# Patient Record
Sex: MALE | Race: BLACK OR AFRICAN AMERICAN | Employment: OTHER | URBAN - METROPOLITAN AREA
[De-identification: names, ages, dates, MRNs, and addresses within clinical notes are randomized per-mention and may not be internally consistent; named-entity substitution may affect disease eponyms.]

---

## 2017-02-01 RX ORDER — AMLODIPINE BESYLATE 10 MG/1
TABLET ORAL
Qty: 90 TABLET | Refills: 0 | Status: SHIPPED | OUTPATIENT
Start: 2017-02-01 | End: 2017-04-28 | Stop reason: SDUPTHER

## 2017-03-14 RX ORDER — PRAVASTATIN SODIUM 40 MG
TABLET ORAL
Qty: 90 TABLET | Refills: 0 | Status: SHIPPED | OUTPATIENT
Start: 2017-03-14 | End: 2017-06-08 | Stop reason: SDUPTHER

## 2017-03-21 RX ORDER — VALSARTAN AND HYDROCHLOROTHIAZIDE 160; 12.5 MG/1; MG/1
TABLET, FILM COATED ORAL
Qty: 30 TABLET | Refills: 5 | Status: SHIPPED | OUTPATIENT
Start: 2017-03-21 | End: 2017-09-18 | Stop reason: SDUPTHER

## 2017-05-04 RX ORDER — AMLODIPINE BESYLATE 10 MG/1
TABLET ORAL
Qty: 90 TABLET | Refills: 0 | Status: SHIPPED | OUTPATIENT
Start: 2017-05-04 | End: 2017-07-19 | Stop reason: SDUPTHER

## 2017-06-09 RX ORDER — PRAVASTATIN SODIUM 40 MG
TABLET ORAL
Qty: 90 TABLET | Refills: 0 | Status: SHIPPED | OUTPATIENT
Start: 2017-06-09 | End: 2017-09-08 | Stop reason: SDUPTHER

## 2017-06-21 ENCOUNTER — HOSPITAL ENCOUNTER (OUTPATIENT)
Age: 57
Setting detail: SPECIMEN
Discharge: HOME | End: 2017-06-21
Payer: COMMERCIAL

## 2017-06-21 ENCOUNTER — OFFICE VISIT (OUTPATIENT)
Dept: INTERNAL MEDICINE CLINIC | Age: 57
End: 2017-06-21
Payer: COMMERCIAL

## 2017-06-21 VITALS
WEIGHT: 262.3 LBS | RESPIRATION RATE: 16 BRPM | HEIGHT: 71 IN | OXYGEN SATURATION: 96 % | HEART RATE: 77 BPM | SYSTOLIC BLOOD PRESSURE: 126 MMHG | DIASTOLIC BLOOD PRESSURE: 66 MMHG | TEMPERATURE: 98.6 F | BODY MASS INDEX: 36.72 KG/M2

## 2017-06-21 DIAGNOSIS — Z11.59 NEED FOR HEPATITIS C SCREENING TEST: ICD-10-CM

## 2017-06-21 DIAGNOSIS — N40.0 BENIGN NON-NODULAR PROSTATIC HYPERPLASIA WITHOUT LOWER URINARY TRACT SYMPTOMS: ICD-10-CM

## 2017-06-21 DIAGNOSIS — N52.01 ERECTILE DYSFUNCTION DUE TO ARTERIAL INSUFFICIENCY: ICD-10-CM

## 2017-06-21 DIAGNOSIS — I10 ESSENTIAL HYPERTENSION: ICD-10-CM

## 2017-06-21 DIAGNOSIS — Z12.5 PROSTATE CANCER SCREENING: ICD-10-CM

## 2017-06-21 DIAGNOSIS — Z12.11 COLON CANCER SCREENING: ICD-10-CM

## 2017-06-21 DIAGNOSIS — I10 ESSENTIAL HYPERTENSION: Primary | ICD-10-CM

## 2017-06-21 DIAGNOSIS — Z13.1 DIABETES MELLITUS SCREENING: ICD-10-CM

## 2017-06-21 DIAGNOSIS — E78.00 PURE HYPERCHOLESTEROLEMIA: ICD-10-CM

## 2017-06-21 DIAGNOSIS — Z11.4 SCREENING FOR HIV (HUMAN IMMUNODEFICIENCY VIRUS): ICD-10-CM

## 2017-06-21 DIAGNOSIS — K21.9 GASTROESOPHAGEAL REFLUX DISEASE WITHOUT ESOPHAGITIS: ICD-10-CM

## 2017-06-21 LAB
ALBUMIN SERPL-MCNC: 4.5 G/DL (ref 3.5–5.2)
ALBUMIN/GLOBULIN RATIO: 1.3 (ref 1–2.5)
ALP BLD-CCNC: 76 U/L (ref 40–129)
ALT SERPL-CCNC: 13 U/L (ref 5–41)
ANION GAP SERPL CALCULATED.3IONS-SCNC: 13 MMOL/L (ref 9–17)
AST SERPL-CCNC: 19 U/L
BILIRUB SERPL-MCNC: 0.88 MG/DL (ref 0.3–1.2)
BUN BLDV-MCNC: 10 MG/DL (ref 6–20)
BUN/CREAT BLD: ABNORMAL (ref 9–20)
CALCIUM SERPL-MCNC: 9.6 MG/DL (ref 8.6–10.4)
CHLORIDE BLD-SCNC: 102 MMOL/L (ref 98–107)
CHOLESTEROL/HDL RATIO: 2.4
CHOLESTEROL: 135 MG/DL
CO2: 27 MMOL/L (ref 20–31)
CREAT SERPL-MCNC: 0.93 MG/DL (ref 0.7–1.2)
GFR AFRICAN AMERICAN: >60 ML/MIN
GFR NON-AFRICAN AMERICAN: >60 ML/MIN
GFR SERPL CREATININE-BSD FRML MDRD: ABNORMAL ML/MIN/{1.73_M2}
GFR SERPL CREATININE-BSD FRML MDRD: ABNORMAL ML/MIN/{1.73_M2}
GLUCOSE BLD-MCNC: 101 MG/DL (ref 70–99)
HDLC SERPL-MCNC: 57 MG/DL
HEPATITIS C ANTIBODY: NONREACTIVE
HIV AG/AB: NONREACTIVE
LDL CHOLESTEROL: 66 MG/DL (ref 0–130)
POTASSIUM SERPL-SCNC: 4.3 MMOL/L (ref 3.7–5.3)
PROSTATE SPECIFIC ANTIGEN: 1.23 UG/L
SODIUM BLD-SCNC: 142 MMOL/L (ref 135–144)
TOTAL PROTEIN: 8 G/DL (ref 6.4–8.3)
TRIGL SERPL-MCNC: 60 MG/DL
VLDLC SERPL CALC-MCNC: NORMAL MG/DL (ref 1–30)

## 2017-06-21 PROCEDURE — G8419 CALC BMI OUT NRM PARAM NOF/U: HCPCS | Performed by: FAMILY MEDICINE

## 2017-06-21 PROCEDURE — 99213 OFFICE O/P EST LOW 20 MIN: CPT | Performed by: FAMILY MEDICINE

## 2017-06-21 PROCEDURE — 3017F COLORECTAL CA SCREEN DOC REV: CPT | Performed by: FAMILY MEDICINE

## 2017-06-21 PROCEDURE — G8427 DOCREV CUR MEDS BY ELIG CLIN: HCPCS | Performed by: FAMILY MEDICINE

## 2017-06-21 PROCEDURE — 1036F TOBACCO NON-USER: CPT | Performed by: FAMILY MEDICINE

## 2017-06-21 ASSESSMENT — ENCOUNTER SYMPTOMS
SORE THROAT: 0
EYE REDNESS: 0
EYE DISCHARGE: 0
ABDOMINAL DISTENTION: 0
WHEEZING: 0
CONSTIPATION: 0
COLOR CHANGE: 0
EYE PAIN: 0
ABDOMINAL PAIN: 0
STRIDOR: 0
SHORTNESS OF BREATH: 0
TROUBLE SWALLOWING: 0
COUGH: 0
ANAL BLEEDING: 0
BACK PAIN: 0
FACIAL SWELLING: 0
CHEST TIGHTNESS: 0

## 2017-06-21 ASSESSMENT — PATIENT HEALTH QUESTIONNAIRE - PHQ9
1. LITTLE INTEREST OR PLEASURE IN DOING THINGS: 0
2. FEELING DOWN, DEPRESSED OR HOPELESS: 0
SUM OF ALL RESPONSES TO PHQ9 QUESTIONS 1 & 2: 0
SUM OF ALL RESPONSES TO PHQ QUESTIONS 1-9: 0

## 2017-06-22 LAB
ESTIMATED AVERAGE GLUCOSE: 114 MG/DL
HBA1C MFR BLD: 5.6 % (ref 4–6)

## 2017-08-08 RX ORDER — AMLODIPINE BESYLATE 10 MG/1
TABLET ORAL
Qty: 90 TABLET | Refills: 0 | Status: SHIPPED | OUTPATIENT
Start: 2017-08-08 | End: 2017-11-13 | Stop reason: SDUPTHER

## 2017-08-25 ENCOUNTER — HOSPITAL ENCOUNTER (OUTPATIENT)
Dept: MRI IMAGING | Facility: CLINIC | Age: 57
Discharge: HOME | End: 2017-08-25
Payer: COMMERCIAL

## 2017-08-25 DIAGNOSIS — D21.21 BENIGN NEOPLASM OF CONNECTIVE AND OTHER SOFT TISSUE OF RIGHT LOWER LIMB, INCLUDING HIP: ICD-10-CM

## 2017-08-25 LAB — POC CREATININE: 1.1 MG/DL (ref 0.6–1.4)

## 2017-08-25 PROCEDURE — 6360000004 HC RX CONTRAST MEDICATION: Performed by: ORTHOPAEDIC SURGERY

## 2017-08-25 PROCEDURE — 82565 ASSAY OF CREATININE: CPT

## 2017-08-25 PROCEDURE — 73720 MRI LWR EXTREMITY W/O&W/DYE: CPT

## 2017-08-25 PROCEDURE — A9579 GAD-BASE MR CONTRAST NOS,1ML: HCPCS | Performed by: ORTHOPAEDIC SURGERY

## 2017-08-25 RX ORDER — SODIUM CHLORIDE 0.9 % (FLUSH) 0.9 %
10 SYRINGE (ML) INJECTION PRN
Status: DISCONTINUED | OUTPATIENT
Start: 2017-08-25 | End: 2017-08-28 | Stop reason: HOSPADM

## 2017-08-25 RX ADMIN — GADOPENTETATE DIMEGLUMINE 20 ML: 469.01 INJECTION INTRAVENOUS at 14:24

## 2017-09-09 RX ORDER — PRAVASTATIN SODIUM 40 MG
TABLET ORAL
Qty: 90 TABLET | Refills: 1 | Status: SHIPPED | OUTPATIENT
Start: 2017-09-09 | End: 2018-03-13 | Stop reason: SDUPTHER

## 2017-09-20 RX ORDER — VALSARTAN AND HYDROCHLOROTHIAZIDE 160; 12.5 MG/1; MG/1
TABLET, FILM COATED ORAL
Qty: 30 TABLET | Refills: 3 | Status: SHIPPED | OUTPATIENT
Start: 2017-09-20 | End: 2017-12-15 | Stop reason: SDUPTHER

## 2017-11-13 ENCOUNTER — TELEPHONE (OUTPATIENT)
Dept: INTERNAL MEDICINE CLINIC | Age: 57
End: 2017-11-13

## 2017-11-13 DIAGNOSIS — I10 ESSENTIAL HYPERTENSION: Primary | ICD-10-CM

## 2017-11-13 RX ORDER — AMLODIPINE BESYLATE 10 MG/1
TABLET ORAL
Qty: 30 TABLET | Refills: 0 | Status: SHIPPED | OUTPATIENT
Start: 2017-11-13 | End: 2017-12-15 | Stop reason: SDUPTHER

## 2017-12-15 DIAGNOSIS — I10 ESSENTIAL HYPERTENSION: ICD-10-CM

## 2017-12-15 RX ORDER — VALSARTAN AND HYDROCHLOROTHIAZIDE 160; 12.5 MG/1; MG/1
1 TABLET, FILM COATED ORAL DAILY
Qty: 25 TABLET | Refills: 0 | Status: SHIPPED | OUTPATIENT
Start: 2017-12-15 | End: 2018-02-12 | Stop reason: SDUPTHER

## 2017-12-15 RX ORDER — AMLODIPINE BESYLATE 10 MG/1
TABLET ORAL
Qty: 30 TABLET | Refills: 0 | Status: SHIPPED | OUTPATIENT
Start: 2017-12-15 | End: 2018-01-10 | Stop reason: SDUPTHER

## 2017-12-15 NOTE — TELEPHONE ENCOUNTER
Pharmacy calling to ask if patient can get a short supply of BP medication as they have already loaned him a few    because patient is out   Next appt 12/20

## 2017-12-15 NOTE — TELEPHONE ENCOUNTER
Health Maintenance   Topic Date Due    DTaP/Tdap/Td vaccine (1 - Tdap) 12/06/1977    Flu vaccine (1) 09/01/2017    Diabetes screen  06/21/2020    Lipid screen  06/21/2022    Colon cancer screen colonoscopy  07/24/2025    Hepatitis C screen  Completed    HIV screen  Completed             (applicable per patient's age: Cancer Screenings, Depression Screening, Fall Risk Screening, Immunizations)    Hemoglobin A1C (%)   Date Value   06/21/2017 5.6   12/21/2016 5.3   12/18/2015 5.5     LDL Cholesterol (mg/dL)   Date Value   06/21/2017 66     AST (U/L)   Date Value   06/21/2017 19     ALT (U/L)   Date Value   06/21/2017 13     BUN (mg/dL)   Date Value   06/21/2017 10      (goal A1C is < 7)   (goal LDL is <100) need 30-50% reduction from baseline     BP Readings from Last 3 Encounters:   06/21/17 126/66   12/21/16 124/68   08/17/16 140/78    (goal /80)      All Future Testing planned in [facility]:      Next Visit Date:  Future Appointments  Date Time Provider Mark Kim   12/21/2017 2:00 PM Rolando Oliveira MD Adult pc [email or web address]            Patient Active Problem List:     BPH (benign prostatic hyperplasia)     Erectile dysfunction     GERD (gastroesophageal reflux disease)     Hypertension     Hyperlipidemia     Sinusitis     New onset of headaches after age 46     Elevated CK     Sleep apnea     Tendinopathy of rotator cuff     Prostate cancer screening     Screening, anemia, deficiency, iron     Mass of thigh

## 2017-12-21 ENCOUNTER — OFFICE VISIT (OUTPATIENT)
Dept: INTERNAL MEDICINE CLINIC | Age: 57
End: 2017-12-21
Payer: COMMERCIAL

## 2017-12-21 VITALS
RESPIRATION RATE: 18 BRPM | BODY MASS INDEX: 37.83 KG/M2 | DIASTOLIC BLOOD PRESSURE: 80 MMHG | HEIGHT: 71 IN | WEIGHT: 270.2 LBS | SYSTOLIC BLOOD PRESSURE: 130 MMHG

## 2017-12-21 DIAGNOSIS — K21.9 GASTROESOPHAGEAL REFLUX DISEASE WITHOUT ESOPHAGITIS: ICD-10-CM

## 2017-12-21 DIAGNOSIS — I10 ESSENTIAL HYPERTENSION: Primary | ICD-10-CM

## 2017-12-21 DIAGNOSIS — G47.33 OSA ON CPAP: ICD-10-CM

## 2017-12-21 DIAGNOSIS — Z99.89 OSA ON CPAP: ICD-10-CM

## 2017-12-21 DIAGNOSIS — E78.49 OTHER HYPERLIPIDEMIA: ICD-10-CM

## 2017-12-21 DIAGNOSIS — M65.841 STENOSING TENOSYNOVITIS OF FINGER OF RIGHT HAND: ICD-10-CM

## 2017-12-21 DIAGNOSIS — N52.8 OTHER MALE ERECTILE DYSFUNCTION: ICD-10-CM

## 2017-12-21 DIAGNOSIS — N40.0 BENIGN PROSTATIC HYPERPLASIA WITHOUT LOWER URINARY TRACT SYMPTOMS: ICD-10-CM

## 2017-12-21 PROCEDURE — G8484 FLU IMMUNIZE NO ADMIN: HCPCS | Performed by: FAMILY MEDICINE

## 2017-12-21 PROCEDURE — 99214 OFFICE O/P EST MOD 30 MIN: CPT | Performed by: FAMILY MEDICINE

## 2017-12-21 PROCEDURE — 90688 IIV4 VACCINE SPLT 0.5 ML IM: CPT | Performed by: FAMILY MEDICINE

## 2017-12-21 PROCEDURE — G8417 CALC BMI ABV UP PARAM F/U: HCPCS | Performed by: FAMILY MEDICINE

## 2017-12-21 PROCEDURE — 3017F COLORECTAL CA SCREEN DOC REV: CPT | Performed by: FAMILY MEDICINE

## 2017-12-21 PROCEDURE — G8427 DOCREV CUR MEDS BY ELIG CLIN: HCPCS | Performed by: FAMILY MEDICINE

## 2017-12-21 PROCEDURE — 1036F TOBACCO NON-USER: CPT | Performed by: FAMILY MEDICINE

## 2017-12-21 PROCEDURE — 90471 IMMUNIZATION ADMIN: CPT | Performed by: FAMILY MEDICINE

## 2017-12-21 ASSESSMENT — ENCOUNTER SYMPTOMS
RESPIRATORY NEGATIVE: 1
ALLERGIC/IMMUNOLOGIC NEGATIVE: 1
GASTROINTESTINAL NEGATIVE: 1
EYES NEGATIVE: 1

## 2017-12-21 NOTE — PROGRESS NOTES
Chronic Disease Visit Information    BP Readings from Last 3 Encounters:   12/21/17 130/80   06/21/17 126/66   12/21/16 124/68          Hemoglobin A1C (%)   Date Value   06/21/2017 5.6   12/21/2016 5.3   12/18/2015 5.5     LDL Cholesterol (mg/dL)   Date Value   06/21/2017 66     HDL (mg/dL)   Date Value   06/21/2017 57     BUN (mg/dL)   Date Value   06/21/2017 10     CREATININE (mg/dL)   Date Value   06/21/2017 0.93     POC Creatinine (mg/dL)   Date Value   08/25/2017 1.1     Glucose (mg/dL)   Date Value   06/21/2017 101 (H)            Have you changed or started any medications since your last visit including any over-the-counter medicines, vitamins, or herbal medicines? no   Are you having any side effects from any of your medications? -  no  Have you stopped taking any of your medications? Is so, why? -  no    Have you seen any other physician or provider since your last visit? No  Have you had any other diagnostic tests since your last visit? No  Have you been seen in the emergency room and/or had an admission to a hospital since we last saw you? No  Have you had your annual diabetic retinal (eye) exam? Yes - Records Requested  Have you had your routine dental cleaning in the past 6 months? no    Have you activated your [employer] account? If not, what are your barriers?  No:      Patient Care Team:  Lisa White MD as PCP - General (Family Medicine)         Medical History Review  Past Medical, Family, and Social History reviewed and does contribute to the patient presenting condition    Health Maintenance   Topic Date Due    DTaP/Tdap/Td vaccine (1 - Tdap) 12/06/1977    Flu vaccine (1) 09/01/2017    Diabetes screen  06/21/2020    Lipid screen  06/21/2022    Colon cancer screen colonoscopy  07/24/2025    Hepatitis C screen  Completed    HIV screen  Completed
mood and affect. His behavior is normal. Thought content normal.       Assessment:      1. Essential hypertension     2. Other hyperlipidemia     3. Stenosing tenosynovitis of finger of right hand     4. Other male erectile dysfunction     5. Gastroesophageal reflux disease without esophagitis     6. Benign prostatic hyperplasia without lower urinary tract symptoms     7. YUMIKO on CPAP             Plan:      66-year-old morbidly obese male returns for follow-up. He does not voice any distress. He is afebrile hemodynamically stable, clinical examination is benign. He is normotensive. Advised to continue calcium channel blocker and ARB. Is advised low-fat high-fiber diet, daily moderate exercise, lifestyle change and weight loss to keep BMI 27 below. Obstructive sleep apnea. He is compliant with CPAP. Stenosing tenosynovitis right long finger. No apparent sign of trigger finger. Observe. Is also given an option of seeing an orthopedic surgeon. He wished to observe  Hyperlipidemia on statin that he is tolerating well. Erectile dysfunction on Cialis  Osteoarthritis. Baseline stable. Continue ibuprofen when necessary. Impaired fasting glucose however, A1c is 5.6. Risk factor stratification is advised. GERD stable on proton pump inhibitor. BPH clinically asymptomatic. He denies tobacco, excessive alcohol or illicit drug use  Med list reviewed advised to continue  Call for any concern  This note is created with a voice recognition program and while intend to generate a document that accurately reflects the content of the visit, no guarantee can be provided that every mistake has been identified and corrected by editing.

## 2018-01-10 DIAGNOSIS — I10 ESSENTIAL HYPERTENSION: ICD-10-CM

## 2018-01-11 RX ORDER — AMLODIPINE BESYLATE 10 MG/1
TABLET ORAL
Qty: 30 TABLET | Refills: 2 | Status: SHIPPED | OUTPATIENT
Start: 2018-01-11 | End: 2018-04-13 | Stop reason: SDUPTHER

## 2018-01-19 ENCOUNTER — HOSPITAL ENCOUNTER (OUTPATIENT)
Dept: MRI IMAGING | Facility: CLINIC | Age: 58
Discharge: HOME | End: 2018-01-19
Payer: COMMERCIAL

## 2018-01-19 DIAGNOSIS — D21.21: ICD-10-CM

## 2018-01-19 LAB — POC CREATININE: 1.1 MG/DL (ref 0.6–1.4)

## 2018-01-19 PROCEDURE — A9579 GAD-BASE MR CONTRAST NOS,1ML: HCPCS | Performed by: ORTHOPAEDIC SURGERY

## 2018-01-19 PROCEDURE — 6360000004 HC RX CONTRAST MEDICATION: Performed by: ORTHOPAEDIC SURGERY

## 2018-01-19 PROCEDURE — 82565 ASSAY OF CREATININE: CPT

## 2018-01-19 PROCEDURE — 73720 MRI LWR EXTREMITY W/O&W/DYE: CPT

## 2018-01-19 RX ADMIN — GADOTERIDOL 20 ML: 279.3 INJECTION, SOLUTION INTRAVENOUS at 14:56

## 2018-02-12 RX ORDER — VALSARTAN AND HYDROCHLOROTHIAZIDE 160; 12.5 MG/1; MG/1
TABLET, FILM COATED ORAL
Qty: 25 TABLET | Refills: 2 | Status: SHIPPED | OUTPATIENT
Start: 2018-02-12 | End: 2018-04-17 | Stop reason: SDUPTHER

## 2018-03-14 RX ORDER — PRAVASTATIN SODIUM 40 MG
TABLET ORAL
Qty: 90 TABLET | Refills: 1 | Status: SHIPPED | OUTPATIENT
Start: 2018-03-14 | End: 2018-09-10 | Stop reason: SDUPTHER

## 2018-04-13 DIAGNOSIS — I10 ESSENTIAL HYPERTENSION: ICD-10-CM

## 2018-04-13 RX ORDER — AMLODIPINE BESYLATE 10 MG/1
TABLET ORAL
Qty: 30 TABLET | Refills: 2 | Status: SHIPPED | OUTPATIENT
Start: 2018-04-13 | End: 2018-07-12 | Stop reason: SDUPTHER

## 2018-04-17 ENCOUNTER — OFFICE VISIT (OUTPATIENT)
Dept: INTERNAL MEDICINE CLINIC | Age: 58
End: 2018-04-17
Payer: COMMERCIAL

## 2018-04-17 VITALS
RESPIRATION RATE: 17 BRPM | HEIGHT: 71 IN | HEART RATE: 78 BPM | SYSTOLIC BLOOD PRESSURE: 122 MMHG | WEIGHT: 264 LBS | BODY MASS INDEX: 36.96 KG/M2 | DIASTOLIC BLOOD PRESSURE: 70 MMHG | OXYGEN SATURATION: 98 %

## 2018-04-17 DIAGNOSIS — G47.33 OSA ON CPAP: ICD-10-CM

## 2018-04-17 DIAGNOSIS — Z99.89 OSA ON CPAP: ICD-10-CM

## 2018-04-17 DIAGNOSIS — I10 ESSENTIAL HYPERTENSION: ICD-10-CM

## 2018-04-17 DIAGNOSIS — K21.9 GASTROESOPHAGEAL REFLUX DISEASE WITHOUT ESOPHAGITIS: ICD-10-CM

## 2018-04-17 DIAGNOSIS — N52.8 OTHER MALE ERECTILE DYSFUNCTION: ICD-10-CM

## 2018-04-17 DIAGNOSIS — M15.9 PRIMARY OSTEOARTHRITIS INVOLVING MULTIPLE JOINTS: ICD-10-CM

## 2018-04-17 DIAGNOSIS — N40.0 BENIGN PROSTATIC HYPERPLASIA WITHOUT LOWER URINARY TRACT SYMPTOMS: ICD-10-CM

## 2018-04-17 DIAGNOSIS — E78.49 OTHER HYPERLIPIDEMIA: Primary | ICD-10-CM

## 2018-04-17 PROCEDURE — G8417 CALC BMI ABV UP PARAM F/U: HCPCS | Performed by: FAMILY MEDICINE

## 2018-04-17 PROCEDURE — G8427 DOCREV CUR MEDS BY ELIG CLIN: HCPCS | Performed by: FAMILY MEDICINE

## 2018-04-17 PROCEDURE — 1036F TOBACCO NON-USER: CPT | Performed by: FAMILY MEDICINE

## 2018-04-17 PROCEDURE — 99214 OFFICE O/P EST MOD 30 MIN: CPT | Performed by: FAMILY MEDICINE

## 2018-04-17 PROCEDURE — 3017F COLORECTAL CA SCREEN DOC REV: CPT | Performed by: FAMILY MEDICINE

## 2018-04-17 RX ORDER — VALSARTAN AND HYDROCHLOROTHIAZIDE 160; 12.5 MG/1; MG/1
1 TABLET, FILM COATED ORAL DAILY
Qty: 30 TABLET | Refills: 2 | Status: SHIPPED | OUTPATIENT
Start: 2018-04-17 | End: 2018-08-14 | Stop reason: SDUPTHER

## 2018-04-17 RX ORDER — HYDROCHLOROTHIAZIDE 25 MG/1
25 TABLET ORAL DAILY
Qty: 90 TABLET | Refills: 1 | Status: SHIPPED | OUTPATIENT
Start: 2018-04-17 | End: 2018-04-17 | Stop reason: CLARIF

## 2018-04-17 ASSESSMENT — ENCOUNTER SYMPTOMS
GASTROINTESTINAL NEGATIVE: 1
RESPIRATORY NEGATIVE: 1
EYES NEGATIVE: 1
ALLERGIC/IMMUNOLOGIC NEGATIVE: 1

## 2018-04-23 ENCOUNTER — OFFICE VISIT (OUTPATIENT)
Dept: PULMONOLOGY | Age: 58
End: 2018-04-23
Payer: COMMERCIAL

## 2018-04-23 VITALS
WEIGHT: 267.4 LBS | DIASTOLIC BLOOD PRESSURE: 77 MMHG | BODY MASS INDEX: 37.44 KG/M2 | HEIGHT: 71 IN | TEMPERATURE: 97.8 F | RESPIRATION RATE: 16 BRPM | SYSTOLIC BLOOD PRESSURE: 138 MMHG | HEART RATE: 80 BPM | OXYGEN SATURATION: 100 %

## 2018-04-23 DIAGNOSIS — K21.9 GASTROESOPHAGEAL REFLUX DISEASE WITHOUT ESOPHAGITIS: ICD-10-CM

## 2018-04-23 DIAGNOSIS — N52.2 DRUG-INDUCED ERECTILE DYSFUNCTION: ICD-10-CM

## 2018-04-23 DIAGNOSIS — I10 ESSENTIAL HYPERTENSION: ICD-10-CM

## 2018-04-23 DIAGNOSIS — E66.9 OBESITY (BMI 35.0-39.9 WITHOUT COMORBIDITY): ICD-10-CM

## 2018-04-23 DIAGNOSIS — G47.33 OSA (OBSTRUCTIVE SLEEP APNEA): Primary | ICD-10-CM

## 2018-04-23 PROCEDURE — 3017F COLORECTAL CA SCREEN DOC REV: CPT | Performed by: INTERNAL MEDICINE

## 2018-04-23 PROCEDURE — G8417 CALC BMI ABV UP PARAM F/U: HCPCS | Performed by: INTERNAL MEDICINE

## 2018-04-23 PROCEDURE — G8427 DOCREV CUR MEDS BY ELIG CLIN: HCPCS | Performed by: INTERNAL MEDICINE

## 2018-04-23 PROCEDURE — 1036F TOBACCO NON-USER: CPT | Performed by: INTERNAL MEDICINE

## 2018-04-23 PROCEDURE — 99205 OFFICE O/P NEW HI 60 MIN: CPT | Performed by: INTERNAL MEDICINE

## 2018-04-23 ASSESSMENT — SLEEP AND FATIGUE QUESTIONNAIRES
HOW LIKELY ARE YOU TO NOD OFF OR FALL ASLEEP WHILE SITTING QUIETLY AFTER LUNCH WITHOUT ALCOHOL: 0
HOW LIKELY ARE YOU TO NOD OFF OR FALL ASLEEP WHILE WATCHING TV: 0
ESS TOTAL SCORE: 0
HOW LIKELY ARE YOU TO NOD OFF OR FALL ASLEEP WHILE SITTING AND READING: 0
HOW LIKELY ARE YOU TO NOD OFF OR FALL ASLEEP WHILE SITTING INACTIVE IN A PUBLIC PLACE: 0
HOW LIKELY ARE YOU TO NOD OFF OR FALL ASLEEP WHILE SITTING AND TALKING TO SOMEONE: 0
HOW LIKELY ARE YOU TO NOD OFF OR FALL ASLEEP WHEN YOU ARE A PASSENGER IN A CAR FOR AN HOUR WITHOUT A BREAK: 0
HOW LIKELY ARE YOU TO NOD OFF OR FALL ASLEEP WHILE LYING DOWN TO REST IN THE AFTERNOON WHEN CIRCUMSTANCES PERMIT: 0
HOW LIKELY ARE YOU TO NOD OFF OR FALL ASLEEP IN A CAR, WHILE STOPPED FOR A FEW MINUTES IN TRAFFIC: 0

## 2018-05-02 ENCOUNTER — HOSPITAL ENCOUNTER (OUTPATIENT)
Dept: SLEEP CENTER | Age: 58
Discharge: HOME | End: 2018-05-04
Payer: COMMERCIAL

## 2018-05-02 DIAGNOSIS — I10 ESSENTIAL HYPERTENSION: ICD-10-CM

## 2018-05-02 DIAGNOSIS — G47.33 OSA (OBSTRUCTIVE SLEEP APNEA): ICD-10-CM

## 2018-05-02 PROCEDURE — 95811 POLYSOM 6/>YRS CPAP 4/> PARM: CPT

## 2018-05-02 ASSESSMENT — SLEEP AND FATIGUE QUESTIONNAIRES
HOW LIKELY ARE YOU TO NOD OFF OR FALL ASLEEP WHILE LYING DOWN TO REST IN THE AFTERNOON WHEN CIRCUMSTANCES PERMIT: 0
HOW LIKELY ARE YOU TO NOD OFF OR FALL ASLEEP WHILE SITTING AND READING: 0
HOW LIKELY ARE YOU TO NOD OFF OR FALL ASLEEP IN A CAR, WHILE STOPPED FOR A FEW MINUTES IN TRAFFIC: 0
HOW LIKELY ARE YOU TO NOD OFF OR FALL ASLEEP WHILE SITTING QUIETLY AFTER LUNCH WITHOUT ALCOHOL: 0
ESS TOTAL SCORE: 0
HOW LIKELY ARE YOU TO NOD OFF OR FALL ASLEEP WHILE SITTING AND TALKING TO SOMEONE: 0
HOW LIKELY ARE YOU TO NOD OFF OR FALL ASLEEP WHEN YOU ARE A PASSENGER IN A CAR FOR AN HOUR WITHOUT A BREAK: 0
HOW LIKELY ARE YOU TO NOD OFF OR FALL ASLEEP WHILE SITTING INACTIVE IN A PUBLIC PLACE: 0
HOW LIKELY ARE YOU TO NOD OFF OR FALL ASLEEP WHILE WATCHING TV: 0

## 2018-05-03 VITALS — BODY MASS INDEX: 37.43 KG/M2 | HEART RATE: 65 BPM | HEIGHT: 71 IN | WEIGHT: 267.38 LBS | RESPIRATION RATE: 16 BRPM

## 2018-06-04 LAB — STATUS: NORMAL

## 2018-06-15 ENCOUNTER — HOSPITAL ENCOUNTER (OUTPATIENT)
Age: 58
Setting detail: SPECIMEN
Discharge: HOME | End: 2018-06-15
Payer: COMMERCIAL

## 2018-06-15 DIAGNOSIS — E78.49 OTHER HYPERLIPIDEMIA: ICD-10-CM

## 2018-06-15 DIAGNOSIS — N40.0 BENIGN PROSTATIC HYPERPLASIA WITHOUT LOWER URINARY TRACT SYMPTOMS: ICD-10-CM

## 2018-06-15 DIAGNOSIS — I10 ESSENTIAL HYPERTENSION: ICD-10-CM

## 2018-06-15 DIAGNOSIS — K21.9 GASTROESOPHAGEAL REFLUX DISEASE WITHOUT ESOPHAGITIS: ICD-10-CM

## 2018-06-15 LAB
ABSOLUTE EOS #: 0.06 K/UL (ref 0–0.44)
ABSOLUTE IMMATURE GRANULOCYTE: <0.03 K/UL (ref 0–0.3)
ABSOLUTE LYMPH #: 1.48 K/UL (ref 1.1–3.7)
ABSOLUTE MONO #: 0.37 K/UL (ref 0.1–1.2)
ALBUMIN SERPL-MCNC: 4.4 G/DL (ref 3.5–5.2)
ALBUMIN/GLOBULIN RATIO: 1.5 (ref 1–2.5)
ALP BLD-CCNC: 98 U/L (ref 40–129)
ALT SERPL-CCNC: 14 U/L (ref 5–41)
ANION GAP SERPL CALCULATED.3IONS-SCNC: 12 MMOL/L (ref 9–17)
AST SERPL-CCNC: 17 U/L
BASOPHILS # BLD: 0 % (ref 0–2)
BASOPHILS ABSOLUTE: <0.03 K/UL (ref 0–0.2)
BILIRUB SERPL-MCNC: 0.8 MG/DL (ref 0.3–1.2)
BILIRUBIN URINE: NEGATIVE
BUN BLDV-MCNC: 10 MG/DL (ref 6–20)
BUN/CREAT BLD: ABNORMAL (ref 9–20)
CALCIUM SERPL-MCNC: 8.7 MG/DL (ref 8.6–10.4)
CHLORIDE BLD-SCNC: 106 MMOL/L (ref 98–107)
CO2: 26 MMOL/L (ref 20–31)
COLOR: YELLOW
COMMENT UA: NORMAL
CREAT SERPL-MCNC: 0.89 MG/DL (ref 0.7–1.2)
DIFFERENTIAL TYPE: ABNORMAL
EOSINOPHILS RELATIVE PERCENT: 1 % (ref 1–4)
ESTIMATED AVERAGE GLUCOSE: 105 MG/DL
GFR AFRICAN AMERICAN: >60 ML/MIN
GFR NON-AFRICAN AMERICAN: >60 ML/MIN
GFR SERPL CREATININE-BSD FRML MDRD: ABNORMAL ML/MIN/{1.73_M2}
GFR SERPL CREATININE-BSD FRML MDRD: ABNORMAL ML/MIN/{1.73_M2}
GLUCOSE BLD-MCNC: 96 MG/DL (ref 70–99)
GLUCOSE URINE: NEGATIVE
HBA1C MFR BLD: 5.3 % (ref 4–6)
HCT VFR BLD CALC: 41.5 % (ref 40.7–50.3)
HEMOGLOBIN: 13.5 G/DL (ref 13–17)
IMMATURE GRANULOCYTES: 0 %
KETONES, URINE: NEGATIVE
LEUKOCYTE ESTERASE, URINE: NEGATIVE
LYMPHOCYTES # BLD: 31 % (ref 24–43)
MCH RBC QN AUTO: 27.8 PG (ref 25.2–33.5)
MCHC RBC AUTO-ENTMCNC: 32.5 G/DL (ref 28.4–34.8)
MCV RBC AUTO: 85.6 FL (ref 82.6–102.9)
MONOCYTES # BLD: 8 % (ref 3–12)
NITRITE, URINE: NEGATIVE
NRBC AUTOMATED: 0 PER 100 WBC
PDW BLD-RTO: 14.5 % (ref 11.8–14.4)
PH UA: 7.5 (ref 5–8)
PLATELET # BLD: 234 K/UL (ref 138–453)
PLATELET ESTIMATE: ABNORMAL
PMV BLD AUTO: 10.3 FL (ref 8.1–13.5)
POTASSIUM SERPL-SCNC: 3.6 MMOL/L (ref 3.7–5.3)
PROSTATE SPECIFIC ANTIGEN: 1.61 UG/L
PROTEIN UA: NEGATIVE
RBC # BLD: 4.85 M/UL (ref 4.21–5.77)
RBC # BLD: ABNORMAL 10*6/UL
SEG NEUTROPHILS: 60 % (ref 36–65)
SEGMENTED NEUTROPHILS ABSOLUTE COUNT: 2.79 K/UL (ref 1.5–8.1)
SODIUM BLD-SCNC: 144 MMOL/L (ref 135–144)
SPECIFIC GRAVITY UA: 1.01 (ref 1–1.03)
TOTAL PROTEIN: 7.4 G/DL (ref 6.4–8.3)
TSH SERPL DL<=0.05 MIU/L-ACNC: 1.88 MIU/L (ref 0.3–5)
TURBIDITY: CLEAR
URINE HGB: NEGATIVE
UROBILINOGEN, URINE: NORMAL
WBC # BLD: 4.7 K/UL (ref 3.5–11.3)
WBC # BLD: ABNORMAL 10*3/UL

## 2018-07-20 ENCOUNTER — HOSPITAL ENCOUNTER (OUTPATIENT)
Dept: PREADMISSION TESTING | Age: 58
Discharge: HOME | End: 2018-07-24
Payer: COMMERCIAL

## 2018-07-20 VITALS
DIASTOLIC BLOOD PRESSURE: 75 MMHG | WEIGHT: 268.52 LBS | BODY MASS INDEX: 36.37 KG/M2 | HEIGHT: 72 IN | OXYGEN SATURATION: 99 % | HEART RATE: 84 BPM | SYSTOLIC BLOOD PRESSURE: 151 MMHG | RESPIRATION RATE: 16 BRPM

## 2018-07-20 LAB
ABSOLUTE EOS #: 0 K/UL (ref 0–0.4)
ABSOLUTE IMMATURE GRANULOCYTE: ABNORMAL K/UL (ref 0–0.3)
ABSOLUTE LYMPH #: 1.6 K/UL (ref 1–4.8)
ABSOLUTE MONO #: 0.4 K/UL (ref 0.2–0.8)
ANION GAP SERPL CALCULATED.3IONS-SCNC: 13 MMOL/L (ref 9–17)
BASOPHILS # BLD: 1 % (ref 0–2)
BASOPHILS ABSOLUTE: 0 K/UL (ref 0–0.2)
BUN BLDV-MCNC: 11 MG/DL (ref 6–20)
CHLORIDE BLD-SCNC: 103 MMOL/L (ref 98–107)
CO2: 26 MMOL/L (ref 20–31)
CREAT SERPL-MCNC: 1.22 MG/DL (ref 0.7–1.2)
DIFFERENTIAL TYPE: ABNORMAL
EKG ATRIAL RATE: 71 BPM
EKG P AXIS: 46 DEGREES
EKG P-R INTERVAL: 164 MS
EKG Q-T INTERVAL: 408 MS
EKG QRS DURATION: 86 MS
EKG QTC CALCULATION (BAZETT): 443 MS
EKG R AXIS: 17 DEGREES
EKG T AXIS: 34 DEGREES
EKG VENTRICULAR RATE: 71 BPM
EOSINOPHILS RELATIVE PERCENT: 1 % (ref 1–4)
GFR AFRICAN AMERICAN: >60 ML/MIN
GFR NON-AFRICAN AMERICAN: >60 ML/MIN
GFR SERPL CREATININE-BSD FRML MDRD: ABNORMAL ML/MIN/{1.73_M2}
GFR SERPL CREATININE-BSD FRML MDRD: ABNORMAL ML/MIN/{1.73_M2}
HCT VFR BLD CALC: 46.2 % (ref 41–53)
HEMOGLOBIN: 15 G/DL (ref 13.5–17.5)
IMMATURE GRANULOCYTES: ABNORMAL %
LYMPHOCYTES # BLD: 33 % (ref 24–44)
MCH RBC QN AUTO: 28.1 PG (ref 26–34)
MCHC RBC AUTO-ENTMCNC: 32.4 G/DL (ref 31–37)
MCV RBC AUTO: 86.7 FL (ref 80–100)
MONOCYTES # BLD: 7 % (ref 1–7)
NRBC AUTOMATED: ABNORMAL PER 100 WBC
PDW BLD-RTO: 14.9 % (ref 11.5–14.5)
PLATELET # BLD: 290 K/UL (ref 130–400)
PLATELET ESTIMATE: ABNORMAL
PMV BLD AUTO: 7.4 FL (ref 6–12)
POTASSIUM SERPL-SCNC: 3.8 MMOL/L (ref 3.7–5.3)
RBC # BLD: 5.33 M/UL (ref 4.5–5.9)
RBC # BLD: ABNORMAL 10*6/UL
SEG NEUTROPHILS: 58 % (ref 36–66)
SEGMENTED NEUTROPHILS ABSOLUTE COUNT: 3 K/UL (ref 1.8–7.7)
SODIUM BLD-SCNC: 142 MMOL/L (ref 135–144)
WBC # BLD: 5 K/UL (ref 3.5–11)
WBC # BLD: ABNORMAL 10*3/UL

## 2018-07-20 PROCEDURE — 84520 ASSAY OF UREA NITROGEN: CPT

## 2018-07-20 PROCEDURE — 85025 COMPLETE CBC W/AUTO DIFF WBC: CPT

## 2018-07-20 PROCEDURE — 36415 COLL VENOUS BLD VENIPUNCTURE: CPT

## 2018-07-20 PROCEDURE — 93005 ELECTROCARDIOGRAM TRACING: CPT

## 2018-07-20 PROCEDURE — 80051 ELECTROLYTE PANEL: CPT

## 2018-07-20 PROCEDURE — 82565 ASSAY OF CREATININE: CPT

## 2018-07-20 NOTE — H&P
MD        Allergies:     Patient has no known allergies. Social History:     Tobacco:    reports that he has never smoked. He has never used smokeless tobacco.  Alcohol:      reports that he drinks alcohol. Drug Use:  reports that he does not use drugs. Family History:     Family History   Problem Relation Age of Onset    Heart Disease Mother        Review of Systems:     Positive and Negative as described in HPI. CONSTITUTIONAL:  negative for fevers, chills, sweats, fatigue, weight loss  HEENT:  WEARS GLASSES FOR  vision, hearing changes, runny nose, throat pain  RESPIRATORY:  negative for shortness of breath, cough, congestion, wheezing. CARDIOVASCULAR:  negative for chest pain, palpitations. GASTROINTESTINAL:  negative for nausea, vomiting,HAS GERD RELATED TO A HIATAL HERNIA, TAKES OCCASIONAL GAVISCON NO  diarrhea, constipation, change in bowel habits, abdominal pain   GENITOURINARY:  negative for difficulty of urination, burning with urination, frequency   INTEGUMENT:  negative for rash, skin lesions, easy bruising   HEMATOLOGIC/LYMPHATIC:  negative for swelling/edema   ALLERGIC/IMMUNOLOGIC:  negative for urticaria , itching  ENDOCRINE:  negative increase in drinking, increase in urination, hot or cold intolerance  MUSCULOSKELETAL:  negative joint pains, muscle aches, swelling of joints  NEUROLOGICAL:  negative for headaches, dizziness, lightheadedness, numbness, pain, tingling extremities  BEHAVIOR/PSYCH:  negative for depression, anxiety    Physical Exam:   BP (!) 151/75   Pulse 84   Resp 16   Ht 6' (1.829 m)   Wt 268 lb 8.3 oz (121.8 kg)   SpO2 99%   BMI 36.42 kg/m²   No LMP for male patient. No obstetric history on file. No results for input(s): POCGLU in the last 72 hours. General Appearance:  alert, well appearing, and in no acute distress  Mental status: oriented to person, place, and time with normal affect  Head:  normocephalic, atraumatic.   Eye: no icterus, redness, pupils equal and reactive, extraocular eye movements intact, conjunctiva clear  Ear: normal external ear, no discharge, hearing intact  Nose:  no drainage noted  Mouth: mucous membranes moist  Neck: supple, no carotid bruits, thyroid not palpable  Lungs: Bilateral equal air entry, clear to ausculation, no wheezing, rales or rhonchi, normal effort  Cardiovascular: normal rate, regular rhythm, no murmur, gallop, rub. Abdomen: Soft, nontender, nondistended, normal bowel sounds, no hepatomegaly or splenomegaly  Neurologic: There are no new focal motor or sensory deficits, normal muscle tone and bulk, no abnormal sensation, normal speech, cranial nerves II through XII grossly intact  Skin: No gross lesions, rashes, bruising or bleeding on exposed skin area  Extremities:  peripheral pulses palpable, no pedal edema or calf pain with palpation  Psych: normal affect     Investigations:      Laboratory Testing:  No results found for this or any previous visit (from the past 24 hour(s)). No results for input(s): HGB, HCT, WBC, MCV, PLATELET, NA, K, CL, CO2, BUN, CREATININE, GLUCOSE, INR, PROTIME, APTT, AST, ALT, LABALBU, HCG in the last 720 hours. Imaging/Diagnostics:    EKG DONE 7/20/18    Diagnosis:      1. PENILE LESIONS     Plans:     1.  EXCISION AND BIOPSY OF PENILE LESIONS      ROBER Amador CNP  7/20/2018  2:06 PM

## 2018-07-30 ENCOUNTER — ANESTHESIA EVENT (OUTPATIENT)
Dept: OPERATING ROOM | Age: 58
End: 2018-07-30
Payer: COMMERCIAL

## 2018-07-31 ENCOUNTER — ANESTHESIA (OUTPATIENT)
Dept: OPERATING ROOM | Age: 58
End: 2018-07-31
Payer: COMMERCIAL

## 2018-07-31 ENCOUNTER — HOSPITAL ENCOUNTER (OUTPATIENT)
Age: 58
Setting detail: OUTPATIENT SURGERY
Discharge: HOME | End: 2018-07-31
Attending: UROLOGY | Admitting: UROLOGY
Payer: COMMERCIAL

## 2018-07-31 VITALS
SYSTOLIC BLOOD PRESSURE: 123 MMHG | BODY MASS INDEX: 36.37 KG/M2 | RESPIRATION RATE: 14 BRPM | HEIGHT: 72 IN | TEMPERATURE: 97 F | HEART RATE: 62 BPM | OXYGEN SATURATION: 98 % | WEIGHT: 268.52 LBS | DIASTOLIC BLOOD PRESSURE: 56 MMHG

## 2018-07-31 VITALS
SYSTOLIC BLOOD PRESSURE: 106 MMHG | TEMPERATURE: 95.2 F | OXYGEN SATURATION: 100 % | DIASTOLIC BLOOD PRESSURE: 57 MMHG | RESPIRATION RATE: 14 BRPM

## 2018-07-31 DIAGNOSIS — L98.9 SKIN LESION: Primary | ICD-10-CM

## 2018-07-31 PROCEDURE — 6360000002 HC RX W HCPCS: Performed by: UROLOGY

## 2018-07-31 PROCEDURE — 3700000001 HC ADD 15 MINUTES (ANESTHESIA): Performed by: UROLOGY

## 2018-07-31 PROCEDURE — 7100000010 HC PHASE II RECOVERY - FIRST 15 MIN: Performed by: UROLOGY

## 2018-07-31 PROCEDURE — 7100000000 HC PACU RECOVERY - FIRST 15 MIN: Performed by: UROLOGY

## 2018-07-31 PROCEDURE — 2580000003 HC RX 258: Performed by: ANESTHESIOLOGY

## 2018-07-31 PROCEDURE — 6360000002 HC RX W HCPCS: Performed by: NURSE ANESTHETIST, CERTIFIED REGISTERED

## 2018-07-31 PROCEDURE — 7100000011 HC PHASE II RECOVERY - ADDTL 15 MIN: Performed by: UROLOGY

## 2018-07-31 PROCEDURE — 7100000001 HC PACU RECOVERY - ADDTL 15 MIN: Performed by: UROLOGY

## 2018-07-31 PROCEDURE — 2500000003 HC RX 250 WO HCPCS: Performed by: UROLOGY

## 2018-07-31 PROCEDURE — 3700000000 HC ANESTHESIA ATTENDED CARE: Performed by: UROLOGY

## 2018-07-31 PROCEDURE — 3600000002 HC SURGERY LEVEL 2 BASE: Performed by: UROLOGY

## 2018-07-31 PROCEDURE — 3600000012 HC SURGERY LEVEL 2 ADDTL 15MIN: Performed by: UROLOGY

## 2018-07-31 PROCEDURE — 88305 TISSUE EXAM BY PATHOLOGIST: CPT

## 2018-07-31 PROCEDURE — 2709999900 HC NON-CHARGEABLE SUPPLY: Performed by: UROLOGY

## 2018-07-31 PROCEDURE — 6370000000 HC RX 637 (ALT 250 FOR IP): Performed by: UROLOGY

## 2018-07-31 PROCEDURE — 2500000003 HC RX 250 WO HCPCS: Performed by: NURSE ANESTHETIST, CERTIFIED REGISTERED

## 2018-07-31 RX ORDER — SODIUM CHLORIDE, SODIUM LACTATE, POTASSIUM CHLORIDE, CALCIUM CHLORIDE 600; 310; 30; 20 MG/100ML; MG/100ML; MG/100ML; MG/100ML
INJECTION, SOLUTION INTRAVENOUS CONTINUOUS
Status: DISCONTINUED | OUTPATIENT
Start: 2018-08-01 | End: 2018-07-31

## 2018-07-31 RX ORDER — LIDOCAINE HYDROCHLORIDE 20 MG/ML
INJECTION, SOLUTION INFILTRATION; PERINEURAL PRN
Status: DISCONTINUED | OUTPATIENT
Start: 2018-07-31 | End: 2018-07-31 | Stop reason: SDUPTHER

## 2018-07-31 RX ORDER — HYDROCODONE BITARTRATE AND ACETAMINOPHEN 5; 325 MG/1; MG/1
1 TABLET ORAL EVERY 6 HOURS PRN
Qty: 12 TABLET | Refills: 0 | Status: SHIPPED | OUTPATIENT
Start: 2018-07-31 | End: 2018-08-03

## 2018-07-31 RX ORDER — SODIUM CHLORIDE 0.9 % (FLUSH) 0.9 %
10 SYRINGE (ML) INJECTION PRN
Status: DISCONTINUED | OUTPATIENT
Start: 2018-07-31 | End: 2018-07-31 | Stop reason: HOSPADM

## 2018-07-31 RX ORDER — SODIUM CHLORIDE 9 MG/ML
INJECTION, SOLUTION INTRAVENOUS CONTINUOUS
Status: DISCONTINUED | OUTPATIENT
Start: 2018-08-01 | End: 2018-07-31

## 2018-07-31 RX ORDER — PROPOFOL 10 MG/ML
INJECTION, EMULSION INTRAVENOUS PRN
Status: DISCONTINUED | OUTPATIENT
Start: 2018-07-31 | End: 2018-07-31 | Stop reason: SDUPTHER

## 2018-07-31 RX ORDER — KETOROLAC TROMETHAMINE 30 MG/ML
INJECTION, SOLUTION INTRAMUSCULAR; INTRAVENOUS PRN
Status: DISCONTINUED | OUTPATIENT
Start: 2018-07-31 | End: 2018-07-31 | Stop reason: SDUPTHER

## 2018-07-31 RX ORDER — CEPHALEXIN 500 MG/1
500 CAPSULE ORAL 3 TIMES DAILY
Qty: 12 CAPSULE | Refills: 0 | Status: SHIPPED | OUTPATIENT
Start: 2018-07-31 | End: 2018-08-04

## 2018-07-31 RX ORDER — LIDOCAINE HYDROCHLORIDE 10 MG/ML
1 INJECTION, SOLUTION EPIDURAL; INFILTRATION; INTRACAUDAL; PERINEURAL
Status: DISCONTINUED | OUTPATIENT
Start: 2018-07-31 | End: 2018-07-31

## 2018-07-31 RX ORDER — SODIUM CHLORIDE, SODIUM LACTATE, POTASSIUM CHLORIDE, CALCIUM CHLORIDE 600; 310; 30; 20 MG/100ML; MG/100ML; MG/100ML; MG/100ML
INJECTION, SOLUTION INTRAVENOUS CONTINUOUS
Status: DISCONTINUED | OUTPATIENT
Start: 2018-07-31 | End: 2018-07-31 | Stop reason: HOSPADM

## 2018-07-31 RX ORDER — ONDANSETRON 2 MG/ML
INJECTION INTRAMUSCULAR; INTRAVENOUS PRN
Status: DISCONTINUED | OUTPATIENT
Start: 2018-07-31 | End: 2018-07-31 | Stop reason: SDUPTHER

## 2018-07-31 RX ORDER — SODIUM CHLORIDE 0.9 % (FLUSH) 0.9 %
10 SYRINGE (ML) INJECTION EVERY 12 HOURS SCHEDULED
Status: DISCONTINUED | OUTPATIENT
Start: 2018-07-31 | End: 2018-07-31 | Stop reason: HOSPADM

## 2018-07-31 RX ORDER — DEXAMETHASONE SODIUM PHOSPHATE 10 MG/ML
INJECTION INTRAMUSCULAR; INTRAVENOUS PRN
Status: DISCONTINUED | OUTPATIENT
Start: 2018-07-31 | End: 2018-07-31 | Stop reason: SDUPTHER

## 2018-07-31 RX ORDER — FENTANYL CITRATE 50 UG/ML
INJECTION, SOLUTION INTRAMUSCULAR; INTRAVENOUS PRN
Status: DISCONTINUED | OUTPATIENT
Start: 2018-07-31 | End: 2018-07-31 | Stop reason: SDUPTHER

## 2018-07-31 RX ORDER — BUPIVACAINE HYDROCHLORIDE 5 MG/ML
INJECTION, SOLUTION EPIDURAL; INTRACAUDAL PRN
Status: DISCONTINUED | OUTPATIENT
Start: 2018-07-31 | End: 2018-07-31 | Stop reason: HOSPADM

## 2018-07-31 RX ADMIN — CEFAZOLIN SODIUM 2 G: 2 SOLUTION INTRAVENOUS at 10:58

## 2018-07-31 RX ADMIN — FENTANYL CITRATE 50 MCG: 50 INJECTION, SOLUTION INTRAMUSCULAR; INTRAVENOUS at 10:58

## 2018-07-31 RX ADMIN — SODIUM CHLORIDE, POTASSIUM CHLORIDE, SODIUM LACTATE AND CALCIUM CHLORIDE: 600; 310; 30; 20 INJECTION, SOLUTION INTRAVENOUS at 09:01

## 2018-07-31 RX ADMIN — LIDOCAINE HYDROCHLORIDE 100 MG: 20 INJECTION, SOLUTION INFILTRATION; PERINEURAL at 11:04

## 2018-07-31 RX ADMIN — ONDANSETRON 4 MG: 2 INJECTION, SOLUTION INTRAMUSCULAR; INTRAVENOUS at 11:18

## 2018-07-31 RX ADMIN — PROPOFOL 200 MG: 10 INJECTION, EMULSION INTRAVENOUS at 11:04

## 2018-07-31 RX ADMIN — SODIUM CHLORIDE, POTASSIUM CHLORIDE, SODIUM LACTATE AND CALCIUM CHLORIDE: 600; 310; 30; 20 INJECTION, SOLUTION INTRAVENOUS at 10:58

## 2018-07-31 RX ADMIN — FENTANYL CITRATE 50 MCG: 50 INJECTION, SOLUTION INTRAMUSCULAR; INTRAVENOUS at 11:13

## 2018-07-31 RX ADMIN — KETOROLAC TROMETHAMINE 30 MG: 30 INJECTION, SOLUTION INTRAMUSCULAR; INTRAVENOUS at 11:18

## 2018-07-31 RX ADMIN — DEXAMETHASONE SODIUM PHOSPHATE 10 MG: 10 INJECTION INTRAMUSCULAR; INTRAVENOUS at 11:06

## 2018-07-31 ASSESSMENT — PULMONARY FUNCTION TESTS
PIF_VALUE: 3
PIF_VALUE: 13
PIF_VALUE: 13
PIF_VALUE: 2
PIF_VALUE: 1
PIF_VALUE: 13
PIF_VALUE: 1
PIF_VALUE: 13
PIF_VALUE: 13
PIF_VALUE: 4
PIF_VALUE: 13
PIF_VALUE: 3
PIF_VALUE: 2
PIF_VALUE: 2
PIF_VALUE: 13
PIF_VALUE: 14
PIF_VALUE: 13
PIF_VALUE: 1
PIF_VALUE: 14
PIF_VALUE: 14
PIF_VALUE: 13
PIF_VALUE: 2
PIF_VALUE: 1
PIF_VALUE: 1
PIF_VALUE: 14
PIF_VALUE: 13
PIF_VALUE: 16
PIF_VALUE: 14
PIF_VALUE: 14
PIF_VALUE: 13
PIF_VALUE: 14
PIF_VALUE: 14
PIF_VALUE: 13
PIF_VALUE: 13
PIF_VALUE: 14

## 2018-07-31 ASSESSMENT — PAIN SCALES - GENERAL
PAINLEVEL_OUTOF10: 3

## 2018-07-31 ASSESSMENT — PAIN - FUNCTIONAL ASSESSMENT: PAIN_FUNCTIONAL_ASSESSMENT: 0-10

## 2018-07-31 ASSESSMENT — PAIN DESCRIPTION - DESCRIPTORS: DESCRIPTORS: OTHER (COMMENT)

## 2018-07-31 ASSESSMENT — PAIN DESCRIPTION - PAIN TYPE: TYPE: SURGICAL PAIN

## 2018-07-31 NOTE — ANESTHESIA POSTPROCEDURE EVALUATION
Department of Anesthesiology  Postprocedure Note    Patient: Devante Tinsley  MRN: [id number]  YOB: 1960  Date of evaluation: 7/31/2018  Time:  4:07 PM     Procedure Summary     Date:  07/31/18 Room / Location:  [facility]    Anesthesia Start:  9805 Anesthesia Stop:  1152    Procedure:  SUPRA PUBIC LESIONS BIOPSY EXCISION (N/A ) Diagnosis:  (DX PENILE LESIONS)    Surgeon:  Gabriel Diamond MD Responsible Provider:  Danny Valdes MD    Anesthesia Type:  general ASA Status:  3          Anesthesia Type: general    Leonarda Phase I: Leonarda Score: 10    Leonarda Phase II:      Last vitals: Reviewed and per EMR flowsheets.        Anesthesia Post Evaluation    Patient location during evaluation: PACU  Patient participation: complete - patient participated  Level of consciousness: awake and alert  Pain score: 1  Airway patency: patent  Nausea & Vomiting: no nausea and no vomiting  Complications: no  Cardiovascular status: blood pressure returned to baseline  Respiratory status: acceptable  Hydration status: euvolemic

## 2018-07-31 NOTE — OP NOTE
[facility]    Operative Note    Ascencion Cee  YOB: 1960  [id number]      Pre-operative Diagnosis: Multiple suprapubic lesion and penile base lesion    Post-operative Diagnosis: Same    Procedure: Surgical excision of multiple lesions    Anesthesia: General    Surgeons/Assistants: Dr Dee Dee Ziegler    Estimated Blood Loss: less than 50     Complications: None    Specimens: Was Obtained: 3 specimens were sent to pathology    Indications: This patient is 57-year-old male, Presents  For surgical removal of multiple suprapubic lesion    Operative Findings: Patient was brought  To the operating room, positioned in supine,, proper patient identification prepping and draping     Subcutaneous injection  Of local anesthetic 0.5% Marcaine. The attention was then turned to the multiple suprapubic lesion. The first lesion to the right side of the pubic area measuring about 1.5 cm, an elliptical incision was made around the lesion which was then surgically lifted and removed with a layer of subcutaneous  Tissue. Bleeders were coagulated as encountered. We then proceeded with closure of the incision site using  3-0 Monocryl in a  Running fashion. The attention was then turned towards the lesion at the base of the penis,,This was excised in the same fashion  With Coagulation and closure Completed accordingly . The lesion in the left of the pubic area Was also addressed in the same fashion. At the completion,,  The specimens were sent to pathology . Patient was sent to recovery room  In stable condition.     Recommendations follow-up at the office to discuss pathology    Electronically signed by Smith Grimes MD on 7/31/2018 at 11:58 AM

## 2018-07-31 NOTE — ANESTHESIA PRE PROCEDURE
full  Mouth opening: > = 3 FB Dental: normal exam   (+) upper dentures and lower dentures      Pulmonary:normal exam  breath sounds clear to auscultation                             Cardiovascular:  Exercise tolerance: good (>4 METS),           Rhythm: regular  Rate: normal                    Neuro/Psych:               GI/Hepatic/Renal:             Endo/Other:                     Abdominal:       Abdomen: soft. Vascular:                                      Anesthesia Plan      general     ASA 3       Induction: intravenous. MIPS: Postoperative opioids intended and Prophylactic antiemetics administered. Anesthetic plan and risks discussed with patient. Use of blood products discussed with patient whom consented to blood products. Plan discussed with attending and CRNA.     Attending anesthesiologist reviewed and agrees with Maria T Lyle MD   7/31/2018

## 2018-07-31 NOTE — H&P
tingling extremities  BEHAVIOR/PSYCH:  negative for depression, anxiety     Physical Exam:   BP (!) 151/75   Pulse 84   Resp 16   Ht 6' (1.829 m)   Wt 268 lb 8.3 oz (121.8 kg)   SpO2 99%   BMI 36.42 kg/m²   No LMP for male patient. No obstetric history on file. No results for input(s): POCGLU in the last 72 hours.     General Appearance:  alert, well appearing, and in no acute distress  Mental status: oriented to person, place, and time with normal affect  Head:  normocephalic, atraumatic. Eye: no icterus, redness, pupils equal and reactive, extraocular eye movements intact, conjunctiva clear  Ear: normal external ear, no discharge, hearing intact  Nose:  no drainage noted  Mouth: mucous membranes moist  Neck: supple, no carotid bruits, thyroid not palpable  Lungs: Bilateral equal air entry, clear to ausculation, no wheezing, rales or rhonchi, normal effort  Cardiovascular: normal rate, regular rhythm, no murmur, gallop, rub. Abdomen: Soft, nontender, nondistended, normal bowel sounds, no hepatomegaly or splenomegaly  Neurologic: There are no new focal motor or sensory deficits, normal muscle tone and bulk, no abnormal sensation, normal speech, cranial nerves II through XII grossly intact  Skin: No gross lesions, rashes, bruising or bleeding on exposed skin area  Extremities:  peripheral pulses palpable, no pedal edema or calf pain with palpation  Psych: normal affect      Investigations:       Laboratory Testing:  Recent Results   No results found for this or any previous visit (from the past 24 hour(s)).        No results for input(s): HGB, HCT, WBC, MCV, PLATELET, NA, K, CL, CO2, BUN, CREATININE, GLUCOSE, INR, PROTIME, APTT, AST, ALT, LABALBU, HCG in the last 720 hours.     Imaging/Diagnostics:     EKG DONE 7/20/18     Diagnosis:       1. PENILE LESIONS      Plans:      1.  EXCISION AND BIOPSY OF PENILE LESIONS        ROBER Escalante CNP  7/20/2018  2:06 PM      Cosigned by: Hansa Ramirez MD at 7/21/2018  7:56 AM   Revision History                        Routing History

## 2018-08-02 ENCOUNTER — OFFICE VISIT (OUTPATIENT)
Dept: PULMONOLOGY | Age: 58
End: 2018-08-02
Payer: COMMERCIAL

## 2018-08-02 VITALS
SYSTOLIC BLOOD PRESSURE: 150 MMHG | RESPIRATION RATE: 14 BRPM | HEIGHT: 72 IN | DIASTOLIC BLOOD PRESSURE: 81 MMHG | BODY MASS INDEX: 36.57 KG/M2 | TEMPERATURE: 97.9 F | WEIGHT: 270 LBS | HEART RATE: 71 BPM | OXYGEN SATURATION: 99 %

## 2018-08-02 DIAGNOSIS — Z99.89 OSA ON CPAP: Primary | ICD-10-CM

## 2018-08-02 DIAGNOSIS — K21.9 GASTROESOPHAGEAL REFLUX DISEASE WITHOUT ESOPHAGITIS: ICD-10-CM

## 2018-08-02 DIAGNOSIS — I10 ESSENTIAL HYPERTENSION: ICD-10-CM

## 2018-08-02 DIAGNOSIS — E66.9 OBESITY (BMI 30.0-34.9): ICD-10-CM

## 2018-08-02 DIAGNOSIS — G47.33 OSA ON CPAP: Primary | ICD-10-CM

## 2018-08-02 DIAGNOSIS — N40.0 BENIGN PROSTATIC HYPERPLASIA WITHOUT LOWER URINARY TRACT SYMPTOMS: ICD-10-CM

## 2018-08-02 DIAGNOSIS — N52.01 ERECTILE DYSFUNCTION DUE TO ARTERIAL INSUFFICIENCY: ICD-10-CM

## 2018-08-02 LAB — DERMATOLOGY PATHOLOGY REPORT: NORMAL

## 2018-08-02 PROCEDURE — 3017F COLORECTAL CA SCREEN DOC REV: CPT | Performed by: INTERNAL MEDICINE

## 2018-08-02 PROCEDURE — 99213 OFFICE O/P EST LOW 20 MIN: CPT | Performed by: INTERNAL MEDICINE

## 2018-08-02 PROCEDURE — G8417 CALC BMI ABV UP PARAM F/U: HCPCS | Performed by: INTERNAL MEDICINE

## 2018-08-02 PROCEDURE — G8427 DOCREV CUR MEDS BY ELIG CLIN: HCPCS | Performed by: INTERNAL MEDICINE

## 2018-08-02 PROCEDURE — 1036F TOBACCO NON-USER: CPT | Performed by: INTERNAL MEDICINE

## 2018-08-02 ASSESSMENT — SLEEP AND FATIGUE QUESTIONNAIRES
HOW LIKELY ARE YOU TO NOD OFF OR FALL ASLEEP WHILE SITTING AND TALKING TO SOMEONE: 0
HOW LIKELY ARE YOU TO NOD OFF OR FALL ASLEEP IN A CAR, WHILE STOPPED FOR A FEW MINUTES IN TRAFFIC: 0
HOW LIKELY ARE YOU TO NOD OFF OR FALL ASLEEP WHILE WATCHING TV: 0
HOW LIKELY ARE YOU TO NOD OFF OR FALL ASLEEP WHILE SITTING AND READING: 1
HOW LIKELY ARE YOU TO NOD OFF OR FALL ASLEEP WHILE SITTING INACTIVE IN A PUBLIC PLACE: 0
HOW LIKELY ARE YOU TO NOD OFF OR FALL ASLEEP WHILE SITTING QUIETLY AFTER LUNCH WITHOUT ALCOHOL: 1
HOW LIKELY ARE YOU TO NOD OFF OR FALL ASLEEP WHEN YOU ARE A PASSENGER IN A CAR FOR AN HOUR WITHOUT A BREAK: 1
HOW LIKELY ARE YOU TO NOD OFF OR FALL ASLEEP WHILE LYING DOWN TO REST IN THE AFTERNOON WHEN CIRCUMSTANCES PERMIT: 1
ESS TOTAL SCORE: 4

## 2018-08-02 NOTE — PROGRESS NOTES
a few minutes in traffic 0 0 0   Total score 4 0 0   Neck circumference - 42 -     And upper sleepiness scale given to the patient the office revealed a score of 4, indicating lack oh daytime sleepiness. Review of Systems -as assistant was conducted for all other system including upper and lower extremities. No additional information was obtained other than being obese. General ROS: negative for - chills, fatigue, fever or weight loss  ENT ROS: negative for - headaches, oral lesions or sore throat  Cardiovascular ROS: no chest pain , orthopnea or pnd   Gastrointestinal ROS: no abdominal pain, change in bowel habits, or black or bloody stools  Skin - no rash   Neuro - no blurry vision , no loc . No focal weakness   msk - no jt tenderness or swelling    Vascular - no claudication , rest completed and negative   Lymphatic - complete and negative   Hematology - oncology - complete and negative   Allergy immunology - complete and negative    no burning or hematuria       LUNG CANCER SCREENING     1. CRITERIA MET    []     CT ORDERED  []      2. CRITERIA NOT MET   [x]      3. REFUSED                    []      Non smoker  REASON CRITERIA NOT MET     1. SMOKING LESS THAN 30 PY  []      2. AGE LESS THAN 55 or GREATER 77 YEARS  []      3. QUIT SMOKING 15 YEARS OR GREATER   []      4. RECENT CT WITH IN 11 MONTHS    []      5. LIFE EXPECTANCY < 5 YEARS   []      6.  SIGNS  AND SYMPTOMS OF LUNG CANCER   []           Immunization   Immunization History   Administered Date(s) Administered    Influenza Virus Vaccine 10/14/2014, 12/18/2015    Ann Garcia, 3 Years and older, IM 12/21/2016, 12/21/2017        Pneumococcal Vaccine     [] Up to date    [x] Indicated   [] Refused  [] Contraindicated       Influenza Vaccine   [x] Up to date    [] Indicated   [] Refused  [] Contraindicated       PAST MEDICAL HISTORY:         Diagnosis Date    BPH (benign prostatic hyperplasia)     Erectile dysfunction     GERD (gastroesophageal reflux disease)     Hiatal hernia     Hyperlipidemia     Hypertension     PONV (postoperative nausea and vomiting)     nauseated after last surgery    Unspecified sleep apnea     cpap nightly       Family History:   Family History   Problem Relation Age of Onset    Heart Disease Mother        SURGICAL HISTORY:   Past Surgical History:   Procedure Laterality Date    COLONOSCOPY      ENDOSCOPY, COLON, DIAGNOSTIC      HERNIA REPAIR  2010    SC DESTR PENIS LESN,SIMPL,SURG EXCIS N/A 7/31/2018    SUPRA PUBIC LESIONS BIOPSY EXCISION performed by Emmie Anderson MD at [address] Bilateral 07/31/2018    SUPRA PUBIC LESIONS BIOPSY EXCISION (N/A )    THROAT SURGERY  2008    TUMOR REMOVAL Right 09/23/2016    [facility]  right thigh   (benign)              Not in a hospital admission. No Known Allergies  History   Smoking Status    Never Smoker   Smokeless Tobacco    Never Used     Prior to Admission medications    Medication Sig Start Date End Date Taking? Authorizing Provider   cephALEXin (KEFLEX) 500 MG capsule Take 1 capsule by mouth 3 times daily for 4 days 7/31/18 8/4/18 Yes Emmie Anderson MD   Fexofenadine-Pseudoephedrine (ALLEGRA-D 24 HOUR PO) Take 1 tablet by mouth daily as needed   Yes Historical Provider, MD   amLODIPine (NORVASC) 10 MG tablet TAKE ONE TABLET BY MOUTH EVERY DAY 7/12/18  Yes Pk Leung MD   valsartan-hydrochlorothiazide (DIOVAN-HCT) 160-12.5 MG per tablet Take 1 tablet by mouth daily 4/17/18  Yes Pk Leung MD   pravastatin (PRAVACHOL) 40 MG tablet TAKE ONE TABLET BY MOUTH EVERY DAY. 3/14/18  Yes Pk Leung MD   tadalafil (CIALIS) 5 MG tablet Take 5 mg by mouth as needed for Erectile Dysfunction   Yes Historical Provider, MD   HYDROcodone-acetaminophen (NORCO) 5-325 MG per tablet Take 1 tablet by mouth every 6 hours as needed for Pain for up to 3 days. . Take lowest dose possible to manage pain.  Earliest Fill Date: 7/31/18 7/31/18 8/3/18  Emmie Anderson MD         Physical Exam  General Appearance:    Alert, cooperative, no distress, appears stated age, Obese    Head:    Normocephalic, without obvious abnormality, atraumatic   Eye examination revealed no jaundice. No Elías's syndrome     Revealed no polyps. No sinus tenderness     Throat examination unremarkable        :    Neck:   Supple, symmetrical, trachea midline, no adenopathy;     thyroid:  no enlargement/tenderness/nodules; no carotid    bruit or JVD. Short obese neck    Back:     Symmetric, no curvature, ROM normal, no CVA tenderness   Lungs:    Good air entry in both her breathing vesicular. No rales or rhonchi are audible. Percussion note is normal resonance    Chest Wall:    No tenderness or deformity      Heart:    Regular rate and rhythm, S1 and S2 normal, no murmur, rub        or gallop no rvh                           Abdomen:                                                 Pulses:                                            Lymph nodes:                    Neurologic:                  Soft, non-tender, bowel sounds active all four quadrants,     no masses, no organomegaly         2+ and symmetric all extremities            Cervical, supraclavicular not enlarged or matted or tender      CNII-XII intact, normal strength 5/5 . Sensation grossly normal  and reflexes normal 2+  throughout     Clubbing No  Lower ext edema No1+   [] , 2 +  [] , 3+   []  Upper ext edema No       Musculoskeletal - no joint swelling or tenderness or synovitis               BP (!) 150/81 (Site: Left Arm, Position: Sitting, Cuff Size: Medium Adult) Comment (Site): forearm - pt hasn't taken PB Rx yet today. Pulse 71   Temp 97.9 °F (36.6 °C)   Resp 14   Ht 6' (1.829 m)   Wt 270 lb (122.5 kg)   SpO2 99% Comment: room air at rest  BMI 36.62 kg/m²     CXR  No new x-rays      CT Scans  No CT scan    Echo  No active        Assessment   Diagnosis Orders   1. YUMIKO on CPAP     2. Essential hypertension     3.  Erectile dysfunction due to arterial insufficiency     4. Gastroesophageal reflux disease without esophagitis     5. Obesity (BMI 30.0-34.9)     6. Benign prostatic hyperplasia without lower urinary tract symptoms         Plan:  Patient's sleep apnea responding very well to the use of CPAP. I advised her to continue the use of CPAP or regular basis. He is having some difficulty falling asleep because his wife keeps the TV on and that does interfere with his sleep. Advised him to use earplugs. That way to help him to fall asleep earlier. However, he takes it maybe about 30 minutes to fall asleep, which is nothing unusual.  Once he doesn't fall asleep. He does not wake up. He wakes up refreshed next morning. I advised her to lose weight. He is participating in exercise program and hopefully will be able to lose weight. And also esophageal reflux symptoms are under good control and he'll continue the same therapy. His hypertension has been under good control. He'll continue the same regimen. He has benign prostatic hypertrophy and symptoms are controlled. He continued to have erectile dysfunction.    , I. . plan to see in follow-up in few months. Patient did not require any prescription for any supplies. Electronically signed by Joann Villegas MD on   8/2/18 at 10:37 AM  Please note that this chart was generated using voice recognition Dragon dictation software. Although every effort was made to ensure the accuracy of this automated transcription, some errors in transcription may have occurred.

## 2018-08-15 RX ORDER — VALSARTAN AND HYDROCHLOROTHIAZIDE 160; 12.5 MG/1; MG/1
TABLET, FILM COATED ORAL
Qty: 30 TABLET | Refills: 2 | Status: SHIPPED | OUTPATIENT
Start: 2018-08-15 | End: 2018-11-14 | Stop reason: SDUPTHER

## 2018-09-10 RX ORDER — PRAVASTATIN SODIUM 40 MG
TABLET ORAL
Qty: 90 TABLET | Refills: 0 | Status: SHIPPED | OUTPATIENT
Start: 2018-09-10 | End: 2018-12-10 | Stop reason: SDUPTHER

## 2018-11-19 ENCOUNTER — OFFICE VISIT (OUTPATIENT)
Dept: INTERNAL MEDICINE CLINIC | Age: 58
End: 2018-11-19
Payer: COMMERCIAL

## 2018-11-19 ENCOUNTER — HOSPITAL ENCOUNTER (OUTPATIENT)
Age: 58
Setting detail: SPECIMEN
Discharge: HOME | End: 2018-11-19
Payer: COMMERCIAL

## 2018-11-19 VITALS
SYSTOLIC BLOOD PRESSURE: 140 MMHG | DIASTOLIC BLOOD PRESSURE: 80 MMHG | WEIGHT: 263.6 LBS | HEIGHT: 72 IN | RESPIRATION RATE: 20 BRPM | BODY MASS INDEX: 35.7 KG/M2 | HEART RATE: 83 BPM | OXYGEN SATURATION: 99 %

## 2018-11-19 DIAGNOSIS — N52.8 OTHER MALE ERECTILE DYSFUNCTION: ICD-10-CM

## 2018-11-19 DIAGNOSIS — M67.919 TENDINOPATHY OF ROTATOR CUFF, UNSPECIFIED LATERALITY: ICD-10-CM

## 2018-11-19 DIAGNOSIS — I10 UNCONTROLLED HYPERTENSION: Primary | ICD-10-CM

## 2018-11-19 DIAGNOSIS — E78.2 MIXED HYPERLIPIDEMIA: ICD-10-CM

## 2018-11-19 DIAGNOSIS — Z99.89 OSA ON CPAP: ICD-10-CM

## 2018-11-19 DIAGNOSIS — G47.33 OSA ON CPAP: ICD-10-CM

## 2018-11-19 DIAGNOSIS — N28.9 RENAL INSUFFICIENCY: ICD-10-CM

## 2018-11-19 DIAGNOSIS — N40.0 BENIGN PROSTATIC HYPERPLASIA WITHOUT LOWER URINARY TRACT SYMPTOMS: ICD-10-CM

## 2018-11-19 DIAGNOSIS — K21.9 GASTROESOPHAGEAL REFLUX DISEASE WITHOUT ESOPHAGITIS: ICD-10-CM

## 2018-11-19 PROBLEM — N18.30 STAGE 3 CHRONIC KIDNEY DISEASE (HCC): Status: ACTIVE | Noted: 2018-11-19

## 2018-11-19 PROBLEM — L98.9 SKIN LESION: Status: RESOLVED | Noted: 2018-07-31 | Resolved: 2018-11-19

## 2018-11-19 LAB
ANION GAP SERPL CALCULATED.3IONS-SCNC: 12 MMOL/L (ref 9–17)
BUN BLDV-MCNC: 11 MG/DL (ref 6–20)
BUN/CREAT BLD: ABNORMAL (ref 9–20)
CALCIUM SERPL-MCNC: 9.5 MG/DL (ref 8.6–10.4)
CHLORIDE BLD-SCNC: 102 MMOL/L (ref 98–107)
CO2: 28 MMOL/L (ref 20–31)
CREAT SERPL-MCNC: 1.11 MG/DL (ref 0.7–1.2)
GFR AFRICAN AMERICAN: >60 ML/MIN
GFR NON-AFRICAN AMERICAN: >60 ML/MIN
GFR SERPL CREATININE-BSD FRML MDRD: ABNORMAL ML/MIN/{1.73_M2}
GFR SERPL CREATININE-BSD FRML MDRD: ABNORMAL ML/MIN/{1.73_M2}
GLUCOSE BLD-MCNC: 104 MG/DL (ref 70–99)
POTASSIUM SERPL-SCNC: 3.3 MMOL/L (ref 3.7–5.3)
SODIUM BLD-SCNC: 142 MMOL/L (ref 135–144)

## 2018-11-19 PROCEDURE — 90471 IMMUNIZATION ADMIN: CPT | Performed by: FAMILY MEDICINE

## 2018-11-19 PROCEDURE — G8482 FLU IMMUNIZE ORDER/ADMIN: HCPCS | Performed by: FAMILY MEDICINE

## 2018-11-19 PROCEDURE — 90688 IIV4 VACCINE SPLT 0.5 ML IM: CPT | Performed by: FAMILY MEDICINE

## 2018-11-19 PROCEDURE — G8417 CALC BMI ABV UP PARAM F/U: HCPCS | Performed by: FAMILY MEDICINE

## 2018-11-19 PROCEDURE — 99214 OFFICE O/P EST MOD 30 MIN: CPT | Performed by: FAMILY MEDICINE

## 2018-11-19 PROCEDURE — 3017F COLORECTAL CA SCREEN DOC REV: CPT | Performed by: FAMILY MEDICINE

## 2018-11-19 PROCEDURE — G8427 DOCREV CUR MEDS BY ELIG CLIN: HCPCS | Performed by: FAMILY MEDICINE

## 2018-11-19 PROCEDURE — 1036F TOBACCO NON-USER: CPT | Performed by: FAMILY MEDICINE

## 2018-11-19 ASSESSMENT — PATIENT HEALTH QUESTIONNAIRE - PHQ9
SUM OF ALL RESPONSES TO PHQ QUESTIONS 1-9: 0
SUM OF ALL RESPONSES TO PHQ QUESTIONS 1-9: 0
1. LITTLE INTEREST OR PLEASURE IN DOING THINGS: 0
2. FEELING DOWN, DEPRESSED OR HOPELESS: 0
SUM OF ALL RESPONSES TO PHQ9 QUESTIONS 1 & 2: 0

## 2018-11-19 NOTE — PROGRESS NOTES
Chronic Disease Visit Information    BP Readings from Last 3 Encounters:   11/19/18 (!) 140/80   08/02/18 (!) 150/81   07/31/18 (!) 106/57          Hemoglobin A1C (%)   Date Value   06/15/2018 5.3   06/21/2017 5.6   12/21/2016 5.3     LDL Cholesterol (mg/dL)   Date Value   06/21/2017 66     HDL (mg/dL)   Date Value   06/21/2017 57     BUN (mg/dL)   Date Value   07/20/2018 11     CREATININE (mg/dL)   Date Value   07/20/2018 1.22 (H)     Glucose (mg/dL)   Date Value   06/15/2018 96            Have you changed or started any medications since your last visit including any over-the-counter medicines, vitamins, or herbal medicines? no   Are you having any side effects from any of your medications? -  no  Have you stopped taking any of your medications? Is so, why? -  no    Have you seen any other physician or provider since your last visit? No  Have you had any other diagnostic tests since your last visit? No  Have you been seen in the emergency room and/or had an admission to a hospital since we last saw you? No  Have you had your annual diabetic retinal (eye) exam? No  Have you had your routine dental cleaning in the past 6 months? no    Have you activated your [employer] account? If not, what are your barriers? No     Patient Care Team:  Wendy Paris MD as PCP - General (Family Medicine)  Randy Forman DO as Consulting Physician (Pulmonology)  Azeem Reagan MD as Consulting Physician (Urology)  Jeanine Tucker MD as Consulting Physician (Pulmonology)         Medical History Review  Past Medical, Family, and Social History reviewed and does not contribute to the patient presenting condition    Vaccine Information Sheet, Wynn Moots - Inactivated\"  given to Cameron Braun, or parent/legal guardian of  Cameron Braun and verbalized understanding. Patient responses:    Have you ever had a reaction to a flu vaccine? No  Are you able to eat eggs without adverse effects? Yes  Do you have any current illness?   No  Have

## 2018-11-21 ENCOUNTER — TELEPHONE (OUTPATIENT)
Dept: INTERNAL MEDICINE CLINIC | Age: 58
End: 2018-11-21

## 2018-11-21 DIAGNOSIS — E83.51 HYPOCALCEMIA: Primary | ICD-10-CM

## 2018-11-21 RX ORDER — POTASSIUM CHLORIDE 20 MEQ/1
20 TABLET, EXTENDED RELEASE ORAL DAILY
Qty: 90 TABLET | Refills: 1 | Status: SHIPPED | OUTPATIENT
Start: 2018-11-21 | End: 2019-06-05 | Stop reason: SDUPTHER

## 2018-11-27 ENCOUNTER — APPOINTMENT (OUTPATIENT)
Dept: GENERAL RADIOLOGY | Age: 58
End: 2018-11-27
Payer: COMMERCIAL

## 2018-11-27 ENCOUNTER — HOSPITAL ENCOUNTER (EMERGENCY)
Age: 58
Discharge: HOME | End: 2018-11-27
Attending: EMERGENCY MEDICINE
Payer: COMMERCIAL

## 2018-11-27 VITALS
RESPIRATION RATE: 18 BRPM | DIASTOLIC BLOOD PRESSURE: 83 MMHG | HEIGHT: 72 IN | WEIGHT: 264.1 LBS | SYSTOLIC BLOOD PRESSURE: 162 MMHG | OXYGEN SATURATION: 98 % | TEMPERATURE: 98 F | BODY MASS INDEX: 35.77 KG/M2 | HEART RATE: 83 BPM

## 2018-11-27 DIAGNOSIS — M43.6 TORTICOLLIS: ICD-10-CM

## 2018-11-27 DIAGNOSIS — M54.13 RADICULOPATHY OF CERVICOTHORACIC REGION: Primary | ICD-10-CM

## 2018-11-27 PROCEDURE — 99283 EMERGENCY DEPT VISIT LOW MDM: CPT

## 2018-11-27 PROCEDURE — 72040 X-RAY EXAM NECK SPINE 2-3 VW: CPT

## 2018-11-27 PROCEDURE — 6360000002 HC RX W HCPCS: Performed by: EMERGENCY MEDICINE

## 2018-11-27 PROCEDURE — 6370000000 HC RX 637 (ALT 250 FOR IP): Performed by: EMERGENCY MEDICINE

## 2018-11-27 PROCEDURE — 96372 THER/PROPH/DIAG INJ SC/IM: CPT

## 2018-11-27 RX ORDER — OXYCODONE HYDROCHLORIDE AND ACETAMINOPHEN 5; 325 MG/1; MG/1
1-2 TABLET ORAL EVERY 6 HOURS PRN
Qty: 20 TABLET | Refills: 0 | Status: SHIPPED | OUTPATIENT
Start: 2018-11-27 | End: 2018-12-02

## 2018-11-27 RX ORDER — LIDOCAINE 50 MG/G
1 PATCH TOPICAL DAILY
Qty: 30 PATCH | Refills: 0 | Status: SHIPPED | OUTPATIENT
Start: 2018-11-27 | End: 2019-02-20

## 2018-11-27 RX ORDER — KETOROLAC TROMETHAMINE 30 MG/ML
60 INJECTION, SOLUTION INTRAMUSCULAR; INTRAVENOUS ONCE
Status: COMPLETED | OUTPATIENT
Start: 2018-11-27 | End: 2018-11-27

## 2018-11-27 RX ORDER — METAXALONE 800 MG/1
800 TABLET ORAL ONCE
Status: COMPLETED | OUTPATIENT
Start: 2018-11-27 | End: 2018-11-27

## 2018-11-27 RX ORDER — IBUPROFEN 800 MG/1
800 TABLET ORAL EVERY 8 HOURS PRN
Qty: 30 TABLET | Refills: 0 | Status: SHIPPED | OUTPATIENT
Start: 2018-11-27 | End: 2019-02-20

## 2018-11-27 RX ORDER — METHOCARBAMOL 750 MG/1
750 TABLET, FILM COATED ORAL 4 TIMES DAILY
Qty: 40 TABLET | Refills: 0 | Status: SHIPPED | OUTPATIENT
Start: 2018-11-27 | End: 2018-12-07

## 2018-11-27 RX ORDER — OXYCODONE HYDROCHLORIDE AND ACETAMINOPHEN 5; 325 MG/1; MG/1
1 TABLET ORAL ONCE
Status: COMPLETED | OUTPATIENT
Start: 2018-11-27 | End: 2018-11-27

## 2018-11-27 RX ADMIN — OXYCODONE AND ACETAMINOPHEN 1 TABLET: 5; 325 TABLET ORAL at 05:42

## 2018-11-27 RX ADMIN — KETOROLAC TROMETHAMINE 60 MG: 30 INJECTION, SOLUTION INTRAMUSCULAR at 05:43

## 2018-11-27 RX ADMIN — METAXALONE 800 MG: 800 TABLET ORAL at 05:42

## 2018-11-27 ASSESSMENT — PAIN SCALES - GENERAL
PAINLEVEL_OUTOF10: 8
PAINLEVEL_OUTOF10: 8

## 2018-11-27 NOTE — ED NOTES
Pt presents with c/o right shoulder and arm pain. Pt states the pain started on Saturday. Denies injury. Full ROM. States he has been taking Advil at home, took Advil at 1800 last night with no relief. Rates pain 8/10.       Elpidio Wagner RN  11/27/18 1596

## 2018-11-30 ENCOUNTER — OFFICE VISIT (OUTPATIENT)
Dept: INTERNAL MEDICINE CLINIC | Age: 58
End: 2018-11-30
Payer: COMMERCIAL

## 2018-11-30 VITALS
BODY MASS INDEX: 34.08 KG/M2 | OXYGEN SATURATION: 99 % | DIASTOLIC BLOOD PRESSURE: 82 MMHG | HEIGHT: 72 IN | SYSTOLIC BLOOD PRESSURE: 130 MMHG | RESPIRATION RATE: 24 BRPM | WEIGHT: 251.6 LBS | HEART RATE: 88 BPM

## 2018-11-30 DIAGNOSIS — M54.12 CERVICAL RADICULOPATHY: ICD-10-CM

## 2018-11-30 DIAGNOSIS — E78.2 MIXED HYPERLIPIDEMIA: ICD-10-CM

## 2018-11-30 DIAGNOSIS — G47.33 OSA ON CPAP: ICD-10-CM

## 2018-11-30 DIAGNOSIS — Z99.89 OSA ON CPAP: ICD-10-CM

## 2018-11-30 DIAGNOSIS — K21.9 GASTROESOPHAGEAL REFLUX DISEASE WITHOUT ESOPHAGITIS: ICD-10-CM

## 2018-11-30 DIAGNOSIS — I10 ESSENTIAL HYPERTENSION: ICD-10-CM

## 2018-11-30 DIAGNOSIS — N40.0 BENIGN PROSTATIC HYPERPLASIA WITHOUT LOWER URINARY TRACT SYMPTOMS: ICD-10-CM

## 2018-11-30 DIAGNOSIS — N18.30 STAGE 3 CHRONIC KIDNEY DISEASE (HCC): ICD-10-CM

## 2018-11-30 DIAGNOSIS — M50.20 CERVICAL DISCOGENIC PAIN SYNDROME: Primary | ICD-10-CM

## 2018-11-30 PROCEDURE — 1036F TOBACCO NON-USER: CPT | Performed by: FAMILY MEDICINE

## 2018-11-30 PROCEDURE — 3017F COLORECTAL CA SCREEN DOC REV: CPT | Performed by: FAMILY MEDICINE

## 2018-11-30 PROCEDURE — 99214 OFFICE O/P EST MOD 30 MIN: CPT | Performed by: FAMILY MEDICINE

## 2018-11-30 PROCEDURE — G8417 CALC BMI ABV UP PARAM F/U: HCPCS | Performed by: FAMILY MEDICINE

## 2018-11-30 PROCEDURE — G8427 DOCREV CUR MEDS BY ELIG CLIN: HCPCS | Performed by: FAMILY MEDICINE

## 2018-11-30 PROCEDURE — G8482 FLU IMMUNIZE ORDER/ADMIN: HCPCS | Performed by: FAMILY MEDICINE

## 2018-11-30 ASSESSMENT — ENCOUNTER SYMPTOMS
GASTROINTESTINAL NEGATIVE: 1
RESPIRATORY NEGATIVE: 1
ALLERGIC/IMMUNOLOGIC NEGATIVE: 1
EYES NEGATIVE: 1

## 2018-12-05 ENCOUNTER — HOSPITAL ENCOUNTER (OUTPATIENT)
Dept: PHYSICAL THERAPY | Facility: CLINIC | Age: 58
Setting detail: THERAPIES SERIES
Discharge: HOME | End: 2018-12-05
Payer: COMMERCIAL

## 2018-12-05 PROCEDURE — 97110 THERAPEUTIC EXERCISES: CPT

## 2018-12-05 PROCEDURE — G0283 ELEC STIM OTHER THAN WOUND: HCPCS

## 2018-12-05 PROCEDURE — 97162 PT EVAL MOD COMPLEX 30 MIN: CPT

## 2018-12-05 NOTE — CONSULTS
medical record [] None [] Other:  Allergies:      [] Refer to full medical record [] None [] Other:    Function:  Hand Dominance  [x] Right  [] Left  Working:    [x] Retired - [address] [facility] [employer]  Job/ADL Description:  Pain:  [x] Yes  [] No Location: right neck to right UE to second and third fingers Pain Rating: (0-10 scale) 9/10  Pain altered Tx:  [x] Yes  [] No  Action:  Symptoms:  [] Improving [] Worsening [x] Same  Better:  [] AM    [] PM    [] Sit    [] Rise/Sit    []Stand    [] Walk    [] Lying    [] Other:  Worse: [] AM    [] PM    [] Sit    [] Rise/Sit    []Stand    [] Walk    [] Lying    [] Bend                             [] Valsalva    [x] Other:turning head  Unable to find a position of relief  Sleep: [] OK    [x] Disturbed  Interests - used to bowl - now only on occasion - not currently. Just sold home, now in an apartment till new home is built. Move not expected till June. Just completed moving stuff to storage. Objective:      STRENGTH  STRENGTH  ROM    Left Right  Left Right Cervical    C5 Shld Abd  5 L1-2 Hip Flex   Flexion 34 * increased pain   Shld Flexion  5 Hip Abd   Extension 47   Shld IR   L3-4 Knee Ext   Rotation L 35 R 35   Shld ER   L4 Ankle DF   Sidebend L 45 R 40   C6 Elb Flex  5 L5 EHL   Retraction    C7 Elb Ext  5 S1 Plant. Flex   Lumbar    C8 EPL   Abdominals   Flexion    T1 Fing Abd   Erector Spinae   Extension     L3 109# 86#    Rotation L  R         Sidebend L R         UE/LE                                                              Wearing a collar all day except for showering. TESTS (+/-) LEFT RIGHT Not Tested   Cerv. Comp - + []   Cerv. Distraction - - []   Cerv. Alar/Transverse - - []   Vertebral Artery - - []   Kerwin Tests ? Pain ?  Pain No Change Not Tested   RFIS [x] [] [] []   COREY [x] [] [] []   RFIL [] [] [] [x]   REIL [] [] [] [x]   Rep Prot [x] [] [] []   Rep Retract [x] [] [] []     Unable to find a position of relief that decreases the

## 2018-12-10 ENCOUNTER — HOSPITAL ENCOUNTER (OUTPATIENT)
Dept: PHYSICAL THERAPY | Facility: CLINIC | Age: 58
Setting detail: THERAPIES SERIES
Discharge: HOME | End: 2018-12-10
Payer: COMMERCIAL

## 2018-12-10 PROCEDURE — G0283 ELEC STIM OTHER THAN WOUND: HCPCS

## 2018-12-10 PROCEDURE — 97110 THERAPEUTIC EXERCISES: CPT

## 2018-12-10 PROCEDURE — 97140 MANUAL THERAPY 1/> REGIONS: CPT

## 2018-12-10 RX ORDER — PRAVASTATIN SODIUM 40 MG
TABLET ORAL
Qty: 90 TABLET | Refills: 0 | Status: SHIPPED | OUTPATIENT
Start: 2018-12-10 | End: 2019-03-12 | Stop reason: SDUPTHER

## 2018-12-10 RX ORDER — VALSARTAN AND HYDROCHLOROTHIAZIDE 160; 12.5 MG/1; MG/1
TABLET, FILM COATED ORAL
Qty: 90 TABLET | Refills: 0 | Status: SHIPPED | OUTPATIENT
Start: 2018-12-10 | End: 2019-03-12 | Stop reason: SDUPTHER

## 2018-12-12 ENCOUNTER — HOSPITAL ENCOUNTER (OUTPATIENT)
Dept: PHYSICAL THERAPY | Facility: CLINIC | Age: 58
Setting detail: THERAPIES SERIES
Discharge: HOME | End: 2018-12-12
Payer: COMMERCIAL

## 2018-12-12 PROCEDURE — 97140 MANUAL THERAPY 1/> REGIONS: CPT

## 2018-12-12 PROCEDURE — 97110 THERAPEUTIC EXERCISES: CPT

## 2018-12-12 PROCEDURE — G0283 ELEC STIM OTHER THAN WOUND: HCPCS

## 2018-12-13 ENCOUNTER — HOSPITAL ENCOUNTER (OUTPATIENT)
Dept: PHYSICAL THERAPY | Facility: CLINIC | Age: 58
Setting detail: THERAPIES SERIES
Discharge: HOME | End: 2018-12-13
Payer: COMMERCIAL

## 2018-12-13 PROCEDURE — 97140 MANUAL THERAPY 1/> REGIONS: CPT

## 2018-12-13 PROCEDURE — G0283 ELEC STIM OTHER THAN WOUND: HCPCS

## 2018-12-13 PROCEDURE — 97110 THERAPEUTIC EXERCISES: CPT

## 2018-12-17 ENCOUNTER — HOSPITAL ENCOUNTER (OUTPATIENT)
Dept: PHYSICAL THERAPY | Facility: CLINIC | Age: 58
Setting detail: THERAPIES SERIES
Discharge: HOME | End: 2018-12-17
Payer: COMMERCIAL

## 2018-12-17 PROCEDURE — 97110 THERAPEUTIC EXERCISES: CPT

## 2018-12-17 PROCEDURE — 97140 MANUAL THERAPY 1/> REGIONS: CPT

## 2018-12-17 PROCEDURE — G0283 ELEC STIM OTHER THAN WOUND: HCPCS

## 2018-12-19 ENCOUNTER — HOSPITAL ENCOUNTER (OUTPATIENT)
Dept: PHYSICAL THERAPY | Facility: CLINIC | Age: 58
Setting detail: THERAPIES SERIES
Discharge: HOME | End: 2018-12-19
Payer: COMMERCIAL

## 2018-12-19 PROCEDURE — 97110 THERAPEUTIC EXERCISES: CPT

## 2018-12-19 PROCEDURE — 97140 MANUAL THERAPY 1/> REGIONS: CPT

## 2018-12-20 ENCOUNTER — HOSPITAL ENCOUNTER (OUTPATIENT)
Dept: PHYSICAL THERAPY | Facility: CLINIC | Age: 58
Setting detail: THERAPIES SERIES
Discharge: HOME | End: 2018-12-20
Payer: COMMERCIAL

## 2018-12-20 PROCEDURE — 97140 MANUAL THERAPY 1/> REGIONS: CPT

## 2018-12-20 PROCEDURE — G0283 ELEC STIM OTHER THAN WOUND: HCPCS

## 2018-12-20 PROCEDURE — 97110 THERAPEUTIC EXERCISES: CPT

## 2018-12-20 NOTE — FLOWSHEET NOTE
Increased reps 12/20   Other:     Specific Instructions for next treatment:       Treatment Charges: Mins Units   [x]  Modalities: HP/estim 15/15 0/1   [x]  Ther Exercise 19 1   [x]  Manual Therapy 11 1   []  Ther Activities     []  Aquatics     []  Vasocompression     []  Other     Total Treatment time 45 3        Assessment: [x] Progressing toward goals. Progressed repetitions as indicated with good tolerance. Patient still requesting estim at the end of treatment session. [] No change. [] Other:    STG: (to be met in 6 treatments)  1. ? Pain: from 9/10 to 6/10 maximum, met 12-12-18  2. Able to sleep 4 hours uninterrupted by neck pain/right arm symtpoms met 12-12-18No radicular symptoms in right arm distal to elbow  3. Ability to remove cervical collar for 2 hours x 3 during the day without increased symptoms. met 12-12-18 came to PT without collar   4. Independent with Home Exercise Programs, met 12-19-18  5.    LTG: (to be met in 12 treatments)  1. Right  strength increased to 100# to reflect decreased nerve impingement. 2. Sleep undisturbed by neck and right arm pain. , met 12-19-18, 5-6 hours now at at time verses barely at all at onset of PT  3. Full AROM turning, flexing, and bending neck for ADL's  4. Ability to lift 15# with right hand to complete household ADL's  5. Improve NDI by 2 levels for lifting(sec 3), reaching (4), sleeping (9) and driving (8)  Patient goals:decrease pain, full motion    Pt. Education:  [x] Yes  [] No  [x] Reviewed Prior HEP/Ed  Method of Education: [x] Verbal  [x] Demo  [x] Written tband exercises in chart  Comprehension of Education:  [x] Verbalizes understanding. [x] Demonstrates understanding. [] Needs review. [x] Demonstrates/verbalizes HEP/Ed previously given. Plan: [x] Continue per plan of care.    [] Other:      Time In: 8:25 am            Time Out: 9:16 am    Electronically signed by:  Milka Beckman PTA

## 2018-12-26 ENCOUNTER — HOSPITAL ENCOUNTER (OUTPATIENT)
Dept: PHYSICAL THERAPY | Facility: CLINIC | Age: 58
Setting detail: THERAPIES SERIES
Discharge: HOME | End: 2018-12-26
Payer: COMMERCIAL

## 2018-12-26 PROCEDURE — 97110 THERAPEUTIC EXERCISES: CPT

## 2018-12-26 PROCEDURE — 97140 MANUAL THERAPY 1/> REGIONS: CPT

## 2018-12-26 NOTE — FLOWSHEET NOTE
reps 12/20   Other:     Specific Instructions for next treatment:       Treatment Charges: Mins Units   [x]  Modalities: HP 15    [x]  Ther Exercise 20 1   [x]  Manual Therapy 11 1   []  Ther Activities     []  Aquatics     []  Vasocompression     []  Other     Total Treatment time 31 2        Assessment: [x] Progressing toward goals. Cervical AROM sitting - 12/26/2018  Flexion   53 WNL  Ext   47 WNL  Right side bend 42 WNL  Left side bend    42 WNL  Right rotation  35, WFL  Left rotation  32, WFL    [] No change. [x] Other:Neck Disability Index score of 14% overall impairment, most limited with sleeping    STG: (to be met in 6 treatments)  1. ? Pain: from 9/10 to 6/10 maximum, met 12-12-18  2. Able to sleep 4 hours uninterrupted by neck pain/right arm symtpoms met 12-12-18No radicular symptoms in right arm distal to elbow  3. Ability to remove cervical collar for 2 hours x 3 during the day without increased symptoms. met 12-12-18 came to PT without collar   4. Independent with Home Exercise Programs, met 12-19-18  5.    LTG: (to be met in 12 treatments)  1. Right  strength increased to 100# to reflect decreased nerve impingement. 2. Sleep undisturbed by neck and right arm pain. , met 12-19-18, 5-6 hours now at at time verses barely at all at onset of PT  3. Full AROM turning, flexing, and bending neck for ADL's met 12/26  4. Ability to lift 15# with right hand to complete household ADL's  5. Improve NDI by 2 levels for lifting(sec 3), reaching (4), sleeping (9) and driving (8), met 80-  Patient goals:decrease pain, full motion    Pt. Education:  [x] Yes  [] No  [x] Reviewed Prior HEP/Ed  Method of Education: [x] Verbal  [x] Demo  [x] Written tband exercises in chart  Comprehension of Education:  [x] Verbalizes understanding. [x] Demonstrates understanding. [] Needs review. [x] Demonstrates/verbalizes HEP/Ed previously given. Plan: [] Continue per plan of care.    [x] Other:pt to follow up

## 2018-12-28 ENCOUNTER — OFFICE VISIT (OUTPATIENT)
Dept: INTERNAL MEDICINE CLINIC | Age: 58
End: 2018-12-28
Payer: COMMERCIAL

## 2018-12-28 VITALS
RESPIRATION RATE: 20 BRPM | BODY MASS INDEX: 36.14 KG/M2 | SYSTOLIC BLOOD PRESSURE: 130 MMHG | WEIGHT: 266.8 LBS | DIASTOLIC BLOOD PRESSURE: 84 MMHG | HEIGHT: 72 IN | HEART RATE: 80 BPM | OXYGEN SATURATION: 99 %

## 2018-12-28 DIAGNOSIS — M50.20 CERVICAL DISCOGENIC PAIN SYNDROME: ICD-10-CM

## 2018-12-28 DIAGNOSIS — E78.2 MIXED HYPERLIPIDEMIA: ICD-10-CM

## 2018-12-28 DIAGNOSIS — N52.01 ERECTILE DYSFUNCTION DUE TO ARTERIAL INSUFFICIENCY: ICD-10-CM

## 2018-12-28 DIAGNOSIS — N40.0 BENIGN PROSTATIC HYPERPLASIA WITHOUT LOWER URINARY TRACT SYMPTOMS: ICD-10-CM

## 2018-12-28 DIAGNOSIS — G47.33 OSA ON CPAP: Primary | ICD-10-CM

## 2018-12-28 DIAGNOSIS — Z99.89 OSA ON CPAP: Primary | ICD-10-CM

## 2018-12-28 DIAGNOSIS — I10 ESSENTIAL HYPERTENSION: ICD-10-CM

## 2018-12-28 DIAGNOSIS — N18.30 STAGE 3 CHRONIC KIDNEY DISEASE (HCC): ICD-10-CM

## 2018-12-28 DIAGNOSIS — K21.9 GASTROESOPHAGEAL REFLUX DISEASE WITHOUT ESOPHAGITIS: ICD-10-CM

## 2018-12-28 PROCEDURE — 99214 OFFICE O/P EST MOD 30 MIN: CPT | Performed by: FAMILY MEDICINE

## 2018-12-28 PROCEDURE — G8417 CALC BMI ABV UP PARAM F/U: HCPCS | Performed by: FAMILY MEDICINE

## 2018-12-28 PROCEDURE — G8427 DOCREV CUR MEDS BY ELIG CLIN: HCPCS | Performed by: FAMILY MEDICINE

## 2018-12-28 PROCEDURE — 3017F COLORECTAL CA SCREEN DOC REV: CPT | Performed by: FAMILY MEDICINE

## 2018-12-28 PROCEDURE — 1036F TOBACCO NON-USER: CPT | Performed by: FAMILY MEDICINE

## 2018-12-28 PROCEDURE — G8482 FLU IMMUNIZE ORDER/ADMIN: HCPCS | Performed by: FAMILY MEDICINE

## 2018-12-28 RX ORDER — AMLODIPINE BESYLATE 10 MG/1
TABLET ORAL
Qty: 90 TABLET | Refills: 1 | Status: SHIPPED | OUTPATIENT
Start: 2018-12-28 | End: 2019-07-02 | Stop reason: SDUPTHER

## 2018-12-28 ASSESSMENT — ENCOUNTER SYMPTOMS
ALLERGIC/IMMUNOLOGIC NEGATIVE: 1
EYES NEGATIVE: 1
GASTROINTESTINAL NEGATIVE: 1
RESPIRATORY NEGATIVE: 1

## 2018-12-28 NOTE — PROGRESS NOTES
Subjective:      Patient ID: Danny Jc is a 59 y.o. male. Hypertension   This is a chronic problem. The current episode started more than 1 month ago. The problem has been gradually improving since onset. The problem is controlled. Risk factors for coronary artery disease include obesity, male gender and dyslipidemia. Past treatments include calcium channel blockers, angiotensin blockers and diuretics. The current treatment provides moderate improvement. There are no compliance problems. Identifiable causes of hypertension include a hypertension causing med and sleep apnea. Review of Systems   Constitutional: Negative. HENT: Negative. Eyes: Negative. Respiratory: Negative. Cardiovascular: Negative. Gastrointestinal: Negative. Endocrine: Negative. Musculoskeletal: Positive for arthralgias and myalgias. Skin: Negative. Allergic/Immunologic: Negative. Neurological: Negative. Hematological: Negative. Psychiatric/Behavioral: Negative. Past family and social history unremarkable. Objective:   Physical Exam   Constitutional: He is oriented to person, place, and time. He appears well-developed and well-nourished. Obesity with sleep apnea on positive pressure ventilation   HENT:   Head: Normocephalic and atraumatic. Right Ear: External ear normal.   Left Ear: External ear normal.   Nose: Nose normal.   Mouth/Throat: Oropharynx is clear and moist.   Eyes: Pupils are equal, round, and reactive to light. Conjunctivae and EOM are normal.   Neck: Normal range of motion. Neck supple. Cardiovascular: Normal rate, regular rhythm, normal heart sounds and intact distal pulses. Pulmonary/Chest: Effort normal and breath sounds normal.   Abdominal: Soft. Bowel sounds are normal.   Musculoskeletal: Normal range of motion.    Discogenic disease of cervical spine improving to physical therapy and anti-inflammatories   Neurological: He is alert and oriented to person, place,
cancer screen colonoscopy  07/24/2025    Flu vaccine  Completed    Hepatitis C screen  Completed    HIV screen  Completed

## 2019-01-08 NOTE — DISCHARGE SUMMARY
[] [address]  [address]  [phone]  F: [phone] [x] [address]  [facility]   [address]  P: [phone]  F: [phone] [] [place]  [address] [facility]  P: [phone]  F: [phone] [] [address]  [facility]   [address]   [place]: [phone]  F: [phone]     Physical Therapy Discharge Note    Date: 2019      Patient: Avtar Ramirez  : 1960  MRN: [id number]    Physician: Dr. Ihsan Cunningham: [facility] [place] [employer]  Diagnosis: cervical discogenic pain syndrome, cervical radiculopathy. Onset Date: 18                         Next Dr. Lawrence Mills: tbd  Visit# / total visits:                     Cancels/No Shows: 0/0     Date of initial visit: 2018                  Date of final visit: 2018      Subjective:  Pain:  [x] Yes  [] No          Location:         Pain Rating: (0-10 scale) 1/10  Pain altered Tx:  [x] No  [] Yes  Action:     Comments: Patient repots pain 1/10, slight tingling in right fingertips (2nd and 3rd)  and right elbow. Objective:  Cervical AROM sitting - 2018  Flexion                        53 WNL  Ext                               47 WNL  Right side bend           42 WNL  Left side bend                         42 WNL  Right rotation               35, WFL  Left rotation                 32, WFL                          [] No change. [x] Other:Neck Disability Index score of 14% overall impairment, most limited with sleeping    Assessment:  STG: (to be met in 6 treatments)  1. ? Pain: from 9/10 to 6/10 maximum, met 18  2.  Able to sleep 4 hours uninterrupted by neck pain/right arm symtpoms met 18No radicular symptoms in right arm

## 2019-01-18 ENCOUNTER — HOSPITAL ENCOUNTER (OUTPATIENT)
Dept: MRI IMAGING | Facility: CLINIC | Age: 59
Discharge: HOME | End: 2019-01-20
Payer: COMMERCIAL

## 2019-01-18 DIAGNOSIS — D36.7 BENIGN NEOPLASM OF OTHER SPECIFIED SITES: ICD-10-CM

## 2019-01-18 LAB — POC CREATININE: 1.1 MG/DL (ref 0.6–1.4)

## 2019-01-18 PROCEDURE — 82565 ASSAY OF CREATININE: CPT

## 2019-01-18 PROCEDURE — 6360000004 HC RX CONTRAST MEDICATION: Performed by: ORTHOPAEDIC SURGERY

## 2019-01-18 PROCEDURE — A9579 GAD-BASE MR CONTRAST NOS,1ML: HCPCS | Performed by: ORTHOPAEDIC SURGERY

## 2019-01-18 PROCEDURE — 73720 MRI LWR EXTREMITY W/O&W/DYE: CPT

## 2019-01-18 RX ADMIN — GADOTERIDOL 20 ML: 279.3 INJECTION, SOLUTION INTRAVENOUS at 13:40

## 2019-02-04 ENCOUNTER — OFFICE VISIT (OUTPATIENT)
Dept: PULMONOLOGY | Age: 59
End: 2019-02-04
Payer: COMMERCIAL

## 2019-02-04 VITALS
SYSTOLIC BLOOD PRESSURE: 134 MMHG | BODY MASS INDEX: 37.52 KG/M2 | OXYGEN SATURATION: 98 % | WEIGHT: 277 LBS | RESPIRATION RATE: 14 BRPM | HEART RATE: 76 BPM | DIASTOLIC BLOOD PRESSURE: 87 MMHG | HEIGHT: 72 IN

## 2019-02-04 DIAGNOSIS — N52.01 ERECTILE DYSFUNCTION DUE TO ARTERIAL INSUFFICIENCY: ICD-10-CM

## 2019-02-04 DIAGNOSIS — G47.33 OSA ON CPAP: Primary | ICD-10-CM

## 2019-02-04 DIAGNOSIS — E66.9 OBESITY (BMI 30.0-34.9): ICD-10-CM

## 2019-02-04 DIAGNOSIS — N40.0 BENIGN PROSTATIC HYPERPLASIA WITHOUT LOWER URINARY TRACT SYMPTOMS: ICD-10-CM

## 2019-02-04 DIAGNOSIS — I10 ESSENTIAL HYPERTENSION: ICD-10-CM

## 2019-02-04 DIAGNOSIS — Z99.89 OSA ON CPAP: Primary | ICD-10-CM

## 2019-02-04 DIAGNOSIS — K21.9 GASTROESOPHAGEAL REFLUX DISEASE WITHOUT ESOPHAGITIS: ICD-10-CM

## 2019-02-04 PROCEDURE — G8427 DOCREV CUR MEDS BY ELIG CLIN: HCPCS | Performed by: INTERNAL MEDICINE

## 2019-02-04 PROCEDURE — 3017F COLORECTAL CA SCREEN DOC REV: CPT | Performed by: INTERNAL MEDICINE

## 2019-02-04 PROCEDURE — 99213 OFFICE O/P EST LOW 20 MIN: CPT | Performed by: INTERNAL MEDICINE

## 2019-02-04 PROCEDURE — G8417 CALC BMI ABV UP PARAM F/U: HCPCS | Performed by: INTERNAL MEDICINE

## 2019-02-04 PROCEDURE — 1036F TOBACCO NON-USER: CPT | Performed by: INTERNAL MEDICINE

## 2019-02-04 PROCEDURE — G8482 FLU IMMUNIZE ORDER/ADMIN: HCPCS | Performed by: INTERNAL MEDICINE

## 2019-02-04 ASSESSMENT — SLEEP AND FATIGUE QUESTIONNAIRES
HOW LIKELY ARE YOU TO NOD OFF OR FALL ASLEEP WHILE SITTING AND TALKING TO SOMEONE: 0
HOW LIKELY ARE YOU TO NOD OFF OR FALL ASLEEP WHILE SITTING QUIETLY AFTER LUNCH WITHOUT ALCOHOL: 1
HOW LIKELY ARE YOU TO NOD OFF OR FALL ASLEEP WHILE WATCHING TV: 1
HOW LIKELY ARE YOU TO NOD OFF OR FALL ASLEEP IN A CAR, WHILE STOPPED FOR A FEW MINUTES IN TRAFFIC: 0
HOW LIKELY ARE YOU TO NOD OFF OR FALL ASLEEP WHILE SITTING INACTIVE IN A PUBLIC PLACE: 1
ESS TOTAL SCORE: 7
HOW LIKELY ARE YOU TO NOD OFF OR FALL ASLEEP WHILE LYING DOWN TO REST IN THE AFTERNOON WHEN CIRCUMSTANCES PERMIT: 2
HOW LIKELY ARE YOU TO NOD OFF OR FALL ASLEEP WHILE SITTING AND READING: 1
HOW LIKELY ARE YOU TO NOD OFF OR FALL ASLEEP WHEN YOU ARE A PASSENGER IN A CAR FOR AN HOUR WITHOUT A BREAK: 1

## 2019-02-20 ENCOUNTER — TELEPHONE (OUTPATIENT)
Dept: FAMILY MEDICINE CLINIC | Age: 59
End: 2019-02-20

## 2019-02-20 ENCOUNTER — OFFICE VISIT (OUTPATIENT)
Dept: FAMILY MEDICINE CLINIC | Age: 59
End: 2019-02-20
Payer: COMMERCIAL

## 2019-02-20 VITALS
TEMPERATURE: 97.9 F | OXYGEN SATURATION: 97 % | SYSTOLIC BLOOD PRESSURE: 130 MMHG | HEART RATE: 71 BPM | RESPIRATION RATE: 16 BRPM | WEIGHT: 276.2 LBS | DIASTOLIC BLOOD PRESSURE: 68 MMHG | BODY MASS INDEX: 37.41 KG/M2 | HEIGHT: 72 IN

## 2019-02-20 DIAGNOSIS — R09.89 VEIN SYMPTOM: Primary | ICD-10-CM

## 2019-02-20 DIAGNOSIS — R22.2 LUMP IN CHEST: Primary | ICD-10-CM

## 2019-02-20 DIAGNOSIS — R09.89 VEIN SYMPTOM: ICD-10-CM

## 2019-02-20 PROCEDURE — 1036F TOBACCO NON-USER: CPT | Performed by: NURSE PRACTITIONER

## 2019-02-20 PROCEDURE — G8482 FLU IMMUNIZE ORDER/ADMIN: HCPCS | Performed by: NURSE PRACTITIONER

## 2019-02-20 PROCEDURE — G8417 CALC BMI ABV UP PARAM F/U: HCPCS | Performed by: NURSE PRACTITIONER

## 2019-02-20 PROCEDURE — 99202 OFFICE O/P NEW SF 15 MIN: CPT | Performed by: NURSE PRACTITIONER

## 2019-02-20 PROCEDURE — G8427 DOCREV CUR MEDS BY ELIG CLIN: HCPCS | Performed by: NURSE PRACTITIONER

## 2019-02-20 PROCEDURE — 3017F COLORECTAL CA SCREEN DOC REV: CPT | Performed by: NURSE PRACTITIONER

## 2019-02-20 RX ORDER — METHOCARBAMOL 750 MG/1
TABLET, FILM COATED ORAL
COMMUNITY
End: 2019-02-20

## 2019-02-20 RX ORDER — POTASSIUM CHLORIDE 20 MEQ/1
TABLET, EXTENDED RELEASE ORAL
COMMUNITY
End: 2019-03-26 | Stop reason: ALTCHOICE

## 2019-02-20 RX ORDER — IBUPROFEN 800 MG/1
TABLET ORAL
COMMUNITY
End: 2019-02-20

## 2019-02-20 ASSESSMENT — PATIENT HEALTH QUESTIONNAIRE - PHQ9
SUM OF ALL RESPONSES TO PHQ QUESTIONS 1-9: 0
1. LITTLE INTEREST OR PLEASURE IN DOING THINGS: 0
SUM OF ALL RESPONSES TO PHQ9 QUESTIONS 1 & 2: 0
SUM OF ALL RESPONSES TO PHQ QUESTIONS 1-9: 0
2. FEELING DOWN, DEPRESSED OR HOPELESS: 0

## 2019-02-21 ENCOUNTER — HOSPITAL ENCOUNTER (OUTPATIENT)
Dept: MRI IMAGING | Facility: CLINIC | Age: 59
Discharge: HOME | End: 2019-02-23
Payer: COMMERCIAL

## 2019-02-21 DIAGNOSIS — D36.7 BENIGN NEOPLASM OF OTHER SPECIFIED SITES: ICD-10-CM

## 2019-02-21 PROCEDURE — 73720 MRI LWR EXTREMITY W/O&W/DYE: CPT

## 2019-02-21 PROCEDURE — A9579 GAD-BASE MR CONTRAST NOS,1ML: HCPCS | Performed by: ORTHOPAEDIC SURGERY

## 2019-02-21 PROCEDURE — 6360000004 HC RX CONTRAST MEDICATION: Performed by: ORTHOPAEDIC SURGERY

## 2019-02-21 RX ADMIN — GADOTERIDOL 20 ML: 279.3 INJECTION, SOLUTION INTRAVENOUS at 14:22

## 2019-02-25 ENCOUNTER — HOSPITAL ENCOUNTER (OUTPATIENT)
Dept: MAMMOGRAPHY | Age: 59
Discharge: HOME | End: 2019-02-27
Payer: COMMERCIAL

## 2019-02-25 ENCOUNTER — HOSPITAL ENCOUNTER (OUTPATIENT)
Dept: ULTRASOUND IMAGING | Age: 59
Discharge: HOME | End: 2019-02-27
Payer: COMMERCIAL

## 2019-02-25 DIAGNOSIS — R09.89 VEIN SYMPTOM: ICD-10-CM

## 2019-02-25 PROCEDURE — 76642 ULTRASOUND BREAST LIMITED: CPT

## 2019-03-12 RX ORDER — PRAVASTATIN SODIUM 40 MG
TABLET ORAL
Qty: 90 TABLET | Refills: 0 | Status: SHIPPED | OUTPATIENT
Start: 2019-03-12 | End: 2019-06-05 | Stop reason: SDUPTHER

## 2019-03-12 RX ORDER — VALSARTAN AND HYDROCHLOROTHIAZIDE 160; 12.5 MG/1; MG/1
TABLET, FILM COATED ORAL
Qty: 90 TABLET | Refills: 0 | Status: SHIPPED | OUTPATIENT
Start: 2019-03-12 | End: 2019-06-05 | Stop reason: SDUPTHER

## 2019-03-26 ENCOUNTER — OFFICE VISIT (OUTPATIENT)
Dept: INTERNAL MEDICINE CLINIC | Age: 59
End: 2019-03-26
Payer: COMMERCIAL

## 2019-03-26 VITALS
WEIGHT: 275 LBS | OXYGEN SATURATION: 99 % | DIASTOLIC BLOOD PRESSURE: 72 MMHG | HEIGHT: 72 IN | HEART RATE: 68 BPM | BODY MASS INDEX: 37.25 KG/M2 | SYSTOLIC BLOOD PRESSURE: 128 MMHG

## 2019-03-26 DIAGNOSIS — I10 ESSENTIAL HYPERTENSION: Primary | ICD-10-CM

## 2019-03-26 DIAGNOSIS — Z99.89 OSA ON CPAP: ICD-10-CM

## 2019-03-26 DIAGNOSIS — N52.01 ERECTILE DYSFUNCTION DUE TO ARTERIAL INSUFFICIENCY: ICD-10-CM

## 2019-03-26 DIAGNOSIS — N18.30 STAGE 3 CHRONIC KIDNEY DISEASE (HCC): ICD-10-CM

## 2019-03-26 DIAGNOSIS — K21.9 GASTROESOPHAGEAL REFLUX DISEASE WITHOUT ESOPHAGITIS: ICD-10-CM

## 2019-03-26 DIAGNOSIS — E78.2 MIXED HYPERLIPIDEMIA: ICD-10-CM

## 2019-03-26 DIAGNOSIS — N40.0 BENIGN PROSTATIC HYPERPLASIA WITHOUT LOWER URINARY TRACT SYMPTOMS: ICD-10-CM

## 2019-03-26 DIAGNOSIS — M67.919 TENDINOPATHY OF ROTATOR CUFF, UNSPECIFIED LATERALITY: ICD-10-CM

## 2019-03-26 DIAGNOSIS — G47.33 OSA ON CPAP: ICD-10-CM

## 2019-03-26 DIAGNOSIS — E66.9 OBESITY (BMI 35.0-39.9 WITHOUT COMORBIDITY): ICD-10-CM

## 2019-03-26 PROCEDURE — 3017F COLORECTAL CA SCREEN DOC REV: CPT | Performed by: FAMILY MEDICINE

## 2019-03-26 PROCEDURE — G8427 DOCREV CUR MEDS BY ELIG CLIN: HCPCS | Performed by: FAMILY MEDICINE

## 2019-03-26 PROCEDURE — 99214 OFFICE O/P EST MOD 30 MIN: CPT | Performed by: FAMILY MEDICINE

## 2019-03-26 PROCEDURE — G8417 CALC BMI ABV UP PARAM F/U: HCPCS | Performed by: FAMILY MEDICINE

## 2019-03-26 PROCEDURE — G8482 FLU IMMUNIZE ORDER/ADMIN: HCPCS | Performed by: FAMILY MEDICINE

## 2019-03-26 PROCEDURE — 1036F TOBACCO NON-USER: CPT | Performed by: FAMILY MEDICINE

## 2019-03-27 ASSESSMENT — ENCOUNTER SYMPTOMS
BACK PAIN: 1
RESPIRATORY NEGATIVE: 1
ALLERGIC/IMMUNOLOGIC NEGATIVE: 1
EYES NEGATIVE: 1
GASTROINTESTINAL NEGATIVE: 1

## 2019-04-01 ENCOUNTER — HOSPITAL ENCOUNTER (OUTPATIENT)
Age: 59
Setting detail: SPECIMEN
Discharge: HOME | End: 2019-04-01
Payer: COMMERCIAL

## 2019-04-01 DIAGNOSIS — E78.2 MIXED HYPERLIPIDEMIA: ICD-10-CM

## 2019-04-01 DIAGNOSIS — N40.0 BENIGN PROSTATIC HYPERPLASIA WITHOUT LOWER URINARY TRACT SYMPTOMS: ICD-10-CM

## 2019-04-01 DIAGNOSIS — N18.30 STAGE 3 CHRONIC KIDNEY DISEASE (HCC): ICD-10-CM

## 2019-04-01 LAB
ANION GAP SERPL CALCULATED.3IONS-SCNC: 11 MMOL/L (ref 9–17)
BUN BLDV-MCNC: 13 MG/DL (ref 8–23)
BUN/CREAT BLD: ABNORMAL (ref 9–20)
CALCIUM SERPL-MCNC: 9.2 MG/DL (ref 8.6–10.4)
CHLORIDE BLD-SCNC: 106 MMOL/L (ref 98–107)
CHOLESTEROL/HDL RATIO: 2.5
CHOLESTEROL: 147 MG/DL
CO2: 27 MMOL/L (ref 20–31)
CREAT SERPL-MCNC: 1.08 MG/DL (ref 0.7–1.2)
GFR AFRICAN AMERICAN: >60 ML/MIN
GFR NON-AFRICAN AMERICAN: >60 ML/MIN
GFR SERPL CREATININE-BSD FRML MDRD: ABNORMAL ML/MIN/{1.73_M2}
GFR SERPL CREATININE-BSD FRML MDRD: ABNORMAL ML/MIN/{1.73_M2}
GLUCOSE BLD-MCNC: 97 MG/DL (ref 70–99)
HDLC SERPL-MCNC: 58 MG/DL
LDL CHOLESTEROL: 77 MG/DL (ref 0–130)
POTASSIUM SERPL-SCNC: 3.5 MMOL/L (ref 3.7–5.3)
PROSTATE SPECIFIC ANTIGEN: 2.02 UG/L
SODIUM BLD-SCNC: 144 MMOL/L (ref 135–144)
TRIGL SERPL-MCNC: 59 MG/DL
VLDLC SERPL CALC-MCNC: NORMAL MG/DL (ref 1–30)

## 2019-04-24 ENCOUNTER — TELEPHONE (OUTPATIENT)
Dept: INTERNAL MEDICINE CLINIC | Age: 59
End: 2019-04-24

## 2019-04-24 DIAGNOSIS — R92.8 ABNORMAL MAMMOGRAM OF RIGHT BREAST: Primary | ICD-10-CM

## 2019-04-24 DIAGNOSIS — R92.8 ABNORMAL ULTRASOUND OF BREAST: ICD-10-CM

## 2019-04-24 DIAGNOSIS — M79.89 FAT NECROSIS: ICD-10-CM

## 2019-04-29 ENCOUNTER — HOSPITAL ENCOUNTER (OUTPATIENT)
Dept: ULTRASOUND IMAGING | Age: 59
Discharge: HOME | End: 2019-05-01
Payer: COMMERCIAL

## 2019-04-29 DIAGNOSIS — R92.8 ABNORMAL ULTRASOUND OF BREAST: ICD-10-CM

## 2019-04-29 DIAGNOSIS — M79.89 FAT NECROSIS: ICD-10-CM

## 2019-04-29 PROCEDURE — 76642 ULTRASOUND BREAST LIMITED: CPT

## 2019-06-05 DIAGNOSIS — E83.51 HYPOCALCEMIA: ICD-10-CM

## 2019-06-05 RX ORDER — POTASSIUM CHLORIDE 20 MEQ/1
TABLET, EXTENDED RELEASE ORAL
Qty: 90 TABLET | Refills: 0 | Status: SHIPPED | OUTPATIENT
Start: 2019-06-05 | End: 2019-09-30 | Stop reason: SDUPTHER

## 2019-06-05 RX ORDER — PRAVASTATIN SODIUM 40 MG
TABLET ORAL
Qty: 90 TABLET | Refills: 0 | Status: SHIPPED | OUTPATIENT
Start: 2019-06-05 | End: 2019-09-09 | Stop reason: SDUPTHER

## 2019-06-05 RX ORDER — VALSARTAN AND HYDROCHLOROTHIAZIDE 160; 12.5 MG/1; MG/1
TABLET, FILM COATED ORAL
Qty: 90 TABLET | Refills: 0 | Status: SHIPPED | OUTPATIENT
Start: 2019-06-05 | End: 2019-09-09 | Stop reason: SDUPTHER

## 2019-06-26 ENCOUNTER — OFFICE VISIT (OUTPATIENT)
Dept: INTERNAL MEDICINE CLINIC | Age: 59
End: 2019-06-26
Payer: COMMERCIAL

## 2019-06-26 VITALS
WEIGHT: 281.2 LBS | BODY MASS INDEX: 38.09 KG/M2 | OXYGEN SATURATION: 99 % | SYSTOLIC BLOOD PRESSURE: 130 MMHG | HEIGHT: 72 IN | DIASTOLIC BLOOD PRESSURE: 80 MMHG | HEART RATE: 83 BPM | RESPIRATION RATE: 20 BRPM

## 2019-06-26 DIAGNOSIS — I10 ESSENTIAL HYPERTENSION: ICD-10-CM

## 2019-06-26 DIAGNOSIS — M67.919 TENDINOPATHY OF ROTATOR CUFF, UNSPECIFIED LATERALITY: ICD-10-CM

## 2019-06-26 DIAGNOSIS — N52.01 ERECTILE DYSFUNCTION DUE TO ARTERIAL INSUFFICIENCY: ICD-10-CM

## 2019-06-26 DIAGNOSIS — N40.0 BENIGN PROSTATIC HYPERPLASIA WITHOUT LOWER URINARY TRACT SYMPTOMS: ICD-10-CM

## 2019-06-26 DIAGNOSIS — Z99.89 OSA ON CPAP: ICD-10-CM

## 2019-06-26 DIAGNOSIS — E66.9 OBESITY (BMI 35.0-39.9 WITHOUT COMORBIDITY): ICD-10-CM

## 2019-06-26 DIAGNOSIS — N18.30 STAGE 3 CHRONIC KIDNEY DISEASE (HCC): ICD-10-CM

## 2019-06-26 DIAGNOSIS — K21.9 GASTROESOPHAGEAL REFLUX DISEASE WITHOUT ESOPHAGITIS: ICD-10-CM

## 2019-06-26 DIAGNOSIS — G47.33 OSA ON CPAP: ICD-10-CM

## 2019-06-26 DIAGNOSIS — F41.9 ANXIETY HYPERVENTILATION: ICD-10-CM

## 2019-06-26 DIAGNOSIS — E78.00 PURE HYPERCHOLESTEROLEMIA: ICD-10-CM

## 2019-06-26 DIAGNOSIS — R00.2 PALPITATION: Primary | ICD-10-CM

## 2019-06-26 DIAGNOSIS — F45.8 ANXIETY HYPERVENTILATION: ICD-10-CM

## 2019-06-26 PROCEDURE — 90471 IMMUNIZATION ADMIN: CPT | Performed by: FAMILY MEDICINE

## 2019-06-26 PROCEDURE — 90715 TDAP VACCINE 7 YRS/> IM: CPT | Performed by: FAMILY MEDICINE

## 2019-06-26 PROCEDURE — 99214 OFFICE O/P EST MOD 30 MIN: CPT | Performed by: FAMILY MEDICINE

## 2019-06-26 PROCEDURE — 1036F TOBACCO NON-USER: CPT | Performed by: FAMILY MEDICINE

## 2019-06-26 PROCEDURE — 3017F COLORECTAL CA SCREEN DOC REV: CPT | Performed by: FAMILY MEDICINE

## 2019-06-26 PROCEDURE — G8417 CALC BMI ABV UP PARAM F/U: HCPCS | Performed by: FAMILY MEDICINE

## 2019-06-26 PROCEDURE — G8427 DOCREV CUR MEDS BY ELIG CLIN: HCPCS | Performed by: FAMILY MEDICINE

## 2019-06-26 NOTE — PROGRESS NOTES
Subjective:      Patient ID: Richa Waller is a 59 y.o. male. Hypertension   This is a chronic problem. The current episode started more than 1 month ago. The problem has been gradually improving since onset. The problem is controlled. Associated symptoms include anxiety. Risk factors for coronary artery disease include stress, male gender and dyslipidemia. Past treatments include calcium channel blockers and angiotensin blockers. The current treatment provides moderate improvement. Compliance problems include psychosocial issues. Review of Systems   Constitutional: Negative. HENT: Negative. Eyes: Negative. Respiratory: Negative. Cardiovascular: Negative. Gastrointestinal: Negative. Endocrine: Negative. Musculoskeletal: Positive for arthralgias. Skin: Negative. Allergic/Immunologic: Negative. Neurological: Negative. Hematological: Negative. Psychiatric/Behavioral: Positive for agitation. The patient is nervous/anxious. Past family and social history unremarkable. Diagnosis Orders   1. Palpitation     2. Benign prostatic hyperplasia without lower urinary tract symptoms     3. Erectile dysfunction due to arterial insufficiency     4. Gastroesophageal reflux disease without esophagitis     5. Essential hypertension     6. Pure hypercholesterolemia     7. Tendinopathy of rotator cuff, unspecified laterality     8. YUMIKO on CPAP     9. Stage 3 chronic kidney disease ([address]     10. Obesity (BMI 35.0-39.9 without comorbidity)     11. Anxiety hyperventilation           Objective:   Physical Exam   Constitutional: He is oriented to person, place, and time. He appears well-developed and well-nourished. HENT:   Head: Normocephalic and atraumatic. Right Ear: External ear normal.   Left Ear: External ear normal.   Nose: Nose normal.   Mouth/Throat: Oropharynx is clear and moist.   Eyes: Pupils are equal, round, and reactive to light.  Conjunctivae and EOM are normal.   Neck: Normal range of motion. Neck supple. Cardiovascular: Normal rate, regular rhythm, normal heart sounds and intact distal pulses. Pulmonary/Chest: Effort normal and breath sounds normal.   Abdominal: Soft. Bowel sounds are normal.   Musculoskeletal: Normal range of motion. Musculoskeletal pain   Neurological: He is alert and oriented to person, place, and time. He has normal reflexes. Skin: Skin is warm and dry. Psychiatric:   Anxiety/depression. Incompatible family dynamics, difficulty dealing with his adult children. He is established with psychology. He stopped taking SSRI because he did not like the side effects   Nursing note and vitals reviewed. Assessment:       Diagnosis Orders   1. Palpitation     2. Benign prostatic hyperplasia without lower urinary tract symptoms     3. Erectile dysfunction due to arterial insufficiency     4. Gastroesophageal reflux disease without esophagitis     5. Essential hypertension     6. Pure hypercholesterolemia     7. Tendinopathy of rotator cuff, unspecified laterality     8. YUMIKO on CPAP     9. Stage 3 chronic kidney disease ([facility]     10. Obesity (BMI 35.0-39.9 without comorbidity)     11. Anxiety hyperventilation             Plan:      58-year-old -American male returns for follow-up. He is afebrile hemodynamically stable  Hypertension well controlled on Diovan HCT and amlodipine that he has been tolerating well. He is advised to continue current regimen, low-fat high-fiber diet, stress relaxation with consumption of less than 2 g of salt a day  Psychogenic palpitation. Underlying history of anxiety and depression. Is complaining of difficult relationship with his grown up children. He is established with psychology. He was placed on SSRI, however, he says that he did not like the side effects and stopped taking it. I offered him other antianxiety/stress medication that he declined.   Advised him to follow closely with psychologist.  He denies suicidality  Chronic kidney disease with creatinine of 1.08. Maintain oral hydration. Rotator cuff arthropathy. Baseline stable. Continues to have good vibratory function. Erectile dysfunction with only modest improvement to Viagra. Multifactorial with underlying history of significant stress. Risk factor stratification is advised. He wants to proceed with urology consultation. He denies tobacco, excessive alcohol or illicit drug use  Influenza, Pneumovax and Tdap are being updated  He is updated on colonoscopy  Further recommendations to follow  This note is created with a voice recognition program and while intend to generate a document that accurately reflects the content of the visit, no guarantee can be provided that every mistake has been identified and corrected by editing.           Raffy Sears MD

## 2019-06-26 NOTE — PROGRESS NOTES
Chronic Disease Visit Information    BP Readings from Last 3 Encounters:   03/26/19 128/72   02/20/19 130/68   02/04/19 134/87          Hemoglobin A1C (%)   Date Value   06/15/2018 5.3   06/21/2017 5.6   12/21/2016 5.3     LDL Cholesterol (mg/dL)   Date Value   04/01/2019 77     HDL (mg/dL)   Date Value   04/01/2019 58     BUN (mg/dL)   Date Value   04/01/2019 13     CREATININE (mg/dL)   Date Value   04/01/2019 1.08     Glucose (mg/dL)   Date Value   04/01/2019 97            Have you changed or started any medications since your last visit including any over-the-counter medicines, vitamins, or herbal medicines? no   Are you having any side effects from any of your medications? -  no  Have you stopped taking any of your medications? Is so, why? -  no    Have you seen any other physician or provider since your last visit? No  Have you had any other diagnostic tests since your last visit? No  Have you been seen in the emergency room and/or had an admission to a hospital since we last saw you? No  Have you had your annual diabetic retinal (eye) exam? No  Have you had your routine dental cleaning in the past 6 months? no    Have you activated your [employer] account? If not, what are your barriers?  Yes     Patient Care Team:  Elmyra Scheuermann, MD as PCP - General (Family Medicine)  Elmyra Scheuermann, MD as PCP - [facility]  Clarisse Rizzo DO as Consulting Physician (Pulmonology)  Carolyn Oviedo MD as Consulting Physician (Urology)  Rekha Jimenez MD as Consulting Physician (Pulmonology)         Medical History Review  Past Medical, Family, and Social History reviewed and does not contribute to the patient presenting condition    Health Maintenance   Topic Date Due    DTaP/Tdap/Td vaccine (1 - Tdap) 07/17/2019 (Originally [birth year]/6/1977)    Shingles Vaccine (1 of 2) 06/26/2020 (Originally 12/6/2008)    Potassium monitoring  04/01/2020    Creatinine monitoring  04/01/2020    Lipid screen  04/01/2024    Colon cancer screen colonoscopy  07/24/2025    Flu vaccine  Completed    Hepatitis C screen  Completed    HIV screen  Completed    Pneumococcal 0-64 years Vaccine  Aged Out

## 2019-07-02 DIAGNOSIS — I10 ESSENTIAL HYPERTENSION: ICD-10-CM

## 2019-07-02 RX ORDER — AMLODIPINE BESYLATE 10 MG/1
TABLET ORAL
Qty: 90 TABLET | Refills: 1 | Status: SHIPPED | OUTPATIENT
Start: 2019-07-02 | End: 2019-12-23

## 2019-08-05 ENCOUNTER — OFFICE VISIT (OUTPATIENT)
Dept: PULMONOLOGY | Age: 59
End: 2019-08-05
Payer: COMMERCIAL

## 2019-08-05 VITALS
RESPIRATION RATE: 12 BRPM | HEIGHT: 72 IN | BODY MASS INDEX: 37.9 KG/M2 | OXYGEN SATURATION: 98 % | SYSTOLIC BLOOD PRESSURE: 122 MMHG | HEART RATE: 74 BPM | DIASTOLIC BLOOD PRESSURE: 74 MMHG | WEIGHT: 279.8 LBS

## 2019-08-05 DIAGNOSIS — E66.01 CLASS 2 SEVERE OBESITY DUE TO EXCESS CALORIES WITH SERIOUS COMORBIDITY IN ADULT, UNSPECIFIED BMI (HCC): ICD-10-CM

## 2019-08-05 DIAGNOSIS — N52.01 ERECTILE DYSFUNCTION DUE TO ARTERIAL INSUFFICIENCY: ICD-10-CM

## 2019-08-05 DIAGNOSIS — I10 ESSENTIAL HYPERTENSION: ICD-10-CM

## 2019-08-05 DIAGNOSIS — N40.0 BENIGN PROSTATIC HYPERPLASIA WITHOUT LOWER URINARY TRACT SYMPTOMS: ICD-10-CM

## 2019-08-05 DIAGNOSIS — Z99.89 OSA ON CPAP: Primary | ICD-10-CM

## 2019-08-05 DIAGNOSIS — G47.33 OSA ON CPAP: Primary | ICD-10-CM

## 2019-08-05 DIAGNOSIS — K21.9 GASTROESOPHAGEAL REFLUX DISEASE WITHOUT ESOPHAGITIS: ICD-10-CM

## 2019-08-05 PROBLEM — D36.7 BENIGN NEOPLASM OF LOWER EXTREMITY: Status: ACTIVE | Noted: 2017-06-16

## 2019-08-05 PROCEDURE — 99213 OFFICE O/P EST LOW 20 MIN: CPT | Performed by: INTERNAL MEDICINE

## 2019-08-05 PROCEDURE — G8427 DOCREV CUR MEDS BY ELIG CLIN: HCPCS | Performed by: INTERNAL MEDICINE

## 2019-08-05 PROCEDURE — 3017F COLORECTAL CA SCREEN DOC REV: CPT | Performed by: INTERNAL MEDICINE

## 2019-08-05 PROCEDURE — 1036F TOBACCO NON-USER: CPT | Performed by: INTERNAL MEDICINE

## 2019-08-05 PROCEDURE — G8417 CALC BMI ABV UP PARAM F/U: HCPCS | Performed by: INTERNAL MEDICINE

## 2019-08-05 ASSESSMENT — SLEEP AND FATIGUE QUESTIONNAIRES
HOW LIKELY ARE YOU TO NOD OFF OR FALL ASLEEP WHILE WATCHING TV: 0
HOW LIKELY ARE YOU TO NOD OFF OR FALL ASLEEP WHILE LYING DOWN TO REST IN THE AFTERNOON WHEN CIRCUMSTANCES PERMIT: 2
HOW LIKELY ARE YOU TO NOD OFF OR FALL ASLEEP WHILE SITTING AND READING: 0
HOW LIKELY ARE YOU TO NOD OFF OR FALL ASLEEP WHILE SITTING AND TALKING TO SOMEONE: 0
HOW LIKELY ARE YOU TO NOD OFF OR FALL ASLEEP IN A CAR, WHILE STOPPED FOR A FEW MINUTES IN TRAFFIC: 0
ESS TOTAL SCORE: 3
HOW LIKELY ARE YOU TO NOD OFF OR FALL ASLEEP WHEN YOU ARE A PASSENGER IN A CAR FOR AN HOUR WITHOUT A BREAK: 0
HOW LIKELY ARE YOU TO NOD OFF OR FALL ASLEEP WHILE SITTING INACTIVE IN A PUBLIC PLACE: 0
HOW LIKELY ARE YOU TO NOD OFF OR FALL ASLEEP WHILE SITTING QUIETLY AFTER LUNCH WITHOUT ALCOHOL: 1

## 2019-09-09 RX ORDER — VALSARTAN AND HYDROCHLOROTHIAZIDE 160; 12.5 MG/1; MG/1
TABLET, FILM COATED ORAL
Qty: 90 TABLET | Refills: 0 | Status: SHIPPED | OUTPATIENT
Start: 2019-09-09 | End: 2019-12-04 | Stop reason: SDUPTHER

## 2019-09-09 RX ORDER — PRAVASTATIN SODIUM 40 MG
TABLET ORAL
Qty: 90 TABLET | Refills: 0 | Status: SHIPPED | OUTPATIENT
Start: 2019-09-09 | End: 2019-12-04 | Stop reason: SDUPTHER

## 2019-09-30 DIAGNOSIS — E83.51 HYPOCALCEMIA: ICD-10-CM

## 2019-09-30 RX ORDER — POTASSIUM CHLORIDE 20 MEQ/1
TABLET, EXTENDED RELEASE ORAL
Qty: 90 TABLET | Refills: 0 | Status: SHIPPED | OUTPATIENT
Start: 2019-09-30 | End: 2020-01-16

## 2019-10-04 ENCOUNTER — OFFICE VISIT (OUTPATIENT)
Dept: INTERNAL MEDICINE CLINIC | Age: 59
End: 2019-10-04
Payer: COMMERCIAL

## 2019-10-04 VITALS
BODY MASS INDEX: 38.74 KG/M2 | WEIGHT: 286 LBS | HEIGHT: 72 IN | HEART RATE: 71 BPM | OXYGEN SATURATION: 98 % | SYSTOLIC BLOOD PRESSURE: 130 MMHG | RESPIRATION RATE: 18 BRPM | DIASTOLIC BLOOD PRESSURE: 90 MMHG

## 2019-10-04 DIAGNOSIS — I10 ESSENTIAL HYPERTENSION: ICD-10-CM

## 2019-10-04 DIAGNOSIS — N40.0 BENIGN PROSTATIC HYPERPLASIA WITHOUT LOWER URINARY TRACT SYMPTOMS: ICD-10-CM

## 2019-10-04 DIAGNOSIS — K21.9 GASTROESOPHAGEAL REFLUX DISEASE WITHOUT ESOPHAGITIS: ICD-10-CM

## 2019-10-04 DIAGNOSIS — M67.919 TENDINOPATHY OF ROTATOR CUFF, UNSPECIFIED LATERALITY: ICD-10-CM

## 2019-10-04 DIAGNOSIS — B30.9 ACUTE VIRAL CONJUNCTIVITIS OF LEFT EYE: Primary | ICD-10-CM

## 2019-10-04 DIAGNOSIS — N52.01 ERECTILE DYSFUNCTION DUE TO ARTERIAL INSUFFICIENCY: ICD-10-CM

## 2019-10-04 DIAGNOSIS — E78.2 MIXED HYPERLIPIDEMIA: ICD-10-CM

## 2019-10-04 DIAGNOSIS — E66.9 OBESITY (BMI 35.0-39.9 WITHOUT COMORBIDITY): ICD-10-CM

## 2019-10-04 DIAGNOSIS — G47.33 OSA ON CPAP: ICD-10-CM

## 2019-10-04 DIAGNOSIS — N18.30 STAGE 3 CHRONIC KIDNEY DISEASE (HCC): ICD-10-CM

## 2019-10-04 DIAGNOSIS — F45.8 ANXIETY HYPERVENTILATION: ICD-10-CM

## 2019-10-04 DIAGNOSIS — Z99.89 OSA ON CPAP: ICD-10-CM

## 2019-10-04 DIAGNOSIS — F41.9 ANXIETY HYPERVENTILATION: ICD-10-CM

## 2019-10-04 PROBLEM — R00.2 PALPITATION: Status: RESOLVED | Noted: 2019-06-26 | Resolved: 2019-10-04

## 2019-10-04 PROCEDURE — 90688 IIV4 VACCINE SPLT 0.5 ML IM: CPT | Performed by: FAMILY MEDICINE

## 2019-10-04 PROCEDURE — G8417 CALC BMI ABV UP PARAM F/U: HCPCS | Performed by: FAMILY MEDICINE

## 2019-10-04 PROCEDURE — G8427 DOCREV CUR MEDS BY ELIG CLIN: HCPCS | Performed by: FAMILY MEDICINE

## 2019-10-04 PROCEDURE — G8484 FLU IMMUNIZE NO ADMIN: HCPCS | Performed by: FAMILY MEDICINE

## 2019-10-04 PROCEDURE — 3017F COLORECTAL CA SCREEN DOC REV: CPT | Performed by: FAMILY MEDICINE

## 2019-10-04 PROCEDURE — 1036F TOBACCO NON-USER: CPT | Performed by: FAMILY MEDICINE

## 2019-10-04 PROCEDURE — 99213 OFFICE O/P EST LOW 20 MIN: CPT | Performed by: FAMILY MEDICINE

## 2019-10-04 PROCEDURE — 90471 IMMUNIZATION ADMIN: CPT | Performed by: FAMILY MEDICINE

## 2019-10-04 RX ORDER — FEXOFENADINE HCL 180 MG/1
180 TABLET ORAL DAILY
Qty: 30 TABLET | Refills: 0 | Status: SHIPPED | OUTPATIENT
Start: 2019-10-04 | End: 2019-11-03

## 2019-10-04 RX ORDER — SULFACETAMIDE SODIUM 100 MG/ML
2 SOLUTION/ DROPS OPHTHALMIC 4 TIMES DAILY
Qty: 1 BOTTLE | Refills: 0 | Status: SHIPPED | OUTPATIENT
Start: 2019-10-04 | End: 2019-10-14

## 2019-10-04 ASSESSMENT — ENCOUNTER SYMPTOMS
RESPIRATORY NEGATIVE: 1
GASTROINTESTINAL NEGATIVE: 1
ALLERGIC/IMMUNOLOGIC NEGATIVE: 1
EYE REDNESS: 1

## 2019-12-05 RX ORDER — PRAVASTATIN SODIUM 40 MG
TABLET ORAL
Qty: 90 TABLET | Refills: 0 | Status: SHIPPED | OUTPATIENT
Start: 2019-12-05 | End: 2020-03-02

## 2019-12-05 RX ORDER — VALSARTAN AND HYDROCHLOROTHIAZIDE 160; 12.5 MG/1; MG/1
TABLET, FILM COATED ORAL
Qty: 90 TABLET | Refills: 0 | Status: SHIPPED | OUTPATIENT
Start: 2019-12-05 | End: 2020-03-02

## 2019-12-19 ENCOUNTER — OFFICE VISIT (OUTPATIENT)
Dept: INTERNAL MEDICINE CLINIC | Age: 59
End: 2019-12-19
Payer: COMMERCIAL

## 2019-12-19 VITALS
HEIGHT: 72 IN | TEMPERATURE: 96.6 F | DIASTOLIC BLOOD PRESSURE: 80 MMHG | SYSTOLIC BLOOD PRESSURE: 130 MMHG | RESPIRATION RATE: 18 BRPM | HEART RATE: 70 BPM | WEIGHT: 276.6 LBS | OXYGEN SATURATION: 99 % | BODY MASS INDEX: 37.46 KG/M2

## 2019-12-19 DIAGNOSIS — K21.9 GASTROESOPHAGEAL REFLUX DISEASE WITHOUT ESOPHAGITIS: ICD-10-CM

## 2019-12-19 DIAGNOSIS — F41.9 ANXIETY HYPERVENTILATION: ICD-10-CM

## 2019-12-19 DIAGNOSIS — D36.7 BENIGN NEOPLASM OF LOWER EXTREMITY: ICD-10-CM

## 2019-12-19 DIAGNOSIS — E66.9 OBESITY (BMI 35.0-39.9 WITHOUT COMORBIDITY): ICD-10-CM

## 2019-12-19 DIAGNOSIS — N40.0 BENIGN PROSTATIC HYPERPLASIA WITHOUT LOWER URINARY TRACT SYMPTOMS: ICD-10-CM

## 2019-12-19 DIAGNOSIS — Z99.89 OSA ON CPAP: ICD-10-CM

## 2019-12-19 DIAGNOSIS — N52.01 ERECTILE DYSFUNCTION DUE TO ARTERIAL INSUFFICIENCY: ICD-10-CM

## 2019-12-19 DIAGNOSIS — I10 ESSENTIAL HYPERTENSION: Primary | ICD-10-CM

## 2019-12-19 DIAGNOSIS — M67.919 TENDINOPATHY OF ROTATOR CUFF, UNSPECIFIED LATERALITY: ICD-10-CM

## 2019-12-19 DIAGNOSIS — E78.2 MIXED HYPERLIPIDEMIA: ICD-10-CM

## 2019-12-19 DIAGNOSIS — G47.33 OSA ON CPAP: ICD-10-CM

## 2019-12-19 DIAGNOSIS — F45.8 ANXIETY HYPERVENTILATION: ICD-10-CM

## 2019-12-19 DIAGNOSIS — N18.30 STAGE 3 CHRONIC KIDNEY DISEASE (HCC): ICD-10-CM

## 2019-12-19 PROBLEM — B30.9 ACUTE VIRAL CONJUNCTIVITIS OF LEFT EYE: Status: RESOLVED | Noted: 2019-10-04 | Resolved: 2019-12-19

## 2019-12-19 PROCEDURE — 99214 OFFICE O/P EST MOD 30 MIN: CPT | Performed by: FAMILY MEDICINE

## 2019-12-19 PROCEDURE — G8417 CALC BMI ABV UP PARAM F/U: HCPCS | Performed by: FAMILY MEDICINE

## 2019-12-19 PROCEDURE — 90471 IMMUNIZATION ADMIN: CPT | Performed by: FAMILY MEDICINE

## 2019-12-19 PROCEDURE — 1036F TOBACCO NON-USER: CPT | Performed by: FAMILY MEDICINE

## 2019-12-19 PROCEDURE — 90732 PPSV23 VACC 2 YRS+ SUBQ/IM: CPT | Performed by: FAMILY MEDICINE

## 2019-12-19 PROCEDURE — G8427 DOCREV CUR MEDS BY ELIG CLIN: HCPCS | Performed by: FAMILY MEDICINE

## 2019-12-19 PROCEDURE — G8482 FLU IMMUNIZE ORDER/ADMIN: HCPCS | Performed by: FAMILY MEDICINE

## 2019-12-19 PROCEDURE — 3017F COLORECTAL CA SCREEN DOC REV: CPT | Performed by: FAMILY MEDICINE

## 2019-12-19 ASSESSMENT — ENCOUNTER SYMPTOMS
GASTROINTESTINAL NEGATIVE: 1
EYES NEGATIVE: 1
ALLERGIC/IMMUNOLOGIC NEGATIVE: 1
RESPIRATORY NEGATIVE: 1

## 2019-12-23 DIAGNOSIS — I10 ESSENTIAL HYPERTENSION: ICD-10-CM

## 2019-12-23 RX ORDER — AMLODIPINE BESYLATE 10 MG/1
TABLET ORAL
Qty: 90 TABLET | Refills: 1 | Status: SHIPPED | OUTPATIENT
Start: 2019-12-23 | End: 2020-06-29

## 2020-01-16 RX ORDER — POTASSIUM CHLORIDE 20 MEQ/1
TABLET, EXTENDED RELEASE ORAL
Qty: 90 TABLET | Refills: 0 | Status: SHIPPED | OUTPATIENT
Start: 2020-01-16 | End: 2020-04-22

## 2020-02-03 ENCOUNTER — OFFICE VISIT (OUTPATIENT)
Dept: PULMONOLOGY | Age: 60
End: 2020-02-03
Payer: COMMERCIAL

## 2020-02-03 VITALS
BODY MASS INDEX: 38.19 KG/M2 | HEIGHT: 72 IN | SYSTOLIC BLOOD PRESSURE: 124 MMHG | DIASTOLIC BLOOD PRESSURE: 74 MMHG | HEART RATE: 79 BPM | RESPIRATION RATE: 14 BRPM | WEIGHT: 282 LBS | OXYGEN SATURATION: 99 %

## 2020-02-03 PROCEDURE — 1036F TOBACCO NON-USER: CPT | Performed by: INTERNAL MEDICINE

## 2020-02-03 PROCEDURE — G8417 CALC BMI ABV UP PARAM F/U: HCPCS | Performed by: INTERNAL MEDICINE

## 2020-02-03 PROCEDURE — G8482 FLU IMMUNIZE ORDER/ADMIN: HCPCS | Performed by: INTERNAL MEDICINE

## 2020-02-03 PROCEDURE — 3017F COLORECTAL CA SCREEN DOC REV: CPT | Performed by: INTERNAL MEDICINE

## 2020-02-03 PROCEDURE — 99214 OFFICE O/P EST MOD 30 MIN: CPT | Performed by: INTERNAL MEDICINE

## 2020-02-03 PROCEDURE — G8427 DOCREV CUR MEDS BY ELIG CLIN: HCPCS | Performed by: INTERNAL MEDICINE

## 2020-02-03 ASSESSMENT — SLEEP AND FATIGUE QUESTIONNAIRES
HOW LIKELY ARE YOU TO NOD OFF OR FALL ASLEEP WHILE SITTING AND READING: 0
HOW LIKELY ARE YOU TO NOD OFF OR FALL ASLEEP IN A CAR, WHILE STOPPED FOR A FEW MINUTES IN TRAFFIC: 0
HOW LIKELY ARE YOU TO NOD OFF OR FALL ASLEEP WHILE SITTING QUIETLY AFTER LUNCH WITHOUT ALCOHOL: 1
ESS TOTAL SCORE: 3
HOW LIKELY ARE YOU TO NOD OFF OR FALL ASLEEP WHILE WATCHING TV: 2
HOW LIKELY ARE YOU TO NOD OFF OR FALL ASLEEP WHILE SITTING AND TALKING TO SOMEONE: 0
HOW LIKELY ARE YOU TO NOD OFF OR FALL ASLEEP WHEN YOU ARE A PASSENGER IN A CAR FOR AN HOUR WITHOUT A BREAK: 0
HOW LIKELY ARE YOU TO NOD OFF OR FALL ASLEEP WHILE LYING DOWN TO REST IN THE AFTERNOON WHEN CIRCUMSTANCES PERMIT: 0
HOW LIKELY ARE YOU TO NOD OFF OR FALL ASLEEP WHILE SITTING INACTIVE IN A PUBLIC PLACE: 0

## 2020-02-04 ENCOUNTER — TELEPHONE (OUTPATIENT)
Dept: PULMONOLOGY | Age: 60
End: 2020-02-04

## 2020-02-04 NOTE — TELEPHONE ENCOUNTER
I faxed order and office not to [facility] [place] [phone].

## 2020-03-02 RX ORDER — VALSARTAN AND HYDROCHLOROTHIAZIDE 160; 12.5 MG/1; MG/1
TABLET, FILM COATED ORAL
Qty: 30 TABLET | Refills: 0 | Status: SHIPPED | OUTPATIENT
Start: 2020-03-02 | End: 2020-04-03

## 2020-03-02 RX ORDER — PRAVASTATIN SODIUM 40 MG
TABLET ORAL
Qty: 30 TABLET | Refills: 0 | Status: SHIPPED | OUTPATIENT
Start: 2020-03-02 | End: 2020-04-02

## 2020-04-02 RX ORDER — PRAVASTATIN SODIUM 40 MG
TABLET ORAL
Qty: 60 TABLET | Refills: 0 | Status: SHIPPED | OUTPATIENT
Start: 2020-04-02 | End: 2020-06-02

## 2020-04-03 RX ORDER — VALSARTAN AND HYDROCHLOROTHIAZIDE 160; 12.5 MG/1; MG/1
TABLET, FILM COATED ORAL
Qty: 90 TABLET | Refills: 0 | Status: SHIPPED | OUTPATIENT
Start: 2020-04-03 | End: 2020-06-03

## 2020-04-10 ENCOUNTER — VIRTUAL VISIT (OUTPATIENT)
Dept: INTERNAL MEDICINE CLINIC | Age: 60
End: 2020-04-10
Payer: COMMERCIAL

## 2020-04-10 PROBLEM — J30.2 SEASONAL ALLERGIC RHINITIS: Status: ACTIVE | Noted: 2020-04-10

## 2020-04-10 PROCEDURE — 99423 OL DIG E/M SVC 21+ MIN: CPT | Performed by: FAMILY MEDICINE

## 2020-04-10 RX ORDER — AZELASTINE 1 MG/ML
1 SPRAY, METERED NASAL 2 TIMES DAILY
Qty: 2 BOTTLE | Refills: 1 | Status: SHIPPED | OUTPATIENT
Start: 2020-04-10 | End: 2020-07-17 | Stop reason: SDUPTHER

## 2020-04-10 ASSESSMENT — PATIENT HEALTH QUESTIONNAIRE - PHQ9
SUM OF ALL RESPONSES TO PHQ QUESTIONS 1-9: 0
1. LITTLE INTEREST OR PLEASURE IN DOING THINGS: 0
2. FEELING DOWN, DEPRESSED OR HOPELESS: 0
SUM OF ALL RESPONSES TO PHQ QUESTIONS 1-9: 0
SUM OF ALL RESPONSES TO PHQ9 QUESTIONS 1 & 2: 0

## 2020-04-10 ASSESSMENT — ENCOUNTER SYMPTOMS
EYES NEGATIVE: 1
GASTROINTESTINAL NEGATIVE: 1
SINUS COMPLAINT: 1
ALLERGIC/IMMUNOLOGIC NEGATIVE: 1
RESPIRATORY NEGATIVE: 1

## 2020-04-10 NOTE — PROGRESS NOTES
motion and neck supple. Cardiovascular:      Rate and Rhythm: Normal rate and regular rhythm. Heart sounds: Normal heart sounds. Pulmonary:      Effort: Pulmonary effort is normal.      Breath sounds: Normal breath sounds. Abdominal:      General: Bowel sounds are normal.      Palpations: Abdomen is soft. Musculoskeletal: Normal range of motion. Comments: Muscular pain   Skin:     General: Skin is warm and dry. Neurological:      Mental Status: He is alert and oriented to person, place, and time. Deep Tendon Reflexes: Reflexes are normal and symmetric. Psychiatric:         Behavior: Behavior normal.         Thought Content: Thought content normal.         Assessment:       Diagnosis Orders   1. Essential hypertension     2. YUMIKO on CPAP     3. Gastroesophageal reflux disease without esophagitis     4. Tendinopathy of rotator cuff, unspecified laterality     5. Benign prostatic hyperplasia without lower urinary tract symptoms     6. Stage 3 chronic kidney disease ([address]     7. Erectile dysfunction due to arterial insufficiency     8. Mixed hyperlipidemia     9. Obesity (BMI 35.0-39.9 without comorbidity)     10. Anxiety hyperventilation     11. Seasonal allergic rhinitis due to pollen             Plan:      59-year-old -American male underwent telephone conference. He is complaining of sinus-like symptoms with subjective complaint of headache without fever chills nausea vomiting abdominal pain or urogenital symptoms. He denies coming in contact with any serious illness or travel outside. He feels that he get those symptoms when his CPAP filter gets clogged. At this time we will proceed with treating this patient for presumptive allergic rhinitis with nasal saline, over-the-counter Tylenol, cough drops and improved oral hydration. He is advised that if he continues to have symptoms, he would need to go to flu clinic to rule out covid 19 he voices clear understanding.   History of

## 2020-04-22 RX ORDER — POTASSIUM CHLORIDE 20 MEQ/1
TABLET, EXTENDED RELEASE ORAL
Qty: 90 TABLET | Refills: 0 | Status: SHIPPED | OUTPATIENT
Start: 2020-04-22 | End: 2020-06-24 | Stop reason: SDUPTHER

## 2020-04-22 NOTE — TELEPHONE ENCOUNTER
Last visit: 4/10/2020  Last Med refill: 1/6/2020  Does patient have enough medication for 72 hours: NA    Next Visit Date:  Future Appointments   Date Time Provider Mark Judy   6/24/2020  9:30 AM MD Becka MonaeRUST [facility]   8/10/2020  9:45 AM Amber Braun  [address] Maintenance   Topic Date Due    Lipid screen  04/01/2020    Shingles Vaccine (1 of 2) 06/26/2020 (Originally 12/6/2008)    Potassium monitoring  04/10/2021 (Originally 4/1/2020)    Creatinine monitoring  04/10/2021 (Originally 4/1/2020)    Colon cancer screen colonoscopy  07/24/2025    DTaP/Tdap/Td vaccine (2 - Td) 06/26/2029    Flu vaccine  Completed    Hepatitis C screen  Completed    HIV screen  Completed    Hepatitis A vaccine  Aged Out    Hepatitis B vaccine  Aged Out    Hib vaccine  Aged Out    Meningococcal (ACWY) vaccine  Aged Out    Pneumococcal 0-64 years Vaccine  Aged Out       Hemoglobin A1C (%)   Date Value   06/15/2018 5.3   06/21/2017 5.6   12/21/2016 5.3             ( goal A1C is < 7)   No results found for: LABMICR  LDL Cholesterol (mg/dL)   Date Value   04/01/2019 77   06/21/2017 66       (goal LDL is <100)   AST (U/L)   Date Value   06/15/2018 17     ALT (U/L)   Date Value   06/15/2018 14     BUN (mg/dL)   Date Value   04/01/2019 13     BP Readings from Last 3 Encounters:   02/03/20 124/74   12/19/19 130/80   10/04/19 (!) 130/90          (goal 120/80)    All Future Testing planned in [facility]              Patient Active Problem List:     BPH (benign prostatic hyperplasia)     Erectile dysfunction     GERD (gastroesophageal reflux disease)     Hypertension     Hyperlipidemia     Tendinopathy of rotator cuff     YUMIKO on CPAP     Stage 3 chronic kidney disease (HCC)     Obesity (BMI 35.0-39.9 without comorbidity)     Anxiety hyperventilation     Benign neoplasm of lower extremity     Seasonal allergic rhinitis

## 2020-05-22 ENCOUNTER — VIRTUAL VISIT (OUTPATIENT)
Dept: INTERNAL MEDICINE CLINIC | Age: 60
End: 2020-05-22
Payer: COMMERCIAL

## 2020-05-22 PROCEDURE — 99214 OFFICE O/P EST MOD 30 MIN: CPT | Performed by: FAMILY MEDICINE

## 2020-05-22 RX ORDER — AMOXICILLIN 500 MG/1
500 CAPSULE ORAL 3 TIMES DAILY
Qty: 21 CAPSULE | Refills: 0 | Status: SHIPPED | OUTPATIENT
Start: 2020-05-22 | End: 2020-05-29

## 2020-05-22 RX ORDER — FLUTICASONE PROPIONATE 50 MCG
1 SPRAY, SUSPENSION (ML) NASAL DAILY
Qty: 2 BOTTLE | Refills: 1 | Status: SHIPPED | OUTPATIENT
Start: 2020-05-22 | End: 2020-07-17 | Stop reason: SDUPTHER

## 2020-05-22 RX ORDER — TAMSULOSIN HYDROCHLORIDE 0.4 MG/1
0.4 CAPSULE ORAL DAILY
COMMUNITY

## 2020-05-22 NOTE — PROGRESS NOTES
Subjective:    Tara Ochoa is a 62 y.o. male evaluated via telephone on 5/22/2020. Consent:  He and/or health care decision maker is aware that that he may receive a bill for this telephone service, depending on his insurance coverage, and has provided verbal consent to proceed: Yes      Documentation:  I communicated with the patient and/or health care decision maker about subjective complaint of sinus congestion, postnasal drainage and body aches. He denies any maxillary tenderness. History of chronic kidney disease, BPH, degenerative polyarthralgia, anxiety and depression, hyperlipidemia. Sleep apnea, GERD, hypertension  Details of this discussion including any medical advice provided: Reassurance provided. He is advised to continue Astelin. I have ordered Flonase and amoxicillin. He may take over-the-counter Tylenol as needed. Improve oral hydration  GERD stable on proton pump inhibitor  History of degenerative polyarthropathy baseline stable. Wants to refrain from anti-inflammatories for muscle aches and pains  BPH stable on Flomax  Obesity with sleep apnea. He is compliant with CPAP. He would benefit from weight loss to keep BMI around 25. Low-fat high-fiber diet, daily moderate exercise and lifestyle change  Hypertension well-controlled to Norvasc and Diovan HCT. Consume less than 2 g of salt a day  Hyperlipidemia on statin that he is tolerating well  Depression screen is negative  He denies tobacco, excessive alcohol or illicit drug use  Further recommendations to follow  He is encouraged to call for any concern        I affirm this is a Patient Initiated Episode with a Patient who has not had a related appointment within my department in the past 7 days or scheduled within the next 24 hours.     Patient identification was verified at the start of the visit: Yes    Total Time: minutes: 21-30 minutes    Note: not billable if this call serves to triage the patient into an appointment for the relevant concern  This note is created with a voice recognition program and while intend to generate a document that accurately reflects the content of the visit, no guarantee can be provided that every mistake has been identified and corrected by editing.       Lisy Andrade MD

## 2020-06-02 NOTE — TELEPHONE ENCOUNTER
Last visit: 5-22-20  Last Med refill:   Does patient have enough medication for 72 hours: No:     Next Visit Date:  Future Appointments   Date Time Provider Mark Judy   6/24/2020  9:30 AM MD Rosy Zapien  [facility]   8/10/2020  9:45 AM Jessee Velazquez  [address] Maintenance   Topic Date Due    Lipid screen  04/01/2020    Shingles Vaccine (1 of 2) 06/26/2020 (Originally 12/6/2008)    Potassium monitoring  04/10/2021 (Originally 4/1/2020)    Creatinine monitoring  04/10/2021 (Originally 4/1/2020)    Colon cancer screen colonoscopy  07/24/2025    DTaP/Tdap/Td vaccine (2 - Td) 06/26/2029    Flu vaccine  Completed    Hepatitis C screen  Completed    HIV screen  Completed    Hepatitis A vaccine  Aged Out    Hepatitis B vaccine  Aged Out    Hib vaccine  Aged Out    Meningococcal (ACWY) vaccine  Aged Out    Pneumococcal 0-64 years Vaccine  Aged Out       Hemoglobin A1C (%)   Date Value   06/15/2018 5.3   06/21/2017 5.6   12/21/2016 5.3             ( goal A1C is < 7)   No results found for: LABMICR  LDL Cholesterol (mg/dL)   Date Value   04/01/2019 77   06/21/2017 66       (goal LDL is <100)   AST (U/L)   Date Value   06/15/2018 17     ALT (U/L)   Date Value   06/15/2018 14     BUN (mg/dL)   Date Value   04/01/2019 13     BP Readings from Last 3 Encounters:   02/03/20 124/74   12/19/19 130/80   10/04/19 (!) 130/90          (goal 120/80)    All Future Testing planned in [facility]              Patient Active Problem List:     BPH (benign prostatic hyperplasia)     Erectile dysfunction     GERD (gastroesophageal reflux disease)     Hypertension     Hyperlipidemia     Tendinopathy of rotator cuff     YUMIKO on CPAP     Stage 3 chronic kidney disease (HCC)     Obesity (BMI 35.0-39.9 without comorbidity)     Anxiety hyperventilation     Benign neoplasm of lower extremity     Seasonal allergic rhinitis

## 2020-06-03 RX ORDER — VALSARTAN AND HYDROCHLOROTHIAZIDE 160; 12.5 MG/1; MG/1
TABLET, FILM COATED ORAL
Qty: 90 TABLET | Refills: 0 | Status: SHIPPED | OUTPATIENT
Start: 2020-06-03 | End: 2020-06-29

## 2020-06-03 RX ORDER — PRAVASTATIN SODIUM 40 MG
TABLET ORAL
Qty: 90 TABLET | Refills: 0 | Status: SHIPPED | OUTPATIENT
Start: 2020-06-03 | End: 2020-06-29

## 2020-06-24 ENCOUNTER — OFFICE VISIT (OUTPATIENT)
Dept: INTERNAL MEDICINE CLINIC | Age: 60
End: 2020-06-24
Payer: COMMERCIAL

## 2020-06-24 ENCOUNTER — HOSPITAL ENCOUNTER (OUTPATIENT)
Age: 60
Setting detail: SPECIMEN
Discharge: HOME | End: 2020-06-24
Payer: COMMERCIAL

## 2020-06-24 VITALS
OXYGEN SATURATION: 100 % | RESPIRATION RATE: 18 BRPM | HEIGHT: 72 IN | HEART RATE: 67 BPM | BODY MASS INDEX: 36.92 KG/M2 | SYSTOLIC BLOOD PRESSURE: 130 MMHG | WEIGHT: 272.6 LBS | TEMPERATURE: 98.2 F | DIASTOLIC BLOOD PRESSURE: 80 MMHG

## 2020-06-24 LAB
-: NORMAL
ALBUMIN SERPL-MCNC: 4.5 G/DL (ref 3.5–5.2)
ALBUMIN/GLOBULIN RATIO: 1.4 (ref 1–2.5)
ALP BLD-CCNC: 102 U/L (ref 40–129)
ALT SERPL-CCNC: 11 U/L (ref 5–41)
AMORPHOUS: NORMAL
ANION GAP SERPL CALCULATED.3IONS-SCNC: 15 MMOL/L (ref 9–17)
AST SERPL-CCNC: 15 U/L
BACTERIA: NORMAL
BILIRUB SERPL-MCNC: 1.31 MG/DL (ref 0.3–1.2)
BILIRUBIN URINE: NEGATIVE
BUN BLDV-MCNC: 9 MG/DL (ref 8–23)
BUN/CREAT BLD: ABNORMAL (ref 9–20)
CALCIUM SERPL-MCNC: 9.4 MG/DL (ref 8.6–10.4)
CASTS UA: NORMAL /LPF (ref 0–8)
CHLORIDE BLD-SCNC: 105 MMOL/L (ref 98–107)
CHOLESTEROL/HDL RATIO: 2.7
CHOLESTEROL: 124 MG/DL
CO2: 24 MMOL/L (ref 20–31)
COLOR: YELLOW
CREAT SERPL-MCNC: 0.99 MG/DL (ref 0.7–1.2)
CRYSTALS, UA: NORMAL /HPF
EPITHELIAL CELLS UA: NORMAL /HPF (ref 0–5)
ESTIMATED AVERAGE GLUCOSE: 114 MG/DL
GFR AFRICAN AMERICAN: >60 ML/MIN
GFR NON-AFRICAN AMERICAN: >60 ML/MIN
GFR SERPL CREATININE-BSD FRML MDRD: ABNORMAL ML/MIN/{1.73_M2}
GFR SERPL CREATININE-BSD FRML MDRD: ABNORMAL ML/MIN/{1.73_M2}
GLUCOSE BLD-MCNC: 97 MG/DL (ref 70–99)
GLUCOSE URINE: NEGATIVE
HBA1C MFR BLD: 5.6 % (ref 4–6)
HCT VFR BLD CALC: 43.5 % (ref 40.7–50.3)
HDLC SERPL-MCNC: 46 MG/DL
HEMOGLOBIN: 14.1 G/DL (ref 13–17)
KETONES, URINE: NEGATIVE
LDL CHOLESTEROL: 64 MG/DL (ref 0–130)
LEUKOCYTE ESTERASE, URINE: NEGATIVE
MCH RBC QN AUTO: 27.9 PG (ref 25.2–33.5)
MCHC RBC AUTO-ENTMCNC: 32.4 G/DL (ref 28.4–34.8)
MCV RBC AUTO: 86.1 FL (ref 82.6–102.9)
MUCUS: NORMAL
NITRITE, URINE: NEGATIVE
NRBC AUTOMATED: 0 PER 100 WBC
OTHER OBSERVATIONS UA: NORMAL
PDW BLD-RTO: 14.1 % (ref 11.8–14.4)
PH UA: 7.5 (ref 5–8)
PLATELET # BLD: 244 K/UL (ref 138–453)
PMV BLD AUTO: 10.2 FL (ref 8.1–13.5)
POTASSIUM SERPL-SCNC: 3.5 MMOL/L (ref 3.7–5.3)
PROSTATE SPECIFIC ANTIGEN: 1.63 UG/L
PROTEIN UA: NEGATIVE
RBC # BLD: 5.05 M/UL (ref 4.21–5.77)
RBC UA: NORMAL /HPF (ref 0–4)
RENAL EPITHELIAL, UA: NORMAL /HPF
SODIUM BLD-SCNC: 144 MMOL/L (ref 135–144)
SPECIFIC GRAVITY UA: 1 (ref 1–1.03)
TOTAL PROTEIN: 7.8 G/DL (ref 6.4–8.3)
TRICHOMONAS: NORMAL
TRIGL SERPL-MCNC: 69 MG/DL
TSH SERPL DL<=0.05 MIU/L-ACNC: 2.42 MIU/L (ref 0.3–5)
TURBIDITY: CLEAR
URINE HGB: NEGATIVE
UROBILINOGEN, URINE: NORMAL
VLDLC SERPL CALC-MCNC: NORMAL MG/DL (ref 1–30)
WBC # BLD: 4.1 K/UL (ref 3.5–11.3)
WBC UA: NORMAL /HPF (ref 0–5)
YEAST: NORMAL

## 2020-06-24 PROCEDURE — 3017F COLORECTAL CA SCREEN DOC REV: CPT | Performed by: FAMILY MEDICINE

## 2020-06-24 PROCEDURE — 1036F TOBACCO NON-USER: CPT | Performed by: FAMILY MEDICINE

## 2020-06-24 PROCEDURE — 99214 OFFICE O/P EST MOD 30 MIN: CPT | Performed by: FAMILY MEDICINE

## 2020-06-24 PROCEDURE — G8427 DOCREV CUR MEDS BY ELIG CLIN: HCPCS | Performed by: FAMILY MEDICINE

## 2020-06-24 PROCEDURE — G8417 CALC BMI ABV UP PARAM F/U: HCPCS | Performed by: FAMILY MEDICINE

## 2020-06-24 RX ORDER — POTASSIUM CHLORIDE 20 MEQ/1
TABLET, EXTENDED RELEASE ORAL
Qty: 90 TABLET | Refills: 0 | Status: SHIPPED | OUTPATIENT
Start: 2020-06-24 | End: 2020-08-12 | Stop reason: DRUGHIGH

## 2020-06-24 ASSESSMENT — ENCOUNTER SYMPTOMS
GASTROINTESTINAL NEGATIVE: 1
EYES NEGATIVE: 1
RESPIRATORY NEGATIVE: 1
ALLERGIC/IMMUNOLOGIC NEGATIVE: 1

## 2020-06-24 ASSESSMENT — PATIENT HEALTH QUESTIONNAIRE - PHQ9
SUM OF ALL RESPONSES TO PHQ QUESTIONS 1-9: 0
SUM OF ALL RESPONSES TO PHQ QUESTIONS 1-9: 0
1. LITTLE INTEREST OR PLEASURE IN DOING THINGS: 0

## 2020-06-29 RX ORDER — VALSARTAN AND HYDROCHLOROTHIAZIDE 160; 12.5 MG/1; MG/1
TABLET, FILM COATED ORAL
Qty: 90 TABLET | Refills: 0 | Status: SHIPPED | OUTPATIENT
Start: 2020-06-29 | End: 2020-09-28

## 2020-06-29 RX ORDER — AMLODIPINE BESYLATE 10 MG/1
TABLET ORAL
Qty: 90 TABLET | Refills: 1 | Status: SHIPPED | OUTPATIENT
Start: 2020-06-29 | End: 2020-12-17

## 2020-06-29 RX ORDER — PRAVASTATIN SODIUM 40 MG
TABLET ORAL
Qty: 90 TABLET | Refills: 0 | Status: SHIPPED | OUTPATIENT
Start: 2020-06-29 | End: 2020-09-28

## 2020-07-17 ENCOUNTER — TELEPHONE (OUTPATIENT)
Dept: INTERNAL MEDICINE CLINIC | Age: 60
End: 2020-07-17

## 2020-07-17 RX ORDER — AZELASTINE 1 MG/ML
1 SPRAY, METERED NASAL 2 TIMES DAILY
Qty: 2 BOTTLE | Refills: 1 | Status: SHIPPED | OUTPATIENT
Start: 2020-07-17

## 2020-07-17 RX ORDER — FLUTICASONE PROPIONATE 50 MCG
1 SPRAY, SUSPENSION (ML) NASAL DAILY
Qty: 2 BOTTLE | Refills: 1 | Status: SHIPPED | OUTPATIENT
Start: 2020-07-17

## 2020-07-17 NOTE — TELEPHONE ENCOUNTER
Last visit: 6/24/20  /Next Visit Date: 10/26/20  Future Appointments   Date Time Provider Mark Kim   8/10/2020  9:45 AM Amara Alves MD Resp Spec [facility]   10/26/2020  8:30 AM Augustin Marina MD [facility] [address] Maintenance   Topic Date Due    Shingles Vaccine (1 of 2) 12/06/2008    Flu vaccine (1) 09/01/2020    Lipid screen  06/24/2021    Potassium monitoring  06/24/2021    Creatinine monitoring  06/24/2021    Colon cancer screen colonoscopy  07/24/2025    DTaP/Tdap/Td vaccine (2 - Td) 06/26/2029    Hepatitis C screen  Completed    HIV screen  Completed    Hepatitis A vaccine  Aged Out    Hepatitis B vaccine  Aged Out    Hib vaccine  Aged Out    Meningococcal (ACWY) vaccine  Aged Out    Pneumococcal 0-64 years Vaccine  Aged Out       Hemoglobin A1C (%)   Date Value   06/24/2020 5.6   06/15/2018 5.3   06/21/2017 5.6             ( goal A1C is < 7)   No results found for: LABMICR  LDL Cholesterol (mg/dL)   Date Value   06/24/2020 64   04/01/2019 77       (goal LDL is <100)   AST (U/L)   Date Value   06/24/2020 15     ALT (U/L)   Date Value   06/24/2020 11     BUN (mg/dL)   Date Value   06/24/2020 9     BP Readings from Last 3 Encounters:   06/24/20 130/80   02/03/20 124/74   12/19/19 130/80          (goal 120/80)    All Future Testing planned in [facility]              Patient Active Problem List:     BPH (benign prostatic hyperplasia)     Erectile dysfunction     GERD (gastroesophageal reflux disease)     Hypertension     Hyperlipidemia     Tendinopathy of rotator cuff     YUMIKO on CPAP     Stage 3 chronic kidney disease (HCC)     Obesity (BMI 35.0-39.9 without comorbidity)     Anxiety hyperventilation     Benign neoplasm of lower extremity     Seasonal allergic rhinitis

## 2020-07-17 NOTE — TELEPHONE ENCOUNTER
Spoke with patient, patient informed that rx was called in and to continue on medication physician has advised

## 2020-08-12 ENCOUNTER — OFFICE VISIT (OUTPATIENT)
Dept: INTERNAL MEDICINE CLINIC | Age: 60
End: 2020-08-12
Payer: COMMERCIAL

## 2020-08-12 VITALS
SYSTOLIC BLOOD PRESSURE: 160 MMHG | TEMPERATURE: 97.3 F | HEIGHT: 72 IN | OXYGEN SATURATION: 97 % | BODY MASS INDEX: 36.7 KG/M2 | HEART RATE: 76 BPM | WEIGHT: 271 LBS | DIASTOLIC BLOOD PRESSURE: 84 MMHG

## 2020-08-12 PROBLEM — R60.0 PEDAL EDEMA: Status: ACTIVE | Noted: 2020-08-12

## 2020-08-12 PROCEDURE — 3017F COLORECTAL CA SCREEN DOC REV: CPT | Performed by: FAMILY MEDICINE

## 2020-08-12 PROCEDURE — 1036F TOBACCO NON-USER: CPT | Performed by: FAMILY MEDICINE

## 2020-08-12 PROCEDURE — 99214 OFFICE O/P EST MOD 30 MIN: CPT | Performed by: FAMILY MEDICINE

## 2020-08-12 PROCEDURE — G8417 CALC BMI ABV UP PARAM F/U: HCPCS | Performed by: FAMILY MEDICINE

## 2020-08-12 PROCEDURE — G8427 DOCREV CUR MEDS BY ELIG CLIN: HCPCS | Performed by: FAMILY MEDICINE

## 2020-08-12 RX ORDER — LORATADINE 10 MG/1
10 TABLET ORAL DAILY
Qty: 90 TABLET | Refills: 1 | Status: SHIPPED | OUTPATIENT
Start: 2020-08-12 | End: 2021-06-21

## 2020-08-12 RX ORDER — SPIRONOLACTONE 50 MG/1
50 TABLET, FILM COATED ORAL DAILY
Qty: 90 TABLET | Refills: 1 | Status: SHIPPED | OUTPATIENT
Start: 2020-08-12 | End: 2020-11-02

## 2020-08-12 RX ORDER — POTASSIUM CHLORIDE 20 MEQ/1
TABLET, EXTENDED RELEASE ORAL
Qty: 90 TABLET | Refills: 0 | Status: SHIPPED | COMMUNITY
Start: 2020-08-12 | End: 2020-11-02

## 2020-08-12 ASSESSMENT — ENCOUNTER SYMPTOMS
EYES NEGATIVE: 1
ALLERGIC/IMMUNOLOGIC NEGATIVE: 1
BACK PAIN: 1
RESPIRATORY NEGATIVE: 1
GASTROINTESTINAL NEGATIVE: 1

## 2020-08-12 ASSESSMENT — PATIENT HEALTH QUESTIONNAIRE - PHQ9
SUM OF ALL RESPONSES TO PHQ9 QUESTIONS 1 & 2: 0
SUM OF ALL RESPONSES TO PHQ QUESTIONS 1-9: 0
SUM OF ALL RESPONSES TO PHQ QUESTIONS 1-9: 0
1. LITTLE INTEREST OR PLEASURE IN DOING THINGS: 0
2. FEELING DOWN, DEPRESSED OR HOPELESS: 0

## 2020-08-12 NOTE — PROGRESS NOTES
Subjective:      Patient ID: Georgina Guardado is a 62 y.o. male. Hypertension   This is a chronic problem. The current episode started more than 1 month ago. The problem has been gradually improving since onset. The problem is controlled. Associated symptoms include anxiety. Risk factors for coronary artery disease include sedentary lifestyle, stress, obesity, male gender, dyslipidemia and diabetes mellitus. Past treatments include angiotensin blockers and diuretics. The current treatment provides moderate improvement. Compliance problems include exercise. Hypertensive end-organ damage includes kidney disease and CAD/MI. Identifiable causes of hypertension include a hypertension causing med and sleep apnea. Review of Systems   Constitutional: Negative. HENT: Negative. Eyes: Negative. Respiratory: Negative. Cardiovascular: Negative. Gastrointestinal: Negative. Endocrine: Negative. Musculoskeletal: Positive for arthralgias and back pain. Skin: Negative. Allergic/Immunologic: Negative. Neurological: Negative. Hematological: Negative. Psychiatric/Behavioral: The patient is nervous/anxious. Past family and social history unremarkable. Diagnosis Orders   1. Benign prostatic hyperplasia without lower urinary tract symptoms     2. Erectile dysfunction due to arterial insufficiency     3. Gastroesophageal reflux disease without esophagitis     4. Essential hypertension     5. Mixed hyperlipidemia     6. Tendinopathy of rotator cuff, unspecified laterality     7. YUMIKO on CPAP     8. Stage 3 chronic kidney disease (HCC)     9. Obesity (BMI 35.0-39.9 without comorbidity)  Compression Stocking   10. Anxiety hyperventilation     11. Benign neoplasm of lower extremity     12. Seasonal allergic rhinitis due to pollen     13. Pedal edema           Objective:   Physical Exam  Vitals signs and nursing note reviewed. Constitutional:       Appearance: He is well-developed.       Comments: Obesity with sleep apnea on CPAP   HENT:      Head: Normocephalic and atraumatic. Right Ear: External ear normal.      Left Ear: External ear normal.      Nose: Nose normal.      Comments: Allergies with allergic rhinitis  Eyes:      Conjunctiva/sclera: Conjunctivae normal.      Pupils: Pupils are equal, round, and reactive to light. Neck:      Musculoskeletal: Normal range of motion and neck supple. Cardiovascular:      Rate and Rhythm: Normal rate and regular rhythm. Heart sounds: Normal heart sounds. Comments: Hypertension, hyperlipidemia  Pulmonary:      Effort: Pulmonary effort is normal.      Breath sounds: Normal breath sounds. Abdominal:      General: Bowel sounds are normal.      Palpations: Abdomen is soft. Genitourinary:     Comments: Chronic kidney disease  Musculoskeletal: Normal range of motion. Comments: Degenerative polyarthralgia  Spondylosis without any sign of myelopathy   Skin:     General: Skin is warm and dry. Neurological:      Mental Status: He is alert and oriented to person, place, and time. Deep Tendon Reflexes: Reflexes are normal and symmetric. Psychiatric:      Comments: Anxious, however, denies being depressed         Assessment:       Diagnosis Orders   1. Benign prostatic hyperplasia without lower urinary tract symptoms     2. Erectile dysfunction due to arterial insufficiency     3. Gastroesophageal reflux disease without esophagitis     4. Essential hypertension     5. Mixed hyperlipidemia     6. Tendinopathy of rotator cuff, unspecified laterality     7. YUMIKO on CPAP     8. Stage 3 chronic kidney disease (HCC)     9. Obesity (BMI 35.0-39.9 without comorbidity)  Compression Stocking   10. Anxiety hyperventilation     11. Benign neoplasm of lower extremity     12. Seasonal allergic rhinitis due to pollen     13. Pedal edema             Plan:      76-year-old anxious -American male returns for follow-up.   He is afebrile clinical examination is stable at baseline  Hypertension on Diovan, amlodipine that he is tolerating well. I will add Aldactone that in addition will help with his modest hypokalemia. He would benefit from low-fat high-fiber diet, daily moderate exercise, lifestyle change, weight reduction and consumption of less than 2 g of salt a day  Hypokalemia at 3.5. He is on ARB and potassium supplement. Aldactone will be an additional help  Dependent pedal edema with obesity and sedentary lifestyle and unhealthy eating habits. He will benefit from low-fat high-fiber diet, increase activity as tolerated, leg elevation. I have ordered compression stockings  Allergies allergic rhinitis. Continue Flonase, nasal saline. I have added antihistamine  Morbid obesity with sleep apnea. Continue CPAP and follow-up with pulmonology  BPH with improvement to Flomax lipidemia on statin that he is tolerating well  Prediabetes with A1c of 5.7. Risk factor stratification is advised  He appears very anxious, however, denies being depressed  Degenerative polyarthralgia. May take over-the-counter Tylenol as needed  Renal sufficiency with creatinine 1.08. Avoid nephrotoxic drugs, anti-inflammatory radiocontrast.  Maintain oral hydration  Med list and available labs reviewed, discussed with patient, questions answered  Further recommendations to follow  He is encouraged to call for any concern  This note is created with a voice recognition program and while intend to generate a document that accurately reflects the content of the visit, no guarantee can be provided that every mistake has been identified and corrected by editing.           Colton Rivera MD

## 2020-08-20 ENCOUNTER — VIRTUAL VISIT (OUTPATIENT)
Dept: PULMONOLOGY | Age: 60
End: 2020-08-20
Payer: COMMERCIAL

## 2020-08-20 PROCEDURE — G8427 DOCREV CUR MEDS BY ELIG CLIN: HCPCS | Performed by: INTERNAL MEDICINE

## 2020-08-20 PROCEDURE — 99214 OFFICE O/P EST MOD 30 MIN: CPT | Performed by: INTERNAL MEDICINE

## 2020-08-20 PROCEDURE — 3017F COLORECTAL CA SCREEN DOC REV: CPT | Performed by: INTERNAL MEDICINE

## 2020-08-20 NOTE — PROGRESS NOTES
2020    TELEHEALTH EVALUATION -- Audio/Visual (During BCYXZ-14 public health emergency)    Patient and physician are located in their individual locations. This is visit is completed via [email or web address] application []/ [employer]. me[] / Telephone [x]     HPI:    Yoana Wilson (:  1960) has requested an audio/video evaluation for the following concern(s):  Mr. Mariana Riddle is known to have obstructive sleep apnea syndrome that is being treated with CPAP. He uses a CPAP regularly every night CPAP has been very effective in relieving the apnea improving his daytime symptoms. His daytime symptoms are significantly improved. He does not take naps. His nighttime sleep is refreshing no choking episodes no morning headaches. We did not have any Lenorah Sleepiness Scale at this time. Patient also did not have any compliance report. Patient does complain of sinus symptoms generally made some it wakes him up at middle of the night. He is taking Flonase  However in spite of that he continues to have nasal stuffiness. He is been trying to lose weight and has lost about 3 pounds. He was provided a new machine which has been working very well for him. He is hypertensive his blood pressure has been stable. No symptoms of any nasal polyps. Or sinusitis. Review of Systems    Prior to Visit Medications    Medication Sig Taking?  Authorizing Provider   loratadine (CLARITIN) 10 MG tablet Take 1 tablet by mouth daily Yes Marcelle Miller MD   potassium chloride (KLOR-CON M) 20 MEQ extended release tablet TAKE one and half tab BY MOUTH EVERY DAY Yes Marcelle Miller MD   spironolactone (ALDACTONE) 50 MG tablet Take 1 tablet by mouth daily Yes Marcelle Miller MD   fluticasone (FLONASE) 50 MCG/ACT nasal spray 1 spray by Each Nostril route daily Yes Marcelle Miller MD   azelastine (ASTELIN) 0.1 % nasal spray 1 spray by Nasal route 2 times daily Use in each nostril as directed Yes Marcelle Miller MD   amLODIPine (NORVASC) 10 MG tablet TAKE ONE TABLET BY MOUTH EVERY DAY Yes Danielle Cochran MD   pravastatin (PRAVACHOL) 40 MG tablet TAKE ONE TABLET BY MOUTH EVERY DAY Yes Danielle Cochran MD   valsartan-hydrochlorothiazide (DIOVAN-HCT) 160-12.5 MG per tablet Uday Polo Yes Danielle Cochran MD   tamsulosin (FLOMAX) 0.4 MG capsule Take 0.4 mg by mouth daily Yes Charisma Knox MD       Social History     Tobacco Use    Smoking status: Never Smoker    Smokeless tobacco: Never Used   Substance Use Topics    Alcohol use: Yes     Comment: ocassionally    Drug use: No            RECORD REVIEW: Previous medical records were reviewed at today's visit. Wt Readings from Last 3 Encounters:   08/12/20 271 lb (122.9 kg)   06/24/20 272 lb 9.6 oz (123.7 kg)   02/03/20 282 lb (127.9 kg)       Results for orders placed or performed during the hospital encounter of 06/24/20   Psa screening   Result Value Ref Range    PSA 1.63 <4.1 ug/L   TSH without Reflex   Result Value Ref Range    TSH 2.42 0.30 - 5.00 mIU/L   Urinalysis with Microscopic   Result Value Ref Range    Color, UA YELLOW YELLOW    Turbidity UA CLEAR CLEAR    Glucose, Ur NEGATIVE NEGATIVE    Bilirubin Urine NEGATIVE NEGATIVE    Ketones, Urine NEGATIVE NEGATIVE    Specific [place], UA 1.005 1.005 - 1.030    Urine Hgb NEGATIVE NEGATIVE    pH, UA 7.5 5.0 - 8.0    Protein, UA NEGATIVE NEGATIVE    Urobilinogen, Urine Normal Normal    Nitrite, Urine NEGATIVE NEGATIVE    Leukocyte Esterase, Urine NEGATIVE NEGATIVE    -          WBC, UA None 0 - 5 /HPF    RBC, UA None 0 - 4 /HPF    Casts UA NOT REPORTED 0 - 8 /LPF    Crystals, UA NOT REPORTED None /HPF    Epithelial Cells UA None 0 - 5 /HPF    Renal Epithelial, UA NOT REPORTED 0 /HPF    Bacteria, UA NOT REPORTED None    Mucus, UA NOT REPORTED None    Trichomonas, UA NOT REPORTED None    Amorphous, UA NOT REPORTED None    Other Observations UA NOT REPORTED NOT REQ.     Yeast, UA NOT REPORTED None   Lipid Panel   Result Value Ref Range Cholesterol 124 <200 mg/dL    HDL 46 >40 mg/dL    LDL Cholesterol 64 0 - 130 mg/dL    Chol/HDL Ratio 2.7 <5    Triglycerides 69 <150 mg/dL    VLDL NOT REPORTED 1 - 30 mg/dL   Hemoglobin A1C   Result Value Ref Range    Hemoglobin A1C 5.6 4.0 - 6.0 %    Estimated Avg Glucose 114 mg/dL   Comprehensive Metabolic Panel   Result Value Ref Range    Glucose 97 70 - 99 mg/dL    BUN 9 8 - 23 mg/dL    CREATININE 0.99 0.70 - 1.20 mg/dL    Bun/Cre Ratio NOT REPORTED 9 - 20    Calcium 9.4 8.6 - 10.4 mg/dL    Sodium 144 135 - 144 mmol/L    Potassium 3.5 (L) 3.7 - 5.3 mmol/L    Chloride 105 98 - 107 mmol/L    CO2 24 20 - 31 mmol/L    Anion Gap 15 9 - 17 mmol/L    Alkaline Phosphatase 102 40 - 129 U/L    ALT 11 5 - 41 U/L    AST 15 <40 U/L    Total Bilirubin 1.31 (H) 0.3 - 1.2 mg/dL    Total Protein 7.8 6.4 - 8.3 g/dL    Alb 4.5 3.5 - 5.2 g/dL    Albumin/Globulin Ratio 1.4 1.0 - 2.5    GFR Non-African American >60 >60 mL/min    GFR African American >60 >60 mL/min    GFR Comment          GFR Staging NOT REPORTED    CBC   Result Value Ref Range    WBC 4.1 3.5 - 11.3 k/uL    RBC 5.05 4.21 - 5.77 m/uL    Hemoglobin 14.1 13.0 - 17.0 g/dL    Hematocrit 43.5 40.7 - 50.3 %    MCV 86.1 82.6 - 102.9 fL    MCH 27.9 25.2 - 33.5 pg    MCHC 32.4 28.4 - 34.8 g/dL    RDW 14.1 11.8 - 14.4 %    Platelets 047 357 - 071 k/uL    MPV 10.2 8.1 - 13.5 fL    NRBC Automated 0.0 0.0 per 100 WBC       No results found. PHYSICAL EXAMINATION:  Due to this being a TeleHealth encounter, evaluation of the following organ systems is limited: Vitals/Constitutional/EENT/Resp/CV/GI//MS/Neuro/Skin/Heme-Lymph-Imm. Constitutional: [x] Appears well-developed and well-nourished.      [x] Abnormal obesity   mental status  [x] Alert and awake  [x] Oriented to person/place/time [x]Able to follow commands    [x] No apparent distress      Eyes:  EOM    [x]  Normal  [] Abnormal-  Sclera  [x]  Normal  [] Abnormal -         Discharge [x]  None visible  [] Abnormal -    HENT: [x] Normocephalic, atraumatic. [] Abnormal shaped head   [x] Mouth/Throat: Mucous membranes are moist.     Ears [x] Normal  [] Abnormal-    Neck: [x] Normal range of motion [x] Supple [x] No visualized mass. Pulmonary/Chest: [x] Respiratory effort normal.  [x] No visualized signs of difficulty breathing or respiratory distress        [] Abnormal      Musculoskeletal:   [x] Normal range of motion. [x] Normal gait with no signs of ataxia. [x]  No signs of cyanosis of the peripheral portions of extremities. [] Abnormal       Neurological:        [x] Normal cranial nerve (limited exam to video visit) [x] No focal weakness observed       [] Abnormal          Speech       [x] Normal   [] Abnormal     Skin:        [x] No rash on visible skin  [x] Normal  [] Abnormal     Psychiatric:       [x] Normal  [] Abnormal        [x] Normal Mood  [] Anxious appearing        Other Pertinent Exam Findings:   Diagnosis  Obstructive sleep apnea syndrome  Obesity  Hypertension  Benign prostatic hypertrophy  Plan:   1. Sleep apnea responding very well to the use of CPAP machine he is very happy with a new machine which is working wonderful he uses 6 hours every night or sometimes more. He many times takes a nap during the day and he will use his CPAP machine as well.  2.   3. He is trying to make to lose weight he has been able to lose 3 pounds. He is making efforts however. 4.   5. Blood pressure is under good control. He denies any symptoms of benign prostatic hypertrophy at this time. 6.   7. No hematuria  8.   9. I advised the patient to continue the use of CPAP as before he already had flu vaccine  10.   11. He already had Pneumovax. 12.   13. I advised him to use normal saline nasal spray whenever his nose gets stuffed up. He will continue use of Flonase as well. 14.   15. I advised him to use a normal saline nose spray before going to bed even at night and especially in the morning again.   16.   17. He

## 2020-09-10 ENCOUNTER — NURSE TRIAGE (OUTPATIENT)
Dept: OTHER | Facility: CLINIC | Age: 60
End: 2020-09-10

## 2020-09-10 ENCOUNTER — TELEPHONE (OUTPATIENT)
Dept: INTERNAL MEDICINE CLINIC | Age: 60
End: 2020-09-10

## 2020-09-10 NOTE — TELEPHONE ENCOUNTER
Reason for Disposition   All other patients with chest pain    Answer Assessment - Initial Assessment Questions  1. LOCATION: \"Where does it hurt? \"        Right chest  2. RADIATION: \"Does the pain go anywhere else? \" (e.g., into neck, jaw, arms, back)      Center of chest to right ribs   3. ONSET: \"When did the chest pain begin? \" (Minutes, hours or days)       Last night happened after bending down  4. PATTERN \"Does the pain come and go, or has it been constant since it started? \"  \"Does it get worse with exertion? \"       Only one occurance  5. DURATION: \"How long does it last\" (e.g., seconds, minutes, hours)      2 minutes  6. SEVERITY: \"How bad is the pain? \"  (e.g., Scale 1-10; mild, moderate, or severe)     - MILD (1-3): doesn't interfere with normal activities      - MODERATE (4-7): interferes with normal activities or awakens from sleep     - SEVERE (8-10): excruciating pain, unable to do any normal activities        7/10  7. CARDIAC RISK FACTORS: \"Do you have any history of heart problems or risk factors for heart disease? \" (e.g., prior heart attack, angina; high blood pressure, diabetes, being overweight, high cholesterol, smoking, or strong family history of heart disease)      no  8. PULMONARY RISK FACTORS: \"Do you have any history of lung disease? \"  (e.g., blood clots in lung, asthma, emphysema, birth control pills)      no  9. CAUSE: \"What do you think is causing the chest pain? \"      Unsure, happened when bending over  10. OTHER SYMPTOMS: \"Do you have any other symptoms? \" (e.g., dizziness, nausea, vomiting, sweating, fever, difficulty breathing, cough)        no  11. PREGNANCY: \"Is there any chance you are pregnant? \" \"When was your last menstrual period? \"        n/a    Protocols used: CHEST PAIN-ADULT-[place]    Patient called due to one episode of chest pain last night after bending over to pick something up. He states the pain lasted 2 minutes and has now resolved. Warm transfer to scheduling center.  All

## 2020-09-11 ENCOUNTER — HOSPITAL ENCOUNTER (OUTPATIENT)
Age: 60
Discharge: HOME | End: 2020-09-13
Payer: COMMERCIAL

## 2020-09-11 ENCOUNTER — HOSPITAL ENCOUNTER (OUTPATIENT)
Dept: GENERAL RADIOLOGY | Age: 60
Discharge: HOME | End: 2020-09-13
Payer: COMMERCIAL

## 2020-09-11 ENCOUNTER — HOSPITAL ENCOUNTER (OUTPATIENT)
Age: 60
Discharge: HOME | End: 2020-09-11
Payer: COMMERCIAL

## 2020-09-11 ENCOUNTER — OFFICE VISIT (OUTPATIENT)
Dept: INTERNAL MEDICINE CLINIC | Age: 60
End: 2020-09-11
Payer: COMMERCIAL

## 2020-09-11 VITALS
OXYGEN SATURATION: 97 % | TEMPERATURE: 97.3 F | SYSTOLIC BLOOD PRESSURE: 138 MMHG | DIASTOLIC BLOOD PRESSURE: 80 MMHG | HEART RATE: 94 BPM | HEIGHT: 72 IN | WEIGHT: 267 LBS | BODY MASS INDEX: 36.16 KG/M2

## 2020-09-11 PROBLEM — R60.0 PEDAL EDEMA: Status: RESOLVED | Noted: 2020-08-12 | Resolved: 2020-09-11

## 2020-09-11 LAB
% CKMB: 0.6 % (ref 0–3.5)
-: ABNORMAL
ALBUMIN SERPL-MCNC: 4.4 G/DL (ref 3.5–5.2)
ALBUMIN/GLOBULIN RATIO: 1.2 (ref 1–2.5)
ALP BLD-CCNC: 96 U/L (ref 40–129)
ALT SERPL-CCNC: 14 U/L (ref 5–41)
AMORPHOUS: ABNORMAL
ANION GAP SERPL CALCULATED.3IONS-SCNC: 12 MMOL/L (ref 9–17)
AST SERPL-CCNC: 17 U/L
BACTERIA: ABNORMAL
BILIRUB SERPL-MCNC: 0.9 MG/DL (ref 0.3–1.2)
BILIRUBIN URINE: NEGATIVE
BUN BLDV-MCNC: 14 MG/DL (ref 8–23)
BUN/CREAT BLD: ABNORMAL (ref 9–20)
CALCIUM SERPL-MCNC: 9.6 MG/DL (ref 8.6–10.4)
CASTS UA: ABNORMAL /LPF (ref 0–8)
CHLORIDE BLD-SCNC: 104 MMOL/L (ref 98–107)
CK MB: 1.2 NG/ML
CKMB INTERPRETATION: NORMAL
CO2: 22 MMOL/L (ref 20–31)
COLOR: YELLOW
CREAT SERPL-MCNC: 1.23 MG/DL (ref 0.7–1.2)
CRYSTALS, UA: ABNORMAL /HPF
EPITHELIAL CELLS UA: ABNORMAL /HPF (ref 0–5)
GFR AFRICAN AMERICAN: >60 ML/MIN
GFR NON-AFRICAN AMERICAN: 60 ML/MIN
GFR SERPL CREATININE-BSD FRML MDRD: ABNORMAL ML/MIN/{1.73_M2}
GFR SERPL CREATININE-BSD FRML MDRD: ABNORMAL ML/MIN/{1.73_M2}
GLUCOSE BLD-MCNC: 106 MG/DL (ref 70–99)
GLUCOSE URINE: NEGATIVE
HCT VFR BLD CALC: 44.2 % (ref 40.7–50.3)
HEMOGLOBIN: 14.2 G/DL (ref 13–17)
KETONES, URINE: NEGATIVE
LEUKOCYTE ESTERASE, URINE: NEGATIVE
MCH RBC QN AUTO: 27.4 PG (ref 25.2–33.5)
MCHC RBC AUTO-ENTMCNC: 32.1 G/DL (ref 28.4–34.8)
MCV RBC AUTO: 85.2 FL (ref 82.6–102.9)
MUCUS: ABNORMAL
NITRITE, URINE: NEGATIVE
NRBC AUTOMATED: 0 PER 100 WBC
OTHER OBSERVATIONS UA: ABNORMAL
PDW BLD-RTO: 13.5 % (ref 11.8–14.4)
PH UA: 6.5 (ref 5–8)
PLATELET # BLD: 256 K/UL (ref 138–453)
PMV BLD AUTO: 10.2 FL (ref 8.1–13.5)
POTASSIUM SERPL-SCNC: 4.1 MMOL/L (ref 3.7–5.3)
PROTEIN UA: NEGATIVE
RBC # BLD: 5.19 M/UL (ref 4.21–5.77)
RBC UA: ABNORMAL /HPF (ref 0–4)
RENAL EPITHELIAL, UA: ABNORMAL /HPF
SODIUM BLD-SCNC: 138 MMOL/L (ref 135–144)
SPECIFIC GRAVITY UA: 1 (ref 1–1.03)
TOTAL CK: 194 U/L (ref 39–308)
TOTAL PROTEIN: 8.2 G/DL (ref 6.4–8.3)
TRICHOMONAS: ABNORMAL
TROPONIN INTERP: NORMAL
TROPONIN T: NORMAL NG/ML
TROPONIN, HIGH SENSITIVITY: 7 NG/L (ref 0–22)
TURBIDITY: CLEAR
URINE HGB: NEGATIVE
UROBILINOGEN, URINE: NORMAL
WBC # BLD: 5.7 K/UL (ref 3.5–11.3)
WBC UA: ABNORMAL /HPF (ref 0–5)
YEAST: ABNORMAL

## 2020-09-11 PROCEDURE — 71046 X-RAY EXAM CHEST 2 VIEWS: CPT

## 2020-09-11 PROCEDURE — G8417 CALC BMI ABV UP PARAM F/U: HCPCS | Performed by: FAMILY MEDICINE

## 2020-09-11 PROCEDURE — 85027 COMPLETE CBC AUTOMATED: CPT

## 2020-09-11 PROCEDURE — 81001 URINALYSIS AUTO W/SCOPE: CPT

## 2020-09-11 PROCEDURE — 1036F TOBACCO NON-USER: CPT | Performed by: FAMILY MEDICINE

## 2020-09-11 PROCEDURE — 36415 COLL VENOUS BLD VENIPUNCTURE: CPT

## 2020-09-11 PROCEDURE — 82550 ASSAY OF CK (CPK): CPT

## 2020-09-11 PROCEDURE — 80053 COMPREHEN METABOLIC PANEL: CPT

## 2020-09-11 PROCEDURE — 84484 ASSAY OF TROPONIN QUANT: CPT

## 2020-09-11 PROCEDURE — 82553 CREATINE MB FRACTION: CPT

## 2020-09-11 PROCEDURE — G8427 DOCREV CUR MEDS BY ELIG CLIN: HCPCS | Performed by: FAMILY MEDICINE

## 2020-09-11 PROCEDURE — 99214 OFFICE O/P EST MOD 30 MIN: CPT | Performed by: FAMILY MEDICINE

## 2020-09-11 PROCEDURE — 93000 ELECTROCARDIOGRAM COMPLETE: CPT | Performed by: FAMILY MEDICINE

## 2020-09-11 PROCEDURE — 3017F COLORECTAL CA SCREEN DOC REV: CPT | Performed by: FAMILY MEDICINE

## 2020-09-11 NOTE — PROGRESS NOTES
Subjective:      Patient ID: Toño Whitley is a 62 y.o. male. Chest Pain    This is a new problem. Episode onset: 3 days. The onset quality is sudden. The problem has been gradually improving. The pain is at a severity of 3/10. The pain is mild. The quality of the pain is described as dull. The pain does not radiate. The pain is aggravated by lifting and emotional upset. He has tried rest for the symptoms. The treatment provided moderate relief. Risk factors include stress, male gender, lack of exercise and sedentary lifestyle. His past medical history is significant for anxiety/panic attacks, hypertension and sleep apnea. Prior diagnostic workup includes chest x-ray (Cardiac enzymes, routine blood work and urinalysis). Review of Systems   Constitutional: Negative. HENT: Negative. Eyes: Negative. Respiratory: Negative. Cardiovascular: Positive for chest pain. Gastrointestinal: Negative. Endocrine: Negative. Musculoskeletal: Positive for arthralgias and myalgias. Skin: Negative. Allergic/Immunologic: Negative. Neurological: Negative. Hematological: Negative. Psychiatric/Behavioral: Positive for dysphoric mood. The patient is nervous/anxious. Past family and social history unremarkable. Diagnosis Orders   1. Chest pain, unspecified type  EKG 12 Lead   2. Benign prostatic hyperplasia without lower urinary tract symptoms     3. Erectile dysfunction due to arterial insufficiency     4. Gastroesophageal reflux disease without esophagitis     5. Essential hypertension     6. Mixed hyperlipidemia  Troponin I    CK Isoenzymes    CBC    Comprehensive Metabolic Panel    Urinalysis with Microscopic   7. Tendinopathy of rotator cuff, unspecified laterality     8. YUMIKO on CPAP  Troponin I    CK Isoenzymes    CBC    Comprehensive Metabolic Panel    Urinalysis with Microscopic   9. Stage 3 chronic kidney disease ([address]     10.  Obesity (BMI 35.0-39.9 without comorbidity)  Troponin I CK Isoenzymes    CBC    Comprehensive Metabolic Panel    Urinalysis with Microscopic   11. Anxiety hyperventilation     12. Seasonal allergic rhinitis due to pollen           Objective:   Physical Exam  Vitals signs and nursing note reviewed. Constitutional:       Appearance: He is well-developed. Comments: Obesity with sleep apnea on CPAP   HENT:      Head: Normocephalic and atraumatic. Right Ear: External ear normal.      Left Ear: External ear normal.      Nose: Nose normal.   Eyes:      Conjunctiva/sclera: Conjunctivae normal.      Pupils: Pupils are equal, round, and reactive to light. Neck:      Musculoskeletal: Normal range of motion and neck supple. Cardiovascular:      Rate and Rhythm: Normal rate and regular rhythm. Heart sounds: Normal heart sounds. Comments: Hypertension, hyperlipidemia  Pulmonary:      Effort: Pulmonary effort is normal.      Breath sounds: Normal breath sounds. Abdominal:      General: Bowel sounds are normal.      Palpations: Abdomen is soft. Comments: GERD   Genitourinary:     Comments: BPH, chronic kidney disease, erectile dysfunction  Musculoskeletal: Normal range of motion. Comments: Degenerative polyarthralgia   Skin:     General: Skin is warm and dry. Neurological:      Mental Status: He is alert and oriented to person, place, and time. Deep Tendon Reflexes: Reflexes are normal and symmetric. Psychiatric:      Comments: History of anxiety with underlying depression, panic attacks. He is established with psych         Assessment:       Diagnosis Orders   1. Chest pain, unspecified type  EKG 12 Lead   2. Benign prostatic hyperplasia without lower urinary tract symptoms     3. Erectile dysfunction due to arterial insufficiency     4. Gastroesophageal reflux disease without esophagitis     5. Essential hypertension     6.  Mixed hyperlipidemia  Troponin I    CK Isoenzymes    CBC    Comprehensive Metabolic Panel    Urinalysis with reduction  Hyperlipidemia on statin that he is tolerating well BPH with clinical improvement to Flomax  He denies tobacco, excessive alcohol or illicit drug use  Med list reviewed advised to continue  Available labs reviewed, discussed with patient, questions answered  History of chronic kidney disease, however, labs done on 6/2020 reads creatinine of 0.99  Further recommendations to follow labs  Once again he has been informed to go to the emergency room ASAP if there is any recurrent symptoms  This note is created with a voice recognition program and while intend to generate a document that accurately reflects the content of the visit, no guarantee can be provided that every mistake has been identified and corrected by editing.        Silvestre Leblanc MD

## 2020-09-28 RX ORDER — PRAVASTATIN SODIUM 40 MG
TABLET ORAL
Qty: 90 TABLET | Refills: 0 | Status: SHIPPED | OUTPATIENT
Start: 2020-09-28 | End: 2020-12-17

## 2020-09-28 RX ORDER — VALSARTAN AND HYDROCHLOROTHIAZIDE 160; 12.5 MG/1; MG/1
TABLET, FILM COATED ORAL
Qty: 90 TABLET | Refills: 0 | Status: SHIPPED | OUTPATIENT
Start: 2020-09-28 | End: 2020-12-17

## 2020-09-28 NOTE — TELEPHONE ENCOUNTER
Santiago Michelle is calling to request a refill on the following medication(s):    Medication Request:    Last filled 6/29/2020 #90 with 0 RF    Requested Prescriptions     Pending Prescriptions Disp Refills    valsartan-hydrochlorothiazide (DIOVAN-HCT) 160-12.5 MG per tablet [Pharmacy Med Name: valsartan 160 mg-hydrochlorothiazide 12.5 mg tablet] 90 tablet 0     Sig: TAKE ONE TABLET BY MOUTH EVERY DAY    pravastatin (PRAVACHOL) 40 MG tablet [Pharmacy Med Name: pravastatin 40 mg tablet] 90 tablet 0     Sig: TAKE ONE TABLET BY MOUTH EVERY DAY       Last Visit Date (If Applicable):  7/68/6823    Next Visit Date:    12/11/2020

## 2020-11-02 RX ORDER — SPIRONOLACTONE 50 MG/1
TABLET, FILM COATED ORAL
Qty: 90 TABLET | Refills: 0 | Status: SHIPPED | OUTPATIENT
Start: 2020-11-02

## 2020-11-02 RX ORDER — POTASSIUM CHLORIDE 20 MEQ/1
TABLET, EXTENDED RELEASE ORAL
Qty: 90 TABLET | Refills: 0 | Status: SHIPPED | OUTPATIENT
Start: 2020-11-02 | End: 2021-03-15

## 2020-11-02 NOTE — TELEPHONE ENCOUNTER
Susan Nichols is calling to request a refill on the following medication(s):    Medication Request:  Requested Prescriptions     Pending Prescriptions Disp Refills    potassium chloride (KLOR-CON M) 20 MEQ extended release tablet [Pharmacy Med Name: potassium chloride ER 20 mEq tablet,extended release(part/cryst)] 90 tablet 0     Sig: TAKE ONE TABLET BY MOUTH EVERY DAY    spironolactone (ALDACTONE) 50 MG tablet [Pharmacy Med Name: spironolactone 50 mg tablet] 90 tablet 1     Sig: TAKE ONE TABLET BY MOUTH EVERY DAY     Last filled 8/12/20 90 day no refill  90 day pending with no refill    Last Visit Date (If Applicable):  5/20/3839    Next Visit Date:    12/11/2020

## 2020-11-28 ENCOUNTER — HOSPITAL ENCOUNTER (EMERGENCY)
Age: 60
Discharge: HOME | End: 2020-11-29
Attending: EMERGENCY MEDICINE
Payer: COMMERCIAL

## 2020-11-28 VITALS
RESPIRATION RATE: 20 BRPM | OXYGEN SATURATION: 100 % | WEIGHT: 255 LBS | HEIGHT: 72 IN | HEART RATE: 88 BPM | DIASTOLIC BLOOD PRESSURE: 59 MMHG | BODY MASS INDEX: 34.54 KG/M2 | SYSTOLIC BLOOD PRESSURE: 126 MMHG | TEMPERATURE: 98.7 F

## 2020-11-28 PROCEDURE — 82947 ASSAY GLUCOSE BLOOD QUANT: CPT

## 2020-11-28 PROCEDURE — 99282 EMERGENCY DEPT VISIT SF MDM: CPT

## 2020-11-29 ENCOUNTER — APPOINTMENT (OUTPATIENT)
Dept: CT IMAGING | Age: 60
End: 2020-11-29
Payer: COMMERCIAL

## 2020-11-29 LAB — GLUCOSE BLD-MCNC: 89 MG/DL (ref 75–110)

## 2020-11-29 PROCEDURE — 70490 CT SOFT TISSUE NECK W/O DYE: CPT

## 2020-11-29 NOTE — ED PROVIDER NOTES
Mental Status: He is alert. Mental status is at baseline. Psychiatric:         Mood and Affect: Mood normal.         Behavior: Behavior normal.      Comments:     Limited exam secondary to Covid-19 pandemic. MEDICAL DECISION MAKING:   The patient is hemodynamically stable, afebrile, nontoxic-appearing. Physical exam unremarkable. Based on history and exam unclear etiology of symptoms, likely trauma. ED plan for CT scan soft tissue, reassess. DIAGNOSTIC RESULTS   EKG:All EKG's are interpreted by the Emergency Department Physician who either signs or Co-signs this chart in the absence of a cardiologist.        RADIOLOGY:All plain film, CT, MRI, and formal ultrasound images (except ED bedside ultrasound) are read by the radiologist, see reports below, unless otherwisenoted in MDM or here. CT SOFT TISSUE NECK WO CONTRAST   Final Result   No acute abnormality of the soft tissue structures of the neck. Note: Evaluation of neck soft tissues limited in absence of intravenous   iodinated contrast administration. LABS: All lab results were reviewed by myself, and all abnormals are listed below. Labs Reviewed   POC GLUCOSE FINGERSTICK       EMERGENCY DEPARTMENTCOURSE:   Patient did well in the ED. He is tolerating p.o.  CT scan of neck unremarkable. No indications for emergent endoscopy. Patient will follow up with his doctor on Monday. No further work-up indicated this time. Nursing notes reviewed. At this time this is what I find, the patient appears well and does not appear sick or toxic. I gave my usual and customary discussion of the risks and benefits of discharge versus admission. I answered the patient's questions. I gave the patient strict return precautions. Patient expressed understanding of the discharge instructions. Dictated but not reviewed.       Vitals:    Vitals:    11/28/20 2354 11/28/20 2356   BP:  (!) 126/59   Pulse:  88   Resp:  20   Temp:  98.7 °F (37.1 °C)   TempSrc:  Oral   SpO2:  100%   Weight: 255 lb (115.7 kg)    Height: 6' (1.829 m)        The patient was given the following medications while in the emergency department:  No orders of the defined types were placed in this encounter. CONSULTS:  None    FINAL IMPRESSION      1.  Dysphagia, unspecified type          DISPOSITION/PLAN   DISPOSITION Decision To Discharge 11/29/2020 01:10:55 AM      PATIENT REFERRED TO:  Anjana Flowers MD  [address] [address] [place] [address]    In 2 days      DISCHARGE MEDICATIONS:  New Prescriptions    No medications on file     Luke Hernández MD  Attending Emergency Physician                    Babs Lees MD  11/29/20 7060

## 2020-11-30 ENCOUNTER — OFFICE VISIT (OUTPATIENT)
Dept: INTERNAL MEDICINE CLINIC | Age: 60
End: 2020-11-30
Payer: COMMERCIAL

## 2020-11-30 VITALS
WEIGHT: 257 LBS | SYSTOLIC BLOOD PRESSURE: 116 MMHG | TEMPERATURE: 98.4 F | DIASTOLIC BLOOD PRESSURE: 60 MMHG | BODY MASS INDEX: 34.81 KG/M2 | HEIGHT: 72 IN | RESPIRATION RATE: 18 BRPM | HEART RATE: 76 BPM

## 2020-11-30 PROCEDURE — 90694 VACC AIIV4 NO PRSRV 0.5ML IM: CPT | Performed by: FAMILY MEDICINE

## 2020-11-30 PROCEDURE — 3017F COLORECTAL CA SCREEN DOC REV: CPT | Performed by: FAMILY MEDICINE

## 2020-11-30 PROCEDURE — 99214 OFFICE O/P EST MOD 30 MIN: CPT | Performed by: FAMILY MEDICINE

## 2020-11-30 PROCEDURE — G8417 CALC BMI ABV UP PARAM F/U: HCPCS | Performed by: FAMILY MEDICINE

## 2020-11-30 PROCEDURE — 1036F TOBACCO NON-USER: CPT | Performed by: FAMILY MEDICINE

## 2020-11-30 PROCEDURE — G8484 FLU IMMUNIZE NO ADMIN: HCPCS | Performed by: FAMILY MEDICINE

## 2020-11-30 PROCEDURE — 90471 IMMUNIZATION ADMIN: CPT | Performed by: FAMILY MEDICINE

## 2020-11-30 PROCEDURE — G8427 DOCREV CUR MEDS BY ELIG CLIN: HCPCS | Performed by: FAMILY MEDICINE

## 2020-11-30 NOTE — PROGRESS NOTES
Subjective:      Patient ID: Jeanette Heart is a 62 y.o. male. Hypertension   This is a chronic problem. The current episode started more than 1 month ago. The problem has been gradually improving since onset. The problem is controlled. Associated symptoms include anxiety. Risk factors for coronary artery disease include stress, obesity, male gender and dyslipidemia. Past treatments include angiotensin blockers and diuretics. The current treatment provides moderate improvement. Compliance problems include psychosocial issues. Identifiable causes of hypertension include sleep apnea. Review of Systems   Constitutional: Negative. HENT: Negative. Eyes: Negative. Respiratory: Negative. Cardiovascular: Negative. Gastrointestinal: Negative. Endocrine: Negative. Musculoskeletal: Negative. Skin: Negative. Allergic/Immunologic: Negative. Neurological: Negative. Hematological: Negative. Psychiatric/Behavioral: The patient is nervous/anxious. Past family and social history unremarkable. Diagnosis Orders   1. Other dysphagia  Oumar Hernandez MD, Gastroenterology, [address]   2. Globus syndrome     3. Benign prostatic hyperplasia without lower urinary tract symptoms     4. Erectile dysfunction due to arterial insufficiency     5. Gastroesophageal reflux disease without esophagitis     6. Essential hypertension     7. Need for influenza vaccination  INFLUENZA, QUADV, ADJUVANTED, 65 YRS =, IM, PF, PREFILL SYR, 0.5ML (FLUAD)   8. Mixed hyperlipidemia     9. Tendinopathy of rotator cuff, unspecified laterality     10. YUMIKO on CPAP  Oumar Hernandez MD, Gastroenterology, [address]   11. Stage 3 chronic kidney disease, unspecified whether stage 3a or 3b CKD     12. Obesity (BMI 35.0-39.9 without comorbidity)     13. Anxiety hyperventilation  Oumar Hernandez MD, Gastroenterology, [address]   14. Benign neoplasm of lower extremity     15.  Seasonal allergic rhinitis, unspecified trigger           Objective:   Physical Exam  Vitals signs and nursing note reviewed. Constitutional:       Appearance: He is well-developed. HENT:      Head: Normocephalic and atraumatic. Right Ear: External ear normal.      Left Ear: External ear normal.      Nose: Nose normal.   Eyes:      Conjunctiva/sclera: Conjunctivae normal.      Pupils: Pupils are equal, round, and reactive to light. Neck:      Musculoskeletal: Normal range of motion and neck supple. Cardiovascular:      Rate and Rhythm: Normal rate and regular rhythm. Heart sounds: Normal heart sounds. Comments: Hypertension, hyperlipidemia  Pulmonary:      Effort: Pulmonary effort is normal.      Breath sounds: Normal breath sounds. Abdominal:      General: Bowel sounds are normal.      Palpations: Abdomen is soft. Comments: GERD   Genitourinary:     Comments: Erectile dysfunction  CKD 3  BPH  Musculoskeletal: Normal range of motion. Comments: Rotator cuff arthropathy. Neurologically intact. He continues to have good range of motion. Sulcus sign is negative. Neurologically intact   Skin:     General: Skin is warm and dry. Neurological:      Mental Status: He is alert and oriented to person, place, and time. Deep Tendon Reflexes: Reflexes are normal and symmetric. Psychiatric:      Comments: Anxiety         Assessment:       Diagnosis Orders   1. Other dysphagia  Vinh Tse MD, Gastroenterology, [address]   2. Globus syndrome     3. Benign prostatic hyperplasia without lower urinary tract symptoms     4. Erectile dysfunction due to arterial insufficiency     5. Gastroesophageal reflux disease without esophagitis     6. Essential hypertension     7. Need for influenza vaccination  INFLUENZA, QUADV, ADJUVANTED, 65 YRS =, IM, PF, PREFILL SYR, 0.5ML (FLUAD)   8. Mixed hyperlipidemia     9. Tendinopathy of rotator cuff, unspecified laterality     10.  YUMIKO on CPAP been identified and corrected by editing.             Elizabeth Mane MD

## 2020-12-03 ENCOUNTER — TELEPHONE (OUTPATIENT)
Dept: GASTROENTEROLOGY | Age: 60
End: 2020-12-03

## 2020-12-03 NOTE — TELEPHONE ENCOUNTER
Writer called patient back. Offered him sooner appointment at [facility] 12/10/20. Patient thanked Keli Abbott.

## 2020-12-03 NOTE — TELEPHONE ENCOUNTER
Received a call from the patient and his wife to see if we had anything sooner due to the dysphagia he is having. Currently scheduled for 12/23/20. Offered a virtual visit but declined. Does not feel that would be helpful with what he has going on. Placed on a cancellation list.  Writer thanked and call ended. Please note that this patient was seen in the ER for this on 11/28/20 and by Dr. Christelle Jain on 11/30/20. Please review to see if we should perhaps get him in sooner with another provider.

## 2020-12-10 ENCOUNTER — OFFICE VISIT (OUTPATIENT)
Dept: GASTROENTEROLOGY | Age: 60
End: 2020-12-10
Payer: COMMERCIAL

## 2020-12-10 VITALS — WEIGHT: 264 LBS | BODY MASS INDEX: 35.8 KG/M2 | DIASTOLIC BLOOD PRESSURE: 77 MMHG | SYSTOLIC BLOOD PRESSURE: 137 MMHG

## 2020-12-10 PROCEDURE — 1036F TOBACCO NON-USER: CPT | Performed by: INTERNAL MEDICINE

## 2020-12-10 PROCEDURE — G8427 DOCREV CUR MEDS BY ELIG CLIN: HCPCS | Performed by: INTERNAL MEDICINE

## 2020-12-10 PROCEDURE — G8484 FLU IMMUNIZE NO ADMIN: HCPCS | Performed by: INTERNAL MEDICINE

## 2020-12-10 PROCEDURE — G8417 CALC BMI ABV UP PARAM F/U: HCPCS | Performed by: INTERNAL MEDICINE

## 2020-12-10 PROCEDURE — 3017F COLORECTAL CA SCREEN DOC REV: CPT | Performed by: INTERNAL MEDICINE

## 2020-12-10 PROCEDURE — 99203 OFFICE O/P NEW LOW 30 MIN: CPT | Performed by: INTERNAL MEDICINE

## 2020-12-10 ASSESSMENT — ENCOUNTER SYMPTOMS
EYES NEGATIVE: 1
ALLERGIC/IMMUNOLOGIC NEGATIVE: 1
TROUBLE SWALLOWING: 1
GASTROINTESTINAL NEGATIVE: 1
CHOKING: 1

## 2020-12-10 NOTE — PROGRESS NOTES
Reason for Referral: Acute oropharyngeal dysphagia       Jo-Ann Moore MD  [address]  [facility],  [place]    Chief Complaint   Patient presents with   [facility] New Patient     referred for dysphagia.  Dysphagia     Patient states he has had this problem for the past 2 weeks. In the begining he had trouble swallowing drinks. He states whatever is supposed to \"push the food down\" is not working. He now is able to swallow liquids and is only eating soups. Denies vomiting and nausea.  Gastroesophageal Reflux     Patient states having GERD in the past but does not take medication for it. HISTORY OF PRESENT ILLNESS: Kalli Russo is a 56 y.o. 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  Patient reports that he had scrambled eggs and toast and since then he has been having difficulty swallowing localized to the oropharyngeal area. Denies any globus sensation. Denies any regurgitation or chest symptoms. No other GI symptoms. Patient reports that he is able to tolerate liquids with more thicker consistency currently which is improvement from previous. Has yet to try solid food due to fear of dysphagia. Denies any neurological symptoms that might suggest a possible TIA or CVA. 2007--throat surgery-- resection of benign growth around vocal cords. Smoker: none   Drinking history: 10 months quit, social  Abdominal surgeries: none   Prior Colonoscopy: 2017--next in 10 yrs  Prior EGD: 2010 dysphagia symptoms at that time  FH of GI issues: none    12 days ago. Liquids can tolerate.      Past Medical,Family, and Social History reviewed and does contribute to the patient presentingcondition. Patient's PMH/PSH,SH,PSYCH Hx, MEDs, ALLERGIES, and ROS were all reviewed and updated in the appropriate sections. PAST MEDICAL HISTORY:  Past Medical History:   Diagnosis Date    BPH (benign prostatic hyperplasia)     Class 2 severe obesity due to excess calories with serious comorbidity in adult [facility])     Obesity (BMI 30.0-34. 9) [E66.9]    Erectile dysfunction     GERD (gastroesophageal reflux disease)     Hiatal hernia     Hyperlipidemia     Hypertension     YUMIKO on CPAP     PONV (postoperative nausea and vomiting)     nauseated after last surgery    Unspecified sleep apnea     cpap nightly       Past Surgical History:   Procedure Laterality Date    COLONOSCOPY      ENDOSCOPY, COLON, DIAGNOSTIC      HERNIA REPAIR  2010    WA DESTR PENIS LESN,SIMPL,SURG EXCIS N/A 7/31/2018    SUPRA PUBIC LESIONS BIOPSY EXCISION performed by Christiano Mclean MD at [facility] 07/31/2018    SUPRA PUBIC LESIONS BIOPSY EXCISION (N/A )    THROAT SURGERY  2008    TUMOR REMOVAL Right 09/23/2016    [facility]  right thigh   (benign)       CURRENT MEDICATIONS:    Current Outpatient Medications:     potassium chloride (KLOR-CON M) 20 MEQ extended release tablet, TAKE ONE TABLET BY MOUTH EVERY DAY, Disp: 90 tablet, Rfl: 0    spironolactone (ALDACTONE) 50 MG tablet, TAKE ONE TABLET BY MOUTH EVERY DAY, Disp: 90 tablet, Rfl: 0    valsartan-hydrochlorothiazide (DIOVAN-HCT) 160-12.5 MG per tablet, TAKE ONE TABLET BY MOUTH EVERY DAY, Disp: 90 tablet, Rfl: 0    pravastatin (PRAVACHOL) 40 MG tablet, TAKE ONE TABLET BY MOUTH EVERY DAY, Disp: 90 tablet, Rfl: 0    loratadine (CLARITIN) 10 MG tablet, Take 1 tablet by mouth daily, Disp: 90 tablet, Rfl: 1    fluticasone (FLONASE) 50 MCG/ACT nasal spray, 1 spray by Each Nostril route daily, Disp: 2 Bottle, Rfl: 1    azelastine (ASTELIN) 0.1 % nasal spray, 1 spray by Nasal route 2 times daily Use in each nostril as directed, Disp: 2 Bottle, Rfl: discharge or sore throat. Cardiac:  No chest pain, dyspnea, orthopnea or PND. Chest:   No cough, phlegm or wheezing. Abdomen:      Detailed by MA   Neuro:  No focal weakness, abnormal movements or seizure like activity. Skin:   No rashes, no itching. :   No hematuria, no pyuria, no dysuria, no flank pain. Extremities:  No swelling or joint pains. ROS was otherwise negative    Review of Systems   Constitutional: Negative. HENT: Positive for trouble swallowing. Eyes: Negative. Respiratory: Positive for choking. Cardiovascular: Negative. Gastrointestinal: Negative. Endocrine: Negative. Genitourinary: Negative. Musculoskeletal: Negative. Skin: Negative. Allergic/Immunologic: Negative. Neurological: Positive for light-headedness. Hematological: Negative. Psychiatric/Behavioral: Negative. All other systems reviewed and are negative. PHYSICAL EXAMINATION: Vital signs reviewed per the nursing documentation. /77   Wt 264 lb (119.7 kg)   BMI 35.80 kg/m²   Body mass index is 35.8 kg/m². Physical Exam    Physical Exam   Constitutional: Patient is oriented to person, place, and time. Patient appears well-developed and well-nourished. HENT:   Head: Normocephalic and atraumatic. Eyes: Pupils are equal, round, and reactive to light. EOM are normal.   Neck: Normal range of motion. Neck supple. No JVD present. No tracheal deviation present. No thyromegaly present. Cardiovascular: Normal rate, regular rhythm, normal heart sounds and intact distal pulses. Pulmonary/Chest: Effort normal and breath sounds normal. No stridor. No respiratory distress. He has no wheezes. He has no rales. He exhibits no tenderness. Abdominal: Soft. Bowel sounds are normal. He exhibits no distension and no mass. There is no tenderness. There is no rebound and no guarding. No hernia. Musculoskeletal: Normal range of motion. Lymphadenopathy:    Patient has no cervical adenopathy. Neurological: Patient is alert and oriented to person, place, and time. Psychiatric: Patient has a normal mood and affect. Patient behavior is normal.       LABORATORY DATA: Reviewed  Lab Results   Component Value Date    WBC 5.7 09/11/2020    HGB 14.2 09/11/2020    HCT 44.2 09/11/2020    MCV 85.2 09/11/2020     09/11/2020     09/11/2020    K 4.1 09/11/2020     09/11/2020    CO2 22 09/11/2020    BUN 14 09/11/2020    CREATININE 1.23 (H) 09/11/2020    LABPROT 7.9 05/09/2012    LABALBU 4.4 09/11/2020    BILITOT 0.90 09/11/2020    ALKPHOS 96 09/11/2020    AST 17 09/11/2020    ALT 14 09/11/2020         Lab Results   Component Value Date    RBC 5.19 09/11/2020    HGB 14.2 09/11/2020    MCV 85.2 09/11/2020    MCH 27.4 09/11/2020    MCHC 32.1 09/11/2020    RDW 13.5 09/11/2020    MPV 10.2 09/11/2020    BASOPCT 1 07/20/2018    LYMPHSABS 1.60 07/20/2018    MONOSABS 0.40 07/20/2018    NEUTROABS 3.00 07/20/2018    EOSABS 0.00 07/20/2018    BASOSABS 0.00 07/20/2018         DIAGNOSTIC TESTING:     Ct Soft Tissue Neck Wo Contrast    Result Date: 11/29/2020  EXAMINATION: CT OF THE NECK WITHOUT CONTRAST  11/29/2020 TECHNIQUE: CT of the neck was performed without the administration of intravenous contrast. Multiplanar reformatted images are provided for review. Dose modulation, iterative reconstruction, and/or weight based adjustment of the mA/kV was utilized to reduce the radiation dose to as low as reasonably achievable. COMPARISON: None. HISTORY: ORDERING SYSTEM PROVIDED HISTORY: dysphagia TECHNOLOGIST PROVIDED HISTORY: dysphagia Reason for Exam: pt states diff swallowing at times Acuity: Acute Type of Exam: Initial FINDINGS: PHARYNX/LARYNX:  The palatine tonsils are normal in appearance. The tongue is normal in appearance. The valleculae, epiglottis, aryepiglottic folds and pyriform sinuses appear unremarkable. The true and false vocal cords are normal in appearance. No mass or abscess is seen.  SALIVARY odynophagia. Patient underwent recent CT soft tissue neck after presenting to emerge department approximately 2 weeks ago without any obvious structural abnormalities. Will provide referral to ENT and speech-language pathology to further evaluate patient's oropharyngeal dysphagia symptoms will send TSH. We will plan on diagnostic upper endoscopy    2) follow-up in 2 months          Thank you for allowing me to participate in the care of Mr. Darene Carrel. For any further questions please do not hesitate to contact me. I have reviewed and agree with the MA/LPN ROS please refer to their documentation from today's encounter on a separate note. Desirae Moreno MD, MPH   [facility] Gastroenter[facility]  Office #: [phone]          this note is created with the assistance of a speech recognition program.  While intending to generate a document that actually reflects the content of the visit, the document can still have some errors including those of syntax and sound a like substitutions which may escape proof reading. It such instances, actual meaning can be extrapolated by contextual diversion.

## 2020-12-14 ENCOUNTER — HOSPITAL ENCOUNTER (OUTPATIENT)
Age: 60
Setting detail: SPECIMEN
Discharge: HOME | End: 2020-12-14
Payer: COMMERCIAL

## 2020-12-14 LAB — TSH SERPL DL<=0.05 MIU/L-ACNC: 2.43 MIU/L (ref 0.3–5)

## 2020-12-17 ENCOUNTER — HOSPITAL ENCOUNTER (OUTPATIENT)
Dept: LAB | Age: 60
Setting detail: SPECIMEN
Discharge: HOME | End: 2020-12-17
Payer: COMMERCIAL

## 2020-12-17 PROCEDURE — U0003 INFECTIOUS AGENT DETECTION BY NUCLEIC ACID (DNA OR RNA); SEVERE ACUTE RESPIRATORY SYNDROME CORONAVIRUS 2 (SARS-COV-2) (CORONAVIRUS DISEASE [COVID-19]), AMPLIFIED PROBE TECHNIQUE, MAKING USE OF HIGH THROUGHPUT TECHNOLOGIES AS DESCRIBED BY CMS-2020-01-R: HCPCS

## 2020-12-17 RX ORDER — VALSARTAN AND HYDROCHLOROTHIAZIDE 160; 12.5 MG/1; MG/1
TABLET, FILM COATED ORAL
Qty: 90 TABLET | Refills: 1 | Status: SHIPPED | OUTPATIENT
Start: 2020-12-17 | End: 2021-06-21

## 2020-12-17 RX ORDER — AMLODIPINE BESYLATE 10 MG/1
TABLET ORAL
Qty: 90 TABLET | Refills: 1 | Status: SHIPPED | OUTPATIENT
Start: 2020-12-17 | End: 2021-06-21

## 2020-12-17 RX ORDER — PRAVASTATIN SODIUM 40 MG
TABLET ORAL
Qty: 90 TABLET | Refills: 1 | Status: SHIPPED | OUTPATIENT
Start: 2020-12-17 | End: 2021-06-24

## 2020-12-17 NOTE — TELEPHONE ENCOUNTER
Jose Campbell is calling to request a refill on the following medication(s):    Medication Request:  Requested Prescriptions     Pending Prescriptions Disp Refills    valsartan-hydrochlorothiazide (DIOVAN-HCT) 160-12.5 MG per tablet [Pharmacy Med Name: valsartan 160 mg-hydrochlorothiazide 12.5 mg tablet] 90 tablet 0     Sig: TAKE ONE TABLET BY MOUTH EVERY DAY    pravastatin (PRAVACHOL) 40 MG tablet [Pharmacy Med Name: pravastatin 40 mg tablet] 90 tablet 0     Sig: TAKE ONE TABLET BY MOUTH EVERY DAY    amLODIPine (NORVASC) 10 MG tablet [Pharmacy Med Name: amlodipine 10 mg tablet] 90 tablet 1     Sig: TAKE ONE TABLET BY MOUTH EVERY DAY     Last filled 9/28/20 90 day no refill  Order pendig    Last Visit Date (If Applicable):  48/74/9253    Next Visit Date:    3/1/2021

## 2020-12-18 LAB
SARS-COV-2, RAPID: NORMAL
SARS-COV-2: NORMAL
SARS-COV-2: NOT DETECTED
SOURCE: NORMAL

## 2020-12-21 ENCOUNTER — HOSPITAL ENCOUNTER (OUTPATIENT)
Age: 60
Setting detail: OUTPATIENT SURGERY
Discharge: HOME | End: 2020-12-21
Attending: INTERNAL MEDICINE | Admitting: INTERNAL MEDICINE
Payer: COMMERCIAL

## 2020-12-21 ENCOUNTER — ANESTHESIA (OUTPATIENT)
Dept: ENDOSCOPY | Age: 60
End: 2020-12-21
Payer: COMMERCIAL

## 2020-12-21 ENCOUNTER — ANESTHESIA EVENT (OUTPATIENT)
Dept: ENDOSCOPY | Age: 60
End: 2020-12-21
Payer: COMMERCIAL

## 2020-12-21 VITALS
SYSTOLIC BLOOD PRESSURE: 95 MMHG | RESPIRATION RATE: 17 BRPM | DIASTOLIC BLOOD PRESSURE: 58 MMHG | OXYGEN SATURATION: 99 %

## 2020-12-21 VITALS
TEMPERATURE: 98.7 F | RESPIRATION RATE: 24 BRPM | BODY MASS INDEX: 34.54 KG/M2 | HEART RATE: 81 BPM | DIASTOLIC BLOOD PRESSURE: 66 MMHG | OXYGEN SATURATION: 100 % | HEIGHT: 72 IN | SYSTOLIC BLOOD PRESSURE: 118 MMHG | WEIGHT: 255 LBS

## 2020-12-21 PROCEDURE — 2580000003 HC RX 258: Performed by: INTERNAL MEDICINE

## 2020-12-21 PROCEDURE — 3700000000 HC ANESTHESIA ATTENDED CARE: Performed by: INTERNAL MEDICINE

## 2020-12-21 PROCEDURE — 7100000011 HC PHASE II RECOVERY - ADDTL 15 MIN: Performed by: INTERNAL MEDICINE

## 2020-12-21 PROCEDURE — 6360000002 HC RX W HCPCS: Performed by: NURSE ANESTHETIST, CERTIFIED REGISTERED

## 2020-12-21 PROCEDURE — 2709999900 HC NON-CHARGEABLE SUPPLY: Performed by: INTERNAL MEDICINE

## 2020-12-21 PROCEDURE — 7100000010 HC PHASE II RECOVERY - FIRST 15 MIN: Performed by: INTERNAL MEDICINE

## 2020-12-21 PROCEDURE — 88305 TISSUE EXAM BY PATHOLOGIST: CPT

## 2020-12-21 PROCEDURE — 3700000001 HC ADD 15 MINUTES (ANESTHESIA): Performed by: INTERNAL MEDICINE

## 2020-12-21 PROCEDURE — 3609012400 HC EGD TRANSORAL BIOPSY SINGLE/MULTIPLE: Performed by: INTERNAL MEDICINE

## 2020-12-21 PROCEDURE — 43239 EGD BIOPSY SINGLE/MULTIPLE: CPT | Performed by: INTERNAL MEDICINE

## 2020-12-21 PROCEDURE — 2500000003 HC RX 250 WO HCPCS: Performed by: NURSE ANESTHETIST, CERTIFIED REGISTERED

## 2020-12-21 RX ORDER — OMEPRAZOLE 20 MG/1
20 CAPSULE, DELAYED RELEASE ORAL DAILY
COMMUNITY
End: 2021-02-22

## 2020-12-21 RX ORDER — LIDOCAINE HYDROCHLORIDE 10 MG/ML
INJECTION, SOLUTION INFILTRATION; PERINEURAL PRN
Status: DISCONTINUED | OUTPATIENT
Start: 2020-12-21 | End: 2020-12-21 | Stop reason: SDUPTHER

## 2020-12-21 RX ORDER — PROPOFOL 10 MG/ML
INJECTION, EMULSION INTRAVENOUS PRN
Status: DISCONTINUED | OUTPATIENT
Start: 2020-12-21 | End: 2020-12-21 | Stop reason: SDUPTHER

## 2020-12-21 RX ORDER — SODIUM CHLORIDE 9 MG/ML
INJECTION, SOLUTION INTRAVENOUS CONTINUOUS
Status: DISCONTINUED | OUTPATIENT
Start: 2020-12-21 | End: 2020-12-21 | Stop reason: HOSPADM

## 2020-12-21 RX ADMIN — SODIUM CHLORIDE: 9 INJECTION, SOLUTION INTRAVENOUS at 10:34

## 2020-12-21 RX ADMIN — PROPOFOL 350 MG: 10 INJECTION, EMULSION INTRAVENOUS at 11:07

## 2020-12-21 RX ADMIN — LIDOCAINE HYDROCHLORIDE 50 MG: 10 INJECTION, SOLUTION INFILTRATION; PERINEURAL at 11:07

## 2020-12-21 ASSESSMENT — PAIN - FUNCTIONAL ASSESSMENT: PAIN_FUNCTIONAL_ASSESSMENT: 0-10

## 2020-12-21 ASSESSMENT — PAIN SCALES - GENERAL: PAINLEVEL_OUTOF10: 0

## 2020-12-21 NOTE — ANESTHESIA POSTPROCEDURE EVALUATION
Department of Anesthesiology  Postprocedure Note    Patient: Jeanette Heart  MRN: [id number]  YOB: 1960  Date of evaluation: 12/21/2020  Time:  12:20 PM     Procedure Summary     Date: 12/21/20 Room / Location: [address]    Anesthesia Start: 9779 Anesthesia Stop: 1254    Procedure: EGD BIOPSY (N/A ) Diagnosis: (DYSPHAGIA)    Surgeons: Rosa Farley MD Responsible Provider: Traci Ennis MD    Anesthesia Type: MAC ASA Status: 3          Anesthesia Type: MAC    Leonarda Phase I: Leonarda Score: 10    Leonarda Phase II: Leonarda Score: 10    Last vitals: Reviewed and per EMR flowsheets.        Anesthesia Post Evaluation    Patient location during evaluation: PACU  Patient participation: complete - patient participated  Level of consciousness: awake  Pain score: 1  Airway patency: patent  Nausea & Vomiting: no nausea and no vomiting  Complications: no  Cardiovascular status: blood pressure returned to baseline and hemodynamically stable  Respiratory status: acceptable  Hydration status: euvolemic

## 2020-12-21 NOTE — OP NOTE
Operative Note          [facility]    [facility] ENDOSCOPY     EGD    PROCEDURE DATE: 12/21/20    REFERRING PHYSICIAN: No ref. provider found     PRIMARY CARE PROVIDER: Bety Johnson MD    ATTENDING PHYSICIAN: Rubina Riddle MD     HISTORY: Mr. Ramila Alamo is a 56 y.o. male who presents to the [facility] Endoscopy unit for upper endoscopy. The patient's clinical history is remarkable for BPH, GERD, YUMIKO, referred for EGD for OP dysphagia symptoms. He is currently medically stable and appropriate for the planned procedure. PREOPERATIVE DIAGNOSIS: OP dysphagia. PROCEDURES:   1) Transoral Upper Endoscopy with cold biopsy. POSTOPERATIVE DIAGNOSIS:     1) Normal appearing esophageal mucosa, normal appearing GEJ and Z-line. Cold biopsy taken of distal and proximal esophagus  2) Mild gastritis/antritis, easily irritable with water irrigation alone. Cold biopsy taken to evaluate for H. Pylori  3) Normal appearing duodenum     MEDICATIONS:   MAC per anesthesia     EBL: <10cc    INSTRUMENT: Olympus GIF-H180  flexible Gastroscope. PREPARATION: The nature and character of the procedure as well as risks, benefits, and alternatives were discussed with the patient and informed consent was obtained. Complications were said to include, but were not limited to: medication allergy, medication reaction, cardiovascular and respiratory problems, bleeding, perforation, infection, and/or missed diagnosis. Following arrival in the endoscopy room, the patient was placed in the left lateral decubitus position and final time-out accomplished in the presence of the nursing staff. Baseline vital signs were obtained and reviewed, and IV sedation was subsequently initiated. FINDINGS:   Esophagus: The esophagus was inspected to the Z-line. The endoscopic exam showed normal appearing esophageal mucosa, no esophagitis, no rings, lesions, or webs. Stomach: The stomach was inspected in both forward and retroflex fashion and was appropriately distensible. The cardia, fundus, incisura, antrum and pylorus were identified via direct visualization. The endoscopic exam showed gastritis, mild to moderate amount. Duodenum: The proximal small bowel was inspected through the bulb, sweep, and second portion of the duodenum. The endoscopic exam showed normal appearing duodenum. IMPRESSION:    1) Normal appearing esophageal mucosa, normal appearing GEJ and Z-line. Cold biopsy taken of distal and proximal esophagus  2) Mild gastritis/antritis, easily irritable with water irrigation alone. Cold biopsy taken to evaluate for H. Pylori  3) Normal appearing duodenum     RECOMMENDATIONS:   1) Follow up with referring provider, as previously scheduled. 2) Follow up path in GI clinic. [facility]     This note is created with the assistance of a speech recognition program.  While intending to generate a document that actually reflects the content of the visit, the document can still have some errors including those of syntax and sound a like substitutions which may escape proof reading. It such instances, actual meaning can be extrapolated by contextual diversion. The patient was counseled at length about the risks of portillo Covid-19 during their perioperative period and any recovery window from their procedure. The patient was made aware that portillo Covid-19  may worsen their prognosis for recovering from their procedure  and lend to a higher morbidity and/or mortality risk. All material risks, benefits, and reasonable alternatives including postponing the procedure were discussed. The patient DOES wish to proceed with the procedure at this time.           Specimens:   ID Type Source Tests Collected by Time Destination 1 : distal esophagus Swab Esophagus CULTURE, ANAEROBIC AND AEROBIC Vanessa Cid MD 12/21/2020 1116    2 : proximal esophagus Swab Esophagus CULTURE, ANAEROBIC AND AEROBIC Vanessa Cid MD 12/21/2020 1117    A : stomach bx Tissue Stomach SURGICAL PATHOLOGY Vanessa Cid MD 12/21/2020 1113        Electronically signed by Vanessa Cid MD on 12/21/2020 at 11:20 AM

## 2020-12-21 NOTE — H&P
History and Physical    Pt Name: Shante Landry  MRN: [id number]  YOB: 1960  Date of evaluation: 12/21/2020  Primary Care Physician: Barb Dumont MD    SUBJECTIVE:   History of Chief Complaint:    Shante Landry is a 56 y.o. male who is scheduled today for EGD. He complains of swallowing difficulties for a few months now. He reports history of resection of benign growths around vocal cords in 2007. Hx GERD. Allergies  is allergic to pcn [penicillins]. Medications  Prior to Admission medications    Medication Sig Start Date End Date Taking?  Authorizing Provider   omeprazole (PRILOSEC) 20 MG delayed release capsule Take 20 mg by mouth daily   Yes Historical Provider, MD   valsartan-hydrochlorothiazide (DIOVAN-HCT) 160-12.5 MG per tablet TAKE ONE TABLET BY MOUTH EVERY DAY 12/17/20  Yes Barb Dumont MD   pravastatin (PRAVACHOL) 40 MG tablet TAKE ONE TABLET BY MOUTH EVERY DAY 12/17/20  Yes Barb Dumont MD   amLODIPine (NORVASC) 10 MG tablet TAKE ONE TABLET BY MOUTH EVERY DAY 12/17/20  Yes Barb Dumont MD   potassium chloride (KLOR-CON M) 20 MEQ extended release tablet TAKE ONE TABLET BY MOUTH EVERY DAY 11/2/20  Yes Barb Dumont MD   spironolactone (ALDACTONE) 50 MG tablet TAKE ONE TABLET BY MOUTH EVERY DAY 11/2/20  Yes Barb Dumont MD   loratadine (CLARITIN) 10 MG tablet Take 1 tablet by mouth daily 8/12/20  Yes Barb Dumont MD   fluticasone (FLONASE) 50 MCG/ACT nasal spray 1 spray by Each Nostril route daily 7/17/20  Yes Barb Dumont MD   tamsulosin (FLOMAX) 0.4 MG capsule Take 0.4 mg by mouth daily   Yes Tonny Marks MD   azelastine (ASTELIN) 0.1 % nasal spray 1 spray by Nasal route 2 times daily Use in each nostril as directed 7/17/20   Barb Dumont MD     Past Medical History has a past medical history of BPH (benign prostatic hyperplasia), Class 2 severe obesity due to excess calories with serious comorbidity in adult [facility]), Dysphagia, Erectile dysfunction, GERD (gastroesophageal reflux disease), Hiatal hernia, Hyperlipidemia, Hypertension, YUMIKO on CPAP, PONV (postoperative nausea and vomiting), and Unspecified sleep apnea. Past Surgical History   has a past surgical history that includes Throat surgery (); hernia repair (); tumor removal (Right, 2016); Endoscopy, colon, diagnostic; Colonoscopy; skin biopsy (Bilateral, 2018); and pr destr penis lesn,simpl,surg excis (N/A, 2018). Social History   reports that he has never smoked. He has never used smokeless tobacco.   reports current alcohol use. reports no history of drug use. Marital Status    Children   Occupation retired   Family History  Family Status   Relation Name Status    Mother   at age 58   Neli Me Father  Alive     family history includes Heart Disease in his mother. OBJECTIVE:   VITALS:  height is 6' (1.829 m) and weight is 255 lb (115.7 kg). His infrared temperature is 98.7 °F (37.1 °C). His blood pressure is 139/70 and his pulse is 88. His respiration is 24 and oxygen saturation is 99%. CONSTITUTIONAL:Alert and orientated to person, place and time. No acute distress. Friendly. Quiet flat affect. Very pleasant. SKIN:  Warm & dry, no rashes on exposed skin  HEENT: HEAD: Normocephalic, atraumatic        EYES:  PERRL, EOMs intact, conjunctiva clear, wearing glasses. EARS:  Equal bilaterally, no edema or thickening, skin is intact without lumps or lesions. No discharge. NOSE:  Nares patent, septum midline, no rhinorrhea      MOUTH/THROAT:  Mucous membranes moist, tongue is pink, uvula midline, full dentures   NECK:  Supple, no lymphadenopathy, full ROM  LUNGS: Respirations even and non-labored.  Clear to auscultation bilaterally, no wheezes/rales/rhonchi

## 2020-12-21 NOTE — ANESTHESIA PRE PROCEDURE
Department of Anesthesiology  Preprocedure Note       Name:  Asael Steele   Age:  56 y.o.  :  1960                                          MRN:  [id number]         Date:  2020      Surgeon: Darrell White):  Maritna Urrutia MD    Procedure: Procedure(s):  EGD ESOPHAGOGASTRODUODENOSCOPY    Medications prior to admission:   Prior to Admission medications    Medication Sig Start Date End Date Taking? Authorizing Provider   omeprazole (PRILOSEC) 20 MG delayed release capsule Take 20 mg by mouth daily   Yes Historical Provider, MD   valsartan-hydrochlorothiazide (DIOVAN-HCT) 160-12.5 MG per tablet TAKE ONE TABLET BY MOUTH EVERY DAY 20  Yes Yonathan Preciado MD   pravastatin (PRAVACHOL) 40 MG tablet TAKE ONE TABLET BY MOUTH EVERY DAY 20  Yes Yonathan Preciado MD   amLODIPine (NORVASC) 10 MG tablet TAKE ONE TABLET BY MOUTH EVERY DAY 20  Yes Yonathan Preciado MD   potassium chloride (KLOR-CON M) 20 MEQ extended release tablet TAKE ONE TABLET BY MOUTH EVERY DAY 20  Yes Yonathan Preciado MD   spironolactone (ALDACTONE) 50 MG tablet TAKE ONE TABLET BY MOUTH EVERY DAY 20  Yes Yonathan Preciado MD   loratadine (CLARITIN) 10 MG tablet Take 1 tablet by mouth daily 20  Yes Yonathan Perciado MD   fluticasone (FLONASE) 50 MCG/ACT nasal spray 1 spray by Each Nostril route daily 20  Yes Yonathan Preciado MD   tamsulosin (FLOMAX) 0.4 MG capsule Take 0.4 mg by mouth daily   Yes Rachel Aguilera MD   azelastine (ASTELIN) 0.1 % nasal spray 1 spray by Nasal route 2 times daily Use in each nostril as directed 20   Yonathan Preciado MD       Current medications:    Current Facility-Administered Medications   Medication Dose Route Frequency Provider Last Rate Last Admin    0.9 % sodium chloride infusion   Intravenous Continuous Martina Urrutia  mL/hr at 20 1034 New Bag at 20 1034       Allergies:     Allergies   Allergen Reactions    Pcn [Penicillins]        Problem List:    Patient Active Problem List Diagnosis Code    BPH (benign prostatic hyperplasia) N40.0    Erectile dysfunction N52.9    GERD (gastroesophageal reflux disease) K21.9    Hypertension I10    Hyperlipidemia E78.5    Tendinopathy of rotator cuff M67.919    YUMIKO on CPAP G47.33, Z99.89    Stage 3 chronic kidney disease N18.30    Obesity (BMI 35.0-39.9 without comorbidity) E66.9    Anxiety hyperventilation F41.9, F45.8    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 9) [E66.9]    Dysphagia     Erectile dysfunction     Full dentures     GERD (gastroesophageal reflux disease)     Hiatal hernia     Hyperlipidemia     Hypertension     YUMIKO on CPAP     PONV (postoperative nausea and vomiting)     nauseated after last surgery    Unspecified sleep apnea     cpap nightly       Past Surgical History:        Procedure Laterality Date    COLONOSCOPY      ENDOSCOPY, COLON, DIAGNOSTIC      OK DESTR PENIS MARY JANE,SUPRIYA,SURG EXCIS N/A 7/31/2018    SUPRA PUBIC LESIONS BIOPSY EXCISION performed by Freddy Baer MD at [facility] 07/31/2018    SUPRA PUBIC LESIONS BIOPSY EXCISION (N/A )    THROAT SURGERY  2008    TUMOR REMOVAL Right 09/23/2016    [facility]  right thigh   (benign)       Social History:    Social History     Tobacco Use    Smoking status: Never Smoker    Smokeless tobacco: Never Used   Substance Use Topics    Alcohol use: Yes     Comment: ocassionally                                Counseling given: Not Answered      Vital Signs (Current):   Vitals:    12/21/20 1009   BP: 139/70   Pulse: 88   Resp: 24   Temp: 98.7 °F (37.1 °C)   TempSrc: Infrared   SpO2: 99%   Weight: 255 lb (115.7 kg)   Height: 6' (1.829 m) BP Readings from Last 3 Encounters:   12/21/20 139/70   12/10/20 137/77   11/30/20 116/60       NPO Status: Time of last liquid consumption: 2030                        Time of last solid consumption: 2000                        Date of last liquid consumption: 12/20/20                        Date of last solid food consumption: 12/20/20    BMI:   Wt Readings from Last 3 Encounters:   12/21/20 255 lb (115.7 kg)   12/10/20 264 lb (119.7 kg)   11/30/20 257 lb (116.6 kg)     Body mass index is 34.58 kg/m². CBC:   Lab Results   Component Value Date    WBC 5.7 09/11/2020    RBC 5.19 09/11/2020    HGB 14.2 09/11/2020    HCT 44.2 09/11/2020    MCV 85.2 09/11/2020    RDW 13.5 09/11/2020     09/11/2020       CMP:   Lab Results   Component Value Date     09/11/2020    K 4.1 09/11/2020     09/11/2020    CO2 22 09/11/2020    BUN 14 09/11/2020    CREATININE 1.23 09/11/2020    GFRAA >60 09/11/2020    LABGLOM 60 09/11/2020    GLUCOSE 106 09/11/2020    PROT 8.2 09/11/2020    CALCIUM 9.6 09/11/2020    BILITOT 0.90 09/11/2020    ALKPHOS 96 09/11/2020    AST 17 09/11/2020    ALT 14 09/11/2020       POC Tests: No results for input(s): POCGLU, POCNA, POCK, POCCL, POCBUN, POCHEMO, POCHCT in the last 72 hours. Coags: No results found for: PROTIME, INR, APTT    HCG (If Applicable): No results found for: PREGTESTUR, PREGSERUM, HCG, HCGQUANT     ABGs: No results found for: PHART, PO2ART, IUG1GFY, VFY5KJL, BEART, X3GOXKAA     Type & Screen (If Applicable):  No results found for: LABABO, LABRH    Drug/Infectious Status (If Applicable):  Lab Results   Component Value Date    HEPCAB NONREACTIVE 06/21/2017       COVID-19 Screening (If Applicable):   Lab Results   Component Value Date    COVID19 Not Detected 12/17/2020         Anesthesia Evaluation  Patient summary reviewed   history of anesthetic complications: PONV.   Airway: Mallampati: III        Dental: Pulmonary:normal exam  breath sounds clear to auscultation  (+) sleep apnea: on CPAP,                             Cardiovascular:    (+) hypertension:,         Rhythm: regular  Rate: normal                    Neuro/Psych:               GI/Hepatic/Renal:   (+) hiatal hernia, GERD:, renal disease:,           Endo/Other:                     Abdominal:           Vascular:                                        Anesthesia Plan      MAC     ASA 3       Induction: intravenous. Anesthetic plan and risks discussed with patient. Plan discussed with CRNA.             EKG NSr with inverted T wave      Mae Donovan MD   12/21/2020

## 2020-12-21 NOTE — PROGRESS NOTES
1124 returned post procedure skin warm color pink abdomen softly distended resting quietly with monitors continued in recovery areA

## 2020-12-22 LAB — SURGICAL PATHOLOGY REPORT: NORMAL

## 2020-12-29 ENCOUNTER — TELEPHONE (OUTPATIENT)
Dept: GASTROENTEROLOGY | Age: 60
End: 2020-12-29

## 2020-12-29 NOTE — TELEPHONE ENCOUNTER
Stephany from speech therapy at [facility] stating that in order to do speech therapy they will need the patient to complete a modified barium swallow study first.    Entering orders per [facility] so patient can start speech therapy.

## 2021-01-07 ENCOUNTER — TELEPHONE (OUTPATIENT)
Dept: GASTROENTEROLOGY | Age: 61
End: 2021-01-07

## 2021-01-07 DIAGNOSIS — A04.8 H. PYLORI INFECTION: Primary | ICD-10-CM

## 2021-01-07 RX ORDER — CLARITHROMYCIN 500 MG/1
500 TABLET, COATED ORAL 2 TIMES DAILY
Qty: 28 TABLET | Refills: 0 | Status: SHIPPED | OUTPATIENT
Start: 2021-01-07 | End: 2021-01-07 | Stop reason: SINTOL

## 2021-01-07 RX ORDER — METRONIDAZOLE 500 MG/1
500 TABLET ORAL 2 TIMES DAILY
Qty: 28 TABLET | Refills: 0 | Status: SHIPPED | OUTPATIENT
Start: 2021-01-07 | End: 2021-01-21

## 2021-01-07 RX ORDER — OMEPRAZOLE 20 MG/1
20 CAPSULE, DELAYED RELEASE ORAL DAILY
Qty: 30 CAPSULE | Refills: 0 | Status: SHIPPED | OUTPATIENT
Start: 2021-01-07 | End: 2021-06-08 | Stop reason: SDUPTHER

## 2021-01-07 RX ORDER — AMOXICILLIN 500 MG/1
1000 CAPSULE ORAL 2 TIMES DAILY
Qty: 56 CAPSULE | Refills: 0 | Status: SHIPPED | OUTPATIENT
Start: 2021-01-07 | End: 2021-01-21

## 2021-01-07 NOTE — TELEPHONE ENCOUNTER
Writer called patient. Informed him of h pylori and triple therapy that will need to be taken by patient. Patient stated understanding and agreed to  medications. Writer informed patient I would follow up with him in a month and remind him to stop PPI to complete breath test.  Patient thanked writer. Orders entered per .

## 2021-01-07 NOTE — TELEPHONE ENCOUNTER
Writer received call from [address] [place] [address]. They stated that the patients Flomax interacts with the Biaxin ordered. Pharmacy states that they have no record of patient having penicillin allergy. (writer did not order amoxicillin due to allergy)  Writer spoke with patient who stated he took amoxicillin last May and had no issues or reactions. States he was told he had a penicillin allergy as a child. Writer is ordering Amoxicillin. Pharmacy confirmed they would not fill other antibiotic. Patient notified of changes and agreed to taking medication.

## 2021-01-12 NOTE — TELEPHONE ENCOUNTER
Patient called and LVM stating that he had been taking the antibiotics for 4 days, but noticed yesterday his throat slightly swelling. Patient stated that it went away, and he took his medication again this morning without any problems. Writer told patient that if he notices this happening to take benadryl to help with inflammation. However, if this happens again, writer informed patient we will change his medication regimen. Told patient he could also shorten his treatment with antibiotics to 10 days. Patient stated that he felt fine today and is okay with moving forward with the medications. Patient instructed to call our office if he has any other side effects, also, if he notices his throat swelling to present to ED. Patient stated understanding and thanked writer for following up with him. Writer discussed with Aleyda Hickman. He agreed with all advice.

## 2021-01-15 ENCOUNTER — HOSPITAL ENCOUNTER (OUTPATIENT)
Dept: LAB | Age: 61
Setting detail: SPECIMEN
Discharge: HOME | End: 2021-01-15
Payer: COMMERCIAL

## 2021-01-15 DIAGNOSIS — Z01.818 PREOP TESTING: Primary | ICD-10-CM

## 2021-01-15 PROCEDURE — U0005 INFEC AGEN DETEC AMPLI PROBE: HCPCS

## 2021-01-15 PROCEDURE — U0003 INFECTIOUS AGENT DETECTION BY NUCLEIC ACID (DNA OR RNA); SEVERE ACUTE RESPIRATORY SYNDROME CORONAVIRUS 2 (SARS-COV-2) (CORONAVIRUS DISEASE [COVID-19]), AMPLIFIED PROBE TECHNIQUE, MAKING USE OF HIGH THROUGHPUT TECHNOLOGIES AS DESCRIBED BY CMS-2020-01-R: HCPCS

## 2021-01-17 LAB
SARS-COV-2, RAPID: NORMAL
SARS-COV-2: NORMAL
SARS-COV-2: NOT DETECTED
SOURCE: NORMAL

## 2021-01-19 ENCOUNTER — HOSPITAL ENCOUNTER (OUTPATIENT)
Dept: GENERAL RADIOLOGY | Age: 61
Discharge: HOME | End: 2021-01-21
Payer: COMMERCIAL

## 2021-01-19 DIAGNOSIS — R13.10 DYSPHAGIA, UNSPECIFIED TYPE: ICD-10-CM

## 2021-01-19 PROCEDURE — 74230 X-RAY XM SWLNG FUNCJ C+: CPT

## 2021-01-19 PROCEDURE — 92611 MOTION FLUOROSCOPY/SWALLOW: CPT

## 2021-01-19 NOTE — PROCEDURES
INSTRUMENTAL SWALLOW REPORT  MODIFIED BARIUM SWALLOW    NAME: Evin Adkins   : 1960  MRN: [id number]       Date of Eval: 2021              Referring Diagnosis(es):      Past Medical History:  has a past medical history of BPH (benign prostatic hyperplasia), Class 2 severe obesity due to excess calories with serious comorbidity in adult [facility]), Dysphagia, Erectile dysfunction, Full dentures, GERD (gastroesophageal reflux disease), Hiatal hernia, Hyperlipidemia, Hypertension, YUMIKO on CPAP, PONV (postoperative nausea and vomiting), and Unspecified sleep apnea. Past Surgical History:  has a past surgical history that includes Throat surgery (); tumor removal (Right, 2016); Endoscopy, colon, diagnostic; Colonoscopy; skin biopsy (Bilateral, 2018); pr destr penis lesn,simpl,surg excis (N/A, 2018); and Upper gastrointestinal endoscopy (N/A, 2020). Current Diet Solid Consistency: Regular(Per pt. report he eatsmostly Pureed foods at this time as Purees goes down better than drier solids.)  Current Diet Liquid Consistency: Thin       Type of Study: Initial MBS      Patient Complaints/Reason for Referral:  Evin Adkins was referred for a MBS to assess the efficiency of his/her swallow function, assess for aspiration, and to make recommendations regarding safe dietary consistencies, effective compensatory strategies, and safe eating environment. Onset of problem:    Pt. Reports that he had onset of dysphagia a couple days after Thanksgiving. He reports some days are better than others and he eats mostly Pureed foods and does better with liquids. Pt. Reports when he eats his swallowing starts out better and then he feels like he has more difficulty at the meal progresses. The food feels thicker and harder to swallow as the meal progresses. Behavior/Cognition/Vision/Hearing:  Behavior/Cognition: Alert; Cooperative  Vision: Impaired  Hearing: Within functional limits    Impressions:   Patient presents with  safe swallow for Regular diet (Soft and Purees as needed) with thin liquids as evidenced by no observed aspiration noted with consistencies tested. Recommend small sips and bites, only feed when alert and awake and alternate liquids and solids. Given patients complaints sudden onset of dysphagia and possible fatigue with meals, ST would recommend Neurology consult to r/o neurological etiology for dysphagia, R/O Myasthenia Gravis. Treatment Dx and ICD 10: R13.1   Patient Position: Lateral and Patient Degrees: 90      Consistencies Administered: Dysphagia Soft and Bite-Sized (Dysphagia III); Reg solid; Dysphagia Pureed (Dysphagia I); Thin cup    Compensatory Swallowing Strategies Attempted: Alternate solids and liquids;Eat/Feed slowly;Upright as possible for all oral intake;Small bites/sips  Postural Changes and/or Swallow Maneuvers Trialed: Upright 90 degrees        Recommended Diet:  Solid consistency: Dysphagia Soft and Bite-Sized (Dysphagia III); Dysphagia Minced and Moist (Dysphagia II); Dysphagia Pureed (Dysphagia I) as tolerated by patient  Liquid consistency: Thin  Liquid administration via: Cup    Medication administration: PO    Safe Swallow Protocol:  Supervision: Independent  Compensatory Swallowing Strategies: Alternate solids and liquids;Eat/Feed slowly;Upright as possible for all oral intake;Small bites/sips     Recommendations/Treatment  Requires SLP Intervention: Yes        D/C Recommendations: Outpatient  Postural Changes and/or Swallow Maneuvers: Upright 90 degrees      Recommended Exercises:    Therapeutic Interventions: Diet tolerance monitoring;Rona;Pharyngeal exercises; Tongue base strengthening    Referral To: Neurology; Dysphagia evaluation    Education: Images and recommendations were reviewed with pt following this exam.   Patient Education: yes  Patient Education Response: Verbalizes understanding    Prognosis  Prognosis for safe diet advancement: good      Goals:    Long Term:     To Maximize safety with intake, optimize nutrition/hydration and minimize risk for aspiration. Short Term:     Goal 1: Recommend Neurology consult given patients complaints. Goal 2:  Recommend OP ST after Neurology consult    Oral Preparation / Oral Phase  Oral Phase: WFL    Oral Phase: Adequate mastication and oral manipulation. Pt. with piecemeal bolus movement with 4-5 small swallows with each bolus. Pharyngeal Phase  Pharyngeal Phase: WFL  Pharyngeal: Thin cup: WFL,  Puree/Soft Solid: 4-5 swallows per bite, no penetration and no aspiration with mild vallec and pyriform stasis decreased with multiple swallows and liquid wash. Cookie: No penetration and no aspiration with mod vallec and pyriform stasis decreased to mild with liquid wash. Dysphagia Outcome Severity Scale: Level 5: Mild dysphagia- Distant supervision. May need one diet consistency restricted  Penetration-Aspiration Scale (PAS): 1 - Material does not enter the airway        Esophageal Phase  Esophageal Screen: Impaired  Upper Esophageal Screen- Major Contributing Deficits  Reduced Cricopharyngeal Opening:  All      Upper Esophageal Screen: Pt. with C4C5 cervical osteophytes noted    Pain   Patient Currently in Pain: No         Therapy Time:   Individual Concurrent Group Co-treatment   Time In 0945         Time Out 1005         Minutes 20                   Stephany Angel, 1/19/2021, 12:05 PM

## 2021-01-22 ENCOUNTER — HOSPITAL ENCOUNTER (EMERGENCY)
Age: 61
Discharge: HOME | End: 2021-01-22
Attending: EMERGENCY MEDICINE
Payer: COMMERCIAL

## 2021-01-22 VITALS
BODY MASS INDEX: 33.86 KG/M2 | RESPIRATION RATE: 18 BRPM | WEIGHT: 250 LBS | DIASTOLIC BLOOD PRESSURE: 66 MMHG | SYSTOLIC BLOOD PRESSURE: 137 MMHG | HEART RATE: 83 BPM | TEMPERATURE: 98.7 F | HEIGHT: 72 IN | OXYGEN SATURATION: 100 %

## 2021-01-22 DIAGNOSIS — K21.00 GASTROESOPHAGEAL REFLUX DISEASE WITH ESOPHAGITIS WITHOUT HEMORRHAGE: Primary | ICD-10-CM

## 2021-01-22 PROCEDURE — 6360000002 HC RX W HCPCS: Performed by: EMERGENCY MEDICINE

## 2021-01-22 PROCEDURE — 99283 EMERGENCY DEPT VISIT LOW MDM: CPT

## 2021-01-22 RX ORDER — DEXAMETHASONE SODIUM PHOSPHATE 4 MG/ML
4 INJECTION, SOLUTION INTRA-ARTICULAR; INTRALESIONAL; INTRAMUSCULAR; INTRAVENOUS; SOFT TISSUE EVERY 6 HOURS
Status: DISCONTINUED | OUTPATIENT
Start: 2021-01-22 | End: 2021-01-22 | Stop reason: HOSPADM

## 2021-01-22 RX ADMIN — DEXAMETHASONE SODIUM PHOSPHATE 4 MG: 4 INJECTION, SOLUTION INTRAMUSCULAR; INTRAVENOUS at 20:33

## 2021-01-22 ASSESSMENT — PAIN SCALES - GENERAL: PAINLEVEL_OUTOF10: 0

## 2021-02-03 ENCOUNTER — HOSPITAL ENCOUNTER (OUTPATIENT)
Dept: MRI IMAGING | Facility: CLINIC | Age: 61
Discharge: HOME | End: 2021-02-05
Payer: COMMERCIAL

## 2021-02-03 DIAGNOSIS — R25.1 INVOLUNTARY TREMBLING: ICD-10-CM

## 2021-02-03 DIAGNOSIS — R25.1 DYSPHONIA OF ORGANIC TREMOR: ICD-10-CM

## 2021-02-03 DIAGNOSIS — R49.0 DYSPHONIA OF ORGANIC TREMOR: ICD-10-CM

## 2021-02-03 LAB — POC CREATININE: 1.3 MG/DL (ref 0.6–1.4)

## 2021-02-03 PROCEDURE — 6360000004 HC RX CONTRAST MEDICATION: Performed by: OTOLARYNGOLOGY

## 2021-02-03 PROCEDURE — 70553 MRI BRAIN STEM W/O & W/DYE: CPT

## 2021-02-03 PROCEDURE — 72141 MRI NECK SPINE W/O DYE: CPT

## 2021-02-03 PROCEDURE — A9579 GAD-BASE MR CONTRAST NOS,1ML: HCPCS | Performed by: OTOLARYNGOLOGY

## 2021-02-03 PROCEDURE — 82565 ASSAY OF CREATININE: CPT

## 2021-02-03 RX ADMIN — GADOTERIDOL 20 ML: 279.3 INJECTION, SOLUTION INTRAVENOUS at 09:42

## 2021-02-08 NOTE — TELEPHONE ENCOUNTER
Writer called patient to advise him to stop PPI and take breath test in 2 weeks. Writer also reminded patient of his follow up appointment 03/08/21. Patient stated understanding and thanked writer.

## 2021-02-15 ENCOUNTER — HOSPITAL ENCOUNTER (OUTPATIENT)
Age: 61
Discharge: HOME | End: 2021-02-15
Payer: COMMERCIAL

## 2021-02-15 DIAGNOSIS — A04.8 H. PYLORI INFECTION: ICD-10-CM

## 2021-02-15 PROCEDURE — 83013 H PYLORI (C-13) BREATH: CPT

## 2021-02-15 PROCEDURE — 83014 H PYLORI DRUG ADMIN: CPT

## 2021-02-16 LAB — H PYLORI BREATH TEST: NEGATIVE

## 2021-02-22 ENCOUNTER — VIRTUAL VISIT (OUTPATIENT)
Dept: PULMONOLOGY | Age: 61
End: 2021-02-22
Payer: COMMERCIAL

## 2021-02-22 DIAGNOSIS — G47.33 OSA (OBSTRUCTIVE SLEEP APNEA): Primary | ICD-10-CM

## 2021-02-22 PROCEDURE — G8427 DOCREV CUR MEDS BY ELIG CLIN: HCPCS | Performed by: INTERNAL MEDICINE

## 2021-02-22 PROCEDURE — 3017F COLORECTAL CA SCREEN DOC REV: CPT | Performed by: INTERNAL MEDICINE

## 2021-02-22 PROCEDURE — 99214 OFFICE O/P EST MOD 30 MIN: CPT | Performed by: INTERNAL MEDICINE

## 2021-02-22 ASSESSMENT — SLEEP AND FATIGUE QUESTIONNAIRES
HOW LIKELY ARE YOU TO NOD OFF OR FALL ASLEEP WHILE SITTING QUIETLY AFTER LUNCH WITHOUT ALCOHOL: 1
HOW LIKELY ARE YOU TO NOD OFF OR FALL ASLEEP IN A CAR, WHILE STOPPED FOR A FEW MINUTES IN TRAFFIC: 0
HOW LIKELY ARE YOU TO NOD OFF OR FALL ASLEEP WHILE LYING DOWN TO REST IN THE AFTERNOON WHEN CIRCUMSTANCES PERMIT: 2
HOW LIKELY ARE YOU TO NOD OFF OR FALL ASLEEP WHILE SITTING AND TALKING TO SOMEONE: 0

## 2021-02-22 NOTE — PROGRESS NOTES
2021    TELEHEALTH EVALUATION -- Audio/Visual (During AXXTE-17 public health emergency)    Patient and physician are located in their individual locations. This is visit is completed via [employer] application []/ Doxy. me[x] / Telephone []     HPI:    Gerardo Wyatt (:  1960) has requested an audio/video evaluation for the following concern(s):    Mr. Sudarshan Mao is known to have sleep apnea syndrome which is being treated with CPAP CPAP has been effective relieving the apnea improving his daytime symptoms however lately he has noted that he snores at night. Snoring at times loud and wake him up. He has also noted the snoring happens only when the patient remotely is open. He does tend to open his mouth at night he is using a nasal mask. Does have history of nasal stuffiness and sinusitis which is under good control now. He is using Flonase. He is also losing weight successfully. He was given a new machine which has been more effective than the previous one. He is known to have systemic hypertension which is under good control no angina no PND. None noted to have any diabetes. No history of any swelling of the feet DVT. Review of Systems    Prior to Visit Medications    Medication Sig Taking?  Authorizing Provider   omeprazole (PRILOSEC) 20 MG delayed release capsule Take 1 capsule by mouth daily Take 45 minutes prior to all other medications Yes Hang Ovalle MD   valsartan-hydrochlorothiazide (DIOVAN-HCT) 160-12.5 MG per tablet Mustapha Cool Yes Angie Bolton MD   pravastatin (PRAVACHOL) 40 MG tablet TAKE ONE TABLET BY MOUTH EVERY DAY Yes Angie Bolton MD   amLODIPine (NORVASC) 10 MG tablet TAKE ONE TABLET BY MOUTH EVERY DAY Yes Angie Bolton MD   potassium chloride (KLOR-CON M) 20 MEQ extended release tablet TAKE ONE TABLET BY MOUTH EVERY DAY Yes Angie Bolton MD   spironolactone (ALDACTONE) 50 MG tablet Mustapha Cool Yes Angie Bolton MD loratadine (CLARITIN) 10 MG tablet Take 1 tablet by mouth daily Yes Meron Morris MD   fluticasone (FLONASE) 50 MCG/ACT nasal spray 1 spray by Each Nostril route daily Yes Meron Morris MD   azelastine (ASTELIN) 0.1 % nasal spray 1 spray by Nasal route 2 times daily Use in each nostril as directed Yes Meron Morris MD   tamsulosin (FLOMAX) 0.4 MG capsule Take 0.4 mg by mouth daily Yes Sandip Gonzalez MD       Social History     Tobacco Use    Smoking status: Never Smoker    Smokeless tobacco: Never Used   Substance Use Topics    Alcohol use: Yes     Comment: ocassionally    Drug use: No            RECORD REVIEW: Previous medical records were reviewed at today's visit. Wt Readings from Last 3 Encounters:   01/22/21 250 lb (113.4 kg)   12/21/20 255 lb (115.7 kg)   12/10/20 264 lb (119.7 kg)       Results for orders placed or performed during the hospital encounter of 02/15/21   H. Pylori Breath Test   Result Value Ref Range    H Pylori Breath Test Negative Negative       Mri Cervical Spine Wo Contrast    Result Date: 2/3/2021  EXAMINATION: MRI OF THE CERVICAL SPINE WITHOUT CONTRAST 2/3/2021 8:12 am TECHNIQUE: Multiplanar multisequence MRI of the cervical spine was performed without the administration of intravenous contrast. COMPARISON: None HISTORY: ORDERING SYSTEM PROVIDED HISTORY: Dysphonia of organic tremor TECHNOLOGIST PROVIDED HISTORY: Reason for Exam: PROBLEMS SWALLOWING SINCE November 2020. PREV CT ON PACS. [facility] Acuity: Acute Type of Exam: Unknown FINDINGS: BONES/ALIGNMENT: There is normal alignment of the spine. The vertebral body heights are maintained. The bone marrow signal appears unremarkable. No acute fracture is identified. SPINAL CORD: No abnormal cord signal is seen. SOFT TISSUES: No paraspinal mass identified. C2-C3: Moderate left foraminal stenosis is identified due to uncovertebral joint hypertrophy and facet disease. C3-C4:  Moderate left foraminal stenosis identified due to uncovertebral joint hypertrophy and facet disease. Small central disc protrusion is identified. C4-C5: Small central disc protrusion is again identified. The right foramina is mildly stenotic due to uncovertebral joint hypertrophy and facet disease. C5-C6: Moderate-sized right paracentral disc protrusion. There is also mild compression of the cervical cord. Moderate central canal stenosis and moderate bilateral foraminal stenosis are identified at this level. C6-C7: Broad disc osteophyte complex results in moderate central canal stenosis. Severe left foraminal stenosis and moderate right foraminal stenosis are identified. C7-T1: There is no significant disc protrusion, spinal canal stenosis or neural foraminal narrowing. Moderate-sized broad-based right paracentral disc protrusion at C5-6 in mild compression of the cervical cord on the right-side. In addition, moderate central canal stenosis and moderate bilateral foraminal stenosis are identified at this level. Broad disc osteophyte complex at C6-C7 resulting in moderate central canal stenosis. Severe left foraminal stenosis and moderate right foraminal stenosis are additionally appreciated at this level. Small central disc protrusion at C4-C5 with associated mild right foraminal stenosis. Small central disc protrusion at C3-C4 with associated moderate left foraminal stenosis. Moderate left foraminal stenosis at C2-C3. Mri Brain W Wo Contrast    Result Date: 2/3/2021  EXAMINATION: MRI OF THE BRAIN WITHOUT AND WITH CONTRAST  2/3/2021 8:12 am TECHNIQUE: Multiplanar multisequence MRI of the head/brain was performed without and with the administration of intravenous contrast. COMPARISON: CT head 02/18/2014 HISTORY: ORDERING SYSTEM PROVIDED HISTORY: Dysphonia of organic tremor TECHNOLOGIST PROVIDED HISTORY: Reason for Exam: PROBLEMS SWALLOWING SINCE November 2020. PREV CT ON PACS.  HX OF GROWTH REMOVED FROM [facility] 2007 Acuity: [x]  No signs of cyanosis of the peripheral portions of extremities. [] Abnormal       Neurological:        [x] Normal cranial nerve (limited exam to video visit) [x] No focal weakness observed       [] Abnormal          Speech       [x] Normal   [] Abnormal     Skin:        [x] No rash on visible skin  [x] Normal  [] Abnormal     Psychiatric:       [x] Normal  [] Abnormal        [x] Normal Mood  [] Anxious appearing        Other Pertinent Exam Findings:       ASSESSMENT:  Obesity   obstructive sleep apnea syndrome  Hypertension  Difficulty in swallowing under care of GI  Acid reflux problem  Plan:   1. Advised the patient to start using chinstrap which should help nighttime snoring which is very likely due to open mouth. 2.   3. Continued effort to lose weight  4.   5. His blood pressure is under good control. 6.   7. Is not known to have any diabetes. 8.   9. There is no evidence of angina or PND  10.   11. He has not noted any pedal edema or thromboembolic process. 12.   13. No acute sinusitis. No history of any polyps. 14.   15. Not taking his Covid vaccine although he does have had flu vaccine. 16.   17. Patient will let me know if the chinstrap does not take care of the snoring. 18.   19.   20. An  electronic signature was used to authenticate this note. --Asael Barroso MD on 2/22/2021 at 11:17 AM    9}    Pursuant to the emergency declaration under the [facility] waiver authority and the [employer] and [employer] Act, this Virtual  Visit was conducted, with patient's consent, to reduce the patient's risk of exposure to COVID-19 and provide continuity of care for an established patient. Services were provided through a video synchronous discussion virtually to substitute for in-person clinic visit. _______________________________________________________________________________________________________________________________________________  FOR TELEPHONE VISITS PLEASE COMPLETE THE FOLLOWING      Consent:  He and/or health care decision maker is aware that that he may receive a bill for this telephone service, depending on his insurance coverage, and has provided verbal consent to proceed: Yes      I affirm this is a Patient Initiated Episode with an Established Patient who has not had a related appointment within my department in the past 7 days or scheduled within the next 24 hours.     Total Time: minutes: 21-30 minutes    Note: not billable if this call serves to triage the patient into an appointment for the relevant concern

## 2021-03-01 ENCOUNTER — OFFICE VISIT (OUTPATIENT)
Dept: INTERNAL MEDICINE CLINIC | Age: 61
End: 2021-03-01
Payer: COMMERCIAL

## 2021-03-01 VITALS
WEIGHT: 248 LBS | SYSTOLIC BLOOD PRESSURE: 116 MMHG | BODY MASS INDEX: 33.59 KG/M2 | DIASTOLIC BLOOD PRESSURE: 60 MMHG | TEMPERATURE: 97.9 F | HEART RATE: 70 BPM | RESPIRATION RATE: 16 BRPM | HEIGHT: 72 IN | OXYGEN SATURATION: 99 %

## 2021-03-01 DIAGNOSIS — F41.9 ANXIETY HYPERVENTILATION: ICD-10-CM

## 2021-03-01 DIAGNOSIS — Z99.89 OSA ON CPAP: ICD-10-CM

## 2021-03-01 DIAGNOSIS — I10 ESSENTIAL HYPERTENSION: ICD-10-CM

## 2021-03-01 DIAGNOSIS — G47.33 OSA ON CPAP: ICD-10-CM

## 2021-03-01 DIAGNOSIS — E78.2 MIXED HYPERLIPIDEMIA: ICD-10-CM

## 2021-03-01 DIAGNOSIS — N18.30 STAGE 3 CHRONIC KIDNEY DISEASE, UNSPECIFIED WHETHER STAGE 3A OR 3B CKD (HCC): ICD-10-CM

## 2021-03-01 DIAGNOSIS — F45.8 ANXIETY HYPERVENTILATION: ICD-10-CM

## 2021-03-01 DIAGNOSIS — N52.01 ERECTILE DYSFUNCTION DUE TO ARTERIAL INSUFFICIENCY: ICD-10-CM

## 2021-03-01 DIAGNOSIS — A04.8 POSITIVE HELICOBACTER PYLORI TEST: ICD-10-CM

## 2021-03-01 DIAGNOSIS — K21.9 GASTROESOPHAGEAL REFLUX DISEASE WITHOUT ESOPHAGITIS: Primary | ICD-10-CM

## 2021-03-01 DIAGNOSIS — M67.919 TENDINOPATHY OF ROTATOR CUFF, UNSPECIFIED LATERALITY: ICD-10-CM

## 2021-03-01 DIAGNOSIS — J30.89 SEASONAL ALLERGIC RHINITIS DUE TO OTHER ALLERGIC TRIGGER: ICD-10-CM

## 2021-03-01 DIAGNOSIS — E66.9 OBESITY (BMI 35.0-39.9 WITHOUT COMORBIDITY): ICD-10-CM

## 2021-03-01 DIAGNOSIS — N40.0 BENIGN PROSTATIC HYPERPLASIA WITHOUT LOWER URINARY TRACT SYMPTOMS: ICD-10-CM

## 2021-03-01 DIAGNOSIS — R13.12 OROPHARYNGEAL DYSPHAGIA: ICD-10-CM

## 2021-03-01 PROCEDURE — G8417 CALC BMI ABV UP PARAM F/U: HCPCS | Performed by: FAMILY MEDICINE

## 2021-03-01 PROCEDURE — 1036F TOBACCO NON-USER: CPT | Performed by: FAMILY MEDICINE

## 2021-03-01 PROCEDURE — 99214 OFFICE O/P EST MOD 30 MIN: CPT | Performed by: FAMILY MEDICINE

## 2021-03-01 PROCEDURE — 3017F COLORECTAL CA SCREEN DOC REV: CPT | Performed by: FAMILY MEDICINE

## 2021-03-01 PROCEDURE — G8484 FLU IMMUNIZE NO ADMIN: HCPCS | Performed by: FAMILY MEDICINE

## 2021-03-01 PROCEDURE — G8427 DOCREV CUR MEDS BY ELIG CLIN: HCPCS | Performed by: FAMILY MEDICINE

## 2021-03-01 ASSESSMENT — ENCOUNTER SYMPTOMS
ALLERGIC/IMMUNOLOGIC NEGATIVE: 1
GASTROINTESTINAL NEGATIVE: 1
BACK PAIN: 1
RESPIRATORY NEGATIVE: 1
EYES NEGATIVE: 1

## 2021-03-01 ASSESSMENT — PATIENT HEALTH QUESTIONNAIRE - PHQ9
SUM OF ALL RESPONSES TO PHQ QUESTIONS 1-9: 0
SUM OF ALL RESPONSES TO PHQ9 QUESTIONS 1 & 2: 0

## 2021-03-01 NOTE — PROGRESS NOTES
Subjective:      Patient ID: Vel Sorto is a 56 y.o. male. Hypertension  This is a chronic problem. The current episode started more than 1 month ago. The problem has been gradually improving since onset. The problem is controlled. Associated symptoms include anxiety. Risk factors for coronary artery disease include male gender, dyslipidemia, obesity and stress. Past treatments include angiotensin blockers, calcium channel blockers and diuretics. The current treatment provides moderate improvement. Compliance problems include psychosocial issues. Hypertensive end-organ damage includes kidney disease. Identifiable causes of hypertension include chronic renal disease. Review of Systems   Constitutional: Negative. HENT: Negative. Eyes: Negative. Respiratory: Negative. Cardiovascular: Negative. Gastrointestinal: Negative. Endocrine: Negative. Musculoskeletal: Positive for arthralgias and back pain. Skin: Negative. Allergic/Immunologic: Negative. Neurological: Negative. Hematological: Negative. Psychiatric/Behavioral: The patient is nervous/anxious. Past family and social history unremarkable. Diagnosis Orders   1. Gastroesophageal reflux disease without esophagitis     2. Benign prostatic hyperplasia without lower urinary tract symptoms     3. Erectile dysfunction due to arterial insufficiency     4. Essential hypertension     5. Mixed hyperlipidemia     6. Tendinopathy of rotator cuff, unspecified laterality     7. YUMIKO on CPAP     8. Stage 3 chronic kidney disease, unspecified whether stage 3a or 3b CKD  Basic Metabolic Panel   9. Obesity (BMI 35.0-39.9 without comorbidity)     10. Anxiety hyperventilation     11. Seasonal allergic rhinitis due to other allergic trigger     12. Oropharyngeal dysphagia     13. Positive Helicobacter pylori test           Objective:   Physical Exam  Vitals signs and nursing note reviewed.    Constitutional:       Appearance: He is well-developed. Comments: Obesity with sleep apnea on CPAP   HENT:      Head: Normocephalic and atraumatic. Right Ear: External ear normal.      Left Ear: External ear normal.      Nose: Nose normal.      Comments: Allergic allergic rhinitis  Eyes:      Conjunctiva/sclera: Conjunctivae normal.      Pupils: Pupils are equal, round, and reactive to light. Neck:      Musculoskeletal: Normal range of motion and neck supple. Cardiovascular:      Rate and Rhythm: Normal rate and regular rhythm. Heart sounds: Normal heart sounds. Comments: Hypertension  Pulmonary:      Effort: Pulmonary effort is normal.      Breath sounds: Normal breath sounds. Stridor present. Abdominal:      General: Bowel sounds are normal.      Palpations: Abdomen is soft. Comments: GERD  Recently treated for H. pylori by GI   Genitourinary:     Comments: CKD 3  BPH  Musculoskeletal: Normal range of motion. Comments: Degenerative polyarthropathy   Skin:     General: Skin is warm and dry. Neurological:      Mental Status: He is alert and oriented to person, place, and time. Deep Tendon Reflexes: Reflexes are normal and symmetric. Psychiatric:      Comments: Generalized anxiety disorder         Assessment:       Diagnosis Orders   1. Gastroesophageal reflux disease without esophagitis     2. Benign prostatic hyperplasia without lower urinary tract symptoms     3. Erectile dysfunction due to arterial insufficiency     4. Essential hypertension     5. Mixed hyperlipidemia     6. Tendinopathy of rotator cuff, unspecified laterality     7. YUMIKO on CPAP     8. Stage 3 chronic kidney disease, unspecified whether stage 3a or 3b CKD  Basic Metabolic Panel   9. Obesity (BMI 35.0-39.9 without comorbidity)     10. Anxiety hyperventilation     11. Seasonal allergic rhinitis due to other allergic trigger     12. Oropharyngeal dysphagia     13.  Positive Helicobacter pylori test             Plan:      56year-old anxious identified and corrected by editing.           Suze Gupta MD

## 2021-03-02 ENCOUNTER — HOSPITAL ENCOUNTER (OUTPATIENT)
Age: 61
Setting detail: SPECIMEN
Discharge: HOME | End: 2021-03-02
Payer: COMMERCIAL

## 2021-03-02 DIAGNOSIS — N18.30 STAGE 3 CHRONIC KIDNEY DISEASE, UNSPECIFIED WHETHER STAGE 3A OR 3B CKD (HCC): ICD-10-CM

## 2021-03-02 LAB
ANION GAP SERPL CALCULATED.3IONS-SCNC: 2 MMOL/L (ref 9–17)
BUN BLDV-MCNC: 12 MG/DL (ref 8–23)
BUN/CREAT BLD: ABNORMAL (ref 9–20)
CALCIUM SERPL-MCNC: 9.7 MG/DL (ref 8.6–10.4)
CHLORIDE BLD-SCNC: 101 MMOL/L (ref 98–107)
CO2: 30 MMOL/L (ref 20–31)
CREAT SERPL-MCNC: 0.97 MG/DL (ref 0.7–1.2)
GFR AFRICAN AMERICAN: >60 ML/MIN
GFR NON-AFRICAN AMERICAN: >60 ML/MIN
GFR SERPL CREATININE-BSD FRML MDRD: ABNORMAL ML/MIN/{1.73_M2}
GFR SERPL CREATININE-BSD FRML MDRD: ABNORMAL ML/MIN/{1.73_M2}
GLUCOSE BLD-MCNC: 89 MG/DL (ref 70–99)
POTASSIUM SERPL-SCNC: 3.9 MMOL/L (ref 3.7–5.3)
SODIUM BLD-SCNC: 133 MMOL/L (ref 135–144)

## 2021-03-08 ENCOUNTER — OFFICE VISIT (OUTPATIENT)
Dept: GASTROENTEROLOGY | Age: 61
End: 2021-03-08
Payer: COMMERCIAL

## 2021-03-08 VITALS
BODY MASS INDEX: 33.46 KG/M2 | WEIGHT: 247 LBS | SYSTOLIC BLOOD PRESSURE: 126 MMHG | DIASTOLIC BLOOD PRESSURE: 69 MMHG | HEIGHT: 72 IN | HEART RATE: 76 BPM

## 2021-03-08 DIAGNOSIS — R13.10 DYSPHAGIA, UNSPECIFIED TYPE: Primary | ICD-10-CM

## 2021-03-08 PROCEDURE — 1036F TOBACCO NON-USER: CPT | Performed by: INTERNAL MEDICINE

## 2021-03-08 PROCEDURE — G8484 FLU IMMUNIZE NO ADMIN: HCPCS | Performed by: INTERNAL MEDICINE

## 2021-03-08 PROCEDURE — G8427 DOCREV CUR MEDS BY ELIG CLIN: HCPCS | Performed by: INTERNAL MEDICINE

## 2021-03-08 PROCEDURE — G8417 CALC BMI ABV UP PARAM F/U: HCPCS | Performed by: INTERNAL MEDICINE

## 2021-03-08 PROCEDURE — 3017F COLORECTAL CA SCREEN DOC REV: CPT | Performed by: INTERNAL MEDICINE

## 2021-03-08 PROCEDURE — 99213 OFFICE O/P EST LOW 20 MIN: CPT | Performed by: INTERNAL MEDICINE

## 2021-03-08 ASSESSMENT — ENCOUNTER SYMPTOMS
GASTROINTESTINAL NEGATIVE: 1
TROUBLE SWALLOWING: 1
RESPIRATORY NEGATIVE: 1
ALLERGIC/IMMUNOLOGIC NEGATIVE: 1

## 2021-03-08 NOTE — PROGRESS NOTES
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 Patient treated for H pylori. Patient notes having troubles swallowing solids. He notes water goes down fine. He notes no worse since his last visit just not any better. Denies nausea or BM issues. Swallowing examination revealed that the patient had difficulty initiating swallowing and was referred to neurology for evaluation by speech-language pathology    HISTORY OF PRESENT ILLNESS: Mr.Ernest Dedrick Ricardo is a 56 y.o. 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  Patient reports that he had scrambled eggs and toast and since then he has been having difficulty swallowing localized to the oropharyngeal area. Denies any globus sensation. Denies any regurgitation or chest symptoms. No other GI symptoms. Patient reports that he is able to tolerate liquids with more thicker consistency currently which is improvement from previous. Has yet to try solid food due to fear of dysphagia.   Denies any neurological symptoms that might suggest a possible TIA or CVA.     2007--throat surgery-- resection of benign growth around vocal cords.      Smoker: none Drinking history: 10 months quit, social  Abdominal surgeries: none   Prior Colonoscopy: 2017--next in 10 yrs  Prior EGD: 2010 dysphagia symptoms at that time  FH of GI issues: none     12 days ago. Liquids can tolerate. Past Medical,Family, and Social History reviewed and does contribute to the patient presenting condition. Patient's PMH/PSH,SH,PSYCH Hx, MEDs, ALLERGIES, and ROS were all reviewed and updated in the appropriate sections. PAST MEDICAL HISTORY:  Past Medical History:   Diagnosis Date    BPH (benign prostatic hyperplasia)     Class 2 severe obesity due to excess calories with serious comorbidity in adult [facility])     Obesity (BMI 30.0-34. 9) [E66.9]    Dysphagia     Erectile dysfunction     Full dentures     GERD (gastroesophageal reflux disease)     Hiatal hernia     Hyperlipidemia     Hypertension     YUMIKO on CPAP     PONV (postoperative nausea and vomiting)     nauseated after last surgery    Unspecified sleep apnea     cpap nightly       Past Surgical History:   Procedure Laterality Date    COLONOSCOPY      ENDOSCOPY, COLON, DIAGNOSTIC      ND DESTR PENIS LESN,SIMPL,SURG EXCIS N/A 7/31/2018    SUPRA PUBIC LESIONS BIOPSY EXCISION performed by López Bocanegra MD at [address] Bilateral 07/31/2018    SUPRA PUBIC LESIONS BIOPSY EXCISION (N/A )    THROAT SURGERY  2008    TUMOR REMOVAL Right 09/23/2016    [facility]  right thigh   (benign)    UPPER GASTROINTESTINAL ENDOSCOPY N/A 12/21/2020    EGD BIOPSY performed by April Blevins MD at [address] [place] [address] St:    Current Outpatient Medications:     omeprazole (PRILOSEC) 20 MG delayed release capsule, Take 1 capsule by mouth daily Take 45 minutes prior to all other medications, Disp: 30 capsule, Rfl: 0    valsartan-hydrochlorothiazide (DIOVAN-HCT) 160-12.5 MG per tablet, TAKE ONE TABLET BY MOUTH EVERY DAY, Disp: 90 tablet, Rfl: 1    pravastatin (PRAVACHOL) 40 MG tablet, TAKE ONE TABLET BY MOUTH EVERY DAY, Disp: 90 tablet, Rfl: 1    amLODIPine (NORVASC) 10 MG tablet, TAKE ONE TABLET BY MOUTH EVERY DAY, Disp: 90 tablet, Rfl: 1    potassium chloride (KLOR-CON M) 20 MEQ extended release tablet, TAKE ONE TABLET BY MOUTH EVERY DAY, Disp: 90 tablet, Rfl: 0    spironolactone (ALDACTONE) 50 MG tablet, TAKE ONE TABLET BY MOUTH EVERY DAY, Disp: 90 tablet, Rfl: 0    loratadine (CLARITIN) 10 MG tablet, Take 1 tablet by mouth daily, Disp: 90 tablet, Rfl: 1    fluticasone (FLONASE) 50 MCG/ACT nasal spray, 1 spray by Each Nostril route daily, Disp: 2 Bottle, Rfl: 1    azelastine (ASTELIN) 0.1 % nasal spray, 1 spray by Nasal route 2 times daily Use in each nostril as directed, Disp: 2 Bottle, Rfl: 1    tamsulosin (FLOMAX) 0.4 MG capsule, Take 0.4 mg by mouth daily, Disp: , Rfl:     ALLERGIES:   Allergies   Allergen Reactions    Pcn [Penicillins]      Patient states he was told he had allergy as a child. Has taken penicillin related medications in past with no problems.        FAMILY HISTORY:       Problem Relation Age of Onset    Heart Disease Mother          SOCIAL HISTORY:   Social History     Socioeconomic History    Marital status:      Spouse name: Not on file    Number of children: Not on file    Years of education: Not on file    Highest education level: Not on file   Occupational History    Not on file   Social Needs    Financial resource strain: Not on file    Food insecurity     Worry: Not on file     Inability: Not on file    Transportation needs     Medical: Not on file     Non-medical: Not on file   Tobacco Use    Smoking status: Never Smoker    Smokeless tobacco: Never Used   Substance and Sexual Activity    Alcohol use: Not Currently    Drug use: No    Sexual activity: Not on file   Lifestyle    Physical activity     Days per week: Not on file     Minutes per session: Not on file    Stress: Not on file   Relationships    Social connections     Talks on phone: Not on file     Gets together: Not on file     Attends Hinduism service: Not on file     Active member of club or organization: Not on file     Attends meetings of clubs or organizations: Not on file     Relationship status: Not on file    Intimate partner violence     Fear of current or ex partner: Not on file     Emotionally abused: Not on file     Physically abused: Not on file     Forced sexual activity: Not on file   Other Topics Concern    Not on file   Social History Narrative    Not on file       REVIEW OF SYSTEMS: A 12-point review of systems was obtained and pertinent positives and negatives were listed below. REVIEW OF SYSTEMS:     Constitutional: No fever, no chills, no lethargy, no weakness. HEENT:  No headache, otalgia, itchy eyes, nasal discharge or sore throat. Cardiac:  No chest pain, dyspnea, orthopnea or PND. Chest:   No cough, phlegm or wheezing. Abdomen:      Detailed by MA   Neuro:  No focal weakness, abnormal movements or seizure like activity. Skin:   No rashes, no itching. :   No hematuria, no pyuria, no dysuria, no flank pain. Extremities:  No swelling or joint pains. ROS was otherwise negative    Review of Systems   Constitutional: Positive for appetite change. Negative for fatigue. HENT: Positive for trouble swallowing. Eyes: Positive for visual disturbance. Respiratory: Negative. Cardiovascular: Negative. Gastrointestinal: Negative. Endocrine: Negative. Genitourinary: Negative. Musculoskeletal: Negative. Skin: Negative. Allergic/Immunologic: Negative. Neurological: Negative. Hematological: Negative. Psychiatric/Behavioral: Negative. PHYSICAL EXAMINATION: Vital signs reviewed per the nursing documentation. There were no vitals taken for this visit. There is no height or weight on file to calculate BMI. Physical Exam    Physical Exam   Constitutional: Patient is oriented to person, place, and time.  Patient appears well-developed and well-nourished. HENT:   Head: Normocephalic and atraumatic. Eyes: Pupils are equal, round, and reactive to light. EOM are normal.   Neck: Normal range of motion. Neck supple. No JVD present. No tracheal deviation present. No thyromegaly present. Cardiovascular: Normal rate, regular rhythm, normal heart sounds and intact distal pulses. Pulmonary/Chest: Effort normal and breath sounds normal. No stridor. No respiratory distress. He has no wheezes. He has no rales. He exhibits no tenderness. Abdominal: Soft. Bowel sounds are normal. He exhibits no distension and no mass. There is no tenderness. There is no rebound and no guarding. No hernia. Musculoskeletal: Normal range of motion. Lymphadenopathy:    Patient has no cervical adenopathy. Neurological: Patient is alert and oriented to person, place, and time. Psychiatric: Patient has a normal mood and affect. Patient behavior is normal.       LABORATORY DATA: Reviewed  Lab Results   Component Value Date    WBC 5.7 09/11/2020    HGB 14.2 09/11/2020    HCT 44.2 09/11/2020    MCV 85.2 09/11/2020     09/11/2020     (L) 03/02/2021    K 3.9 03/02/2021     03/02/2021    CO2 30 03/02/2021    BUN 12 03/02/2021    CREATININE 0.97 03/02/2021    LABPROT 7.9 05/09/2012    LABALBU 4.4 09/11/2020    BILITOT 0.90 09/11/2020    ALKPHOS 96 09/11/2020    AST 17 09/11/2020    ALT 14 09/11/2020         Lab Results   Component Value Date    RBC 5.19 09/11/2020    HGB 14.2 09/11/2020    MCV 85.2 09/11/2020    MCH 27.4 09/11/2020    MCHC 32.1 09/11/2020    RDW 13.5 09/11/2020    MPV 10.2 09/11/2020    BASOPCT 1 07/20/2018    LYMPHSABS 1.60 07/20/2018    MONOSABS 0.40 07/20/2018    NEUTROABS 3.00 07/20/2018    EOSABS 0.00 07/20/2018    BASOSABS 0.00 07/20/2018         DIAGNOSTIC TESTING:     No results found. Assessment  No diagnosis found. 1.  STOMACH BIOPSIES:  MODERATE TO SEVERE CHRONIC ACTIVE GASTRITIS. POSITIVE FOR HELICOBACTER PYLORI.    2.  DISTAL ESOPHAGUS BIOPSY:  NORMAL SQUAMOUS MUCOSA. 3.  PROXIMAL ESOPHAGUS BIOPSY:  NORMAL SQUAMOUS MUCOSA    IMPRESSION: Korina Paz is a 56 y.o. 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  Patient reports that he had scrambled eggs and toast and since then he has been having difficulty swallowing localized to the oropharyngeal area. Denies any globus sensation. Denies any regurgitation or chest symptoms. No other GI symptoms. Patient reports that he is able to tolerate liquids with more thicker consistency currently which is improvement from previous. Has yet to try solid food due to fear of dysphagia. Denies any neurological symptoms that might suggest a possible TIA or CVA. 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 Esophageal biopsies were negative. Send for esophageal  Motility testing to further evaluate for dysmotility    2) Patient reports swallowing \"fatigue\" and nightly decline in function of OP swallowing capacity. Needs follow-up with neurological services at this may be most likely a normal skull issue    3) H. pylori associated gastritis-eradicated and treated.     Thank you for allowing me to participate in the care of Mr. Brad Delgado. For any further questions please do not hesitate to contact me. I have reviewed and agree with the ROS entered by the MA/LPN from today's encounter documented in a separate note. April Blevins MD, MPH   [facility]  Office #: [phone]    this note is created with the assistance of a speech recognition program.  While intending to generate a document that actually reflects the content of the visit, the document can still have some errors including those of syntax and sound a like substitutions which may escape proof reading. It such instances, actual meaning can be extrapolated by contextual diversion.

## 2021-03-13 ENCOUNTER — HOSPITAL ENCOUNTER (OUTPATIENT)
Dept: LAB | Age: 61
Setting detail: SPECIMEN
Discharge: HOME | End: 2021-03-13
Payer: COMMERCIAL

## 2021-03-13 DIAGNOSIS — Z01.818 PREOP TESTING: Primary | ICD-10-CM

## 2021-03-13 LAB
SARS-COV-2: NORMAL
SARS-COV-2: NOT DETECTED
SOURCE: NORMAL

## 2021-03-13 PROCEDURE — U0003 INFECTIOUS AGENT DETECTION BY NUCLEIC ACID (DNA OR RNA); SEVERE ACUTE RESPIRATORY SYNDROME CORONAVIRUS 2 (SARS-COV-2) (CORONAVIRUS DISEASE [COVID-19]), AMPLIFIED PROBE TECHNIQUE, MAKING USE OF HIGH THROUGHPUT TECHNOLOGIES AS DESCRIBED BY CMS-2020-01-R: HCPCS

## 2021-03-13 PROCEDURE — U0005 INFEC AGEN DETEC AMPLI PROBE: HCPCS

## 2021-03-14 ENCOUNTER — TELEPHONE (OUTPATIENT)
Dept: PRIMARY CARE CLINIC | Age: 61
End: 2021-03-14

## 2021-03-15 DIAGNOSIS — E83.51 HYPOCALCEMIA: ICD-10-CM

## 2021-03-15 RX ORDER — POTASSIUM CHLORIDE 20 MEQ/1
TABLET, EXTENDED RELEASE ORAL
Qty: 90 TABLET | Refills: 1 | Status: SHIPPED | OUTPATIENT
Start: 2021-03-15

## 2021-03-15 NOTE — TELEPHONE ENCOUNTER
Jose Clemente is calling to request a refill on the following medication(s):    Medication Request:  Requested Prescriptions     Pending Prescriptions Disp Refills    potassium chloride (KLOR-CON M) 20 MEQ extended release tablet [Pharmacy Med Name: potassium chloride ER 20 mEq tablet,extended release(part/cryst)] 90 tablet 0     Sig: TAKE ONE TABLET BY MOUTH EVERY DAY     Last filled 11/2/20 90 day no refill  Order pending    Last Visit Date (If Applicable):  4/9/0618    Next Visit Date:    6/1/2021

## 2021-03-17 ENCOUNTER — HOSPITAL ENCOUNTER (OUTPATIENT)
Age: 61
Setting detail: OUTPATIENT SURGERY
Discharge: HOME | End: 2021-03-17
Attending: INTERNAL MEDICINE | Admitting: INTERNAL MEDICINE
Payer: COMMERCIAL

## 2021-03-17 VITALS
TEMPERATURE: 98.3 F | RESPIRATION RATE: 18 BRPM | BODY MASS INDEX: 32.91 KG/M2 | SYSTOLIC BLOOD PRESSURE: 141 MMHG | DIASTOLIC BLOOD PRESSURE: 70 MMHG | HEIGHT: 72 IN | HEART RATE: 84 BPM | WEIGHT: 243 LBS | OXYGEN SATURATION: 100 %

## 2021-03-17 PROCEDURE — 2709999900 HC NON-CHARGEABLE SUPPLY: Performed by: INTERNAL MEDICINE

## 2021-03-17 PROCEDURE — 91010 ESOPHAGUS MOTILITY STUDY: CPT | Performed by: INTERNAL MEDICINE

## 2021-03-17 PROCEDURE — 3609015500 HC GASTRIC/DUODENAL MOTILITY &/OR MANOMETRY STUDY: Performed by: INTERNAL MEDICINE

## 2021-03-17 NOTE — PROGRESS NOTES
0745 am procedure explained to patient and questions answered. 4352 am patient placed in a sitting position, esophageal probe placed to right nare at 53 cm. Patient tolerated placement of esophageal motility probe without complaints of discomfort. Head of stretcher placed at 30 degrees and total 10 swallows completed. Patient tolerated procedure without complaints of discomfort. Patient instructed to not eat or drink for 30 minutes and to follow up with Dr Jovan El for study results.  Germain Duverney RN

## 2021-04-02 RX ORDER — VALSARTAN AND HYDROCHLOROTHIAZIDE 160; 12.5 MG/1; MG/1
TABLET, FILM COATED ORAL
Qty: 90 TABLET | Refills: 1 | OUTPATIENT
Start: 2021-04-02

## 2021-04-02 RX ORDER — PRAVASTATIN SODIUM 40 MG
TABLET ORAL
Qty: 90 TABLET | Refills: 1 | OUTPATIENT
Start: 2021-04-02

## 2021-04-02 NOTE — TELEPHONE ENCOUNTER
Augie Emmanuel is calling to request a refill on the following medication(s):    Medication Request:  Requested Prescriptions     Pending Prescriptions Disp Refills    pravastatin (PRAVACHOL) 40 MG tablet [Pharmacy Med Name: pravastatin 40 mg tablet] 90 tablet 1     Sig: TAKE ONE TABLET BY MOUTH EVERY DAY    valsartan-hydroCHLOROthiazide (DIOVAN-HCT) 160-12.5 MG per tablet [Pharmacy Med Name: valsartan 160 mg-hydrochlorothiazide 12.5 mg tablet] 90 tablet 1     Sig: TAKE ONE TABLET BY MOUTH EVERY DAY     Last filled 12/17/20 90 day 1 refill  Not due      Last Visit Date (If Applicable):  2/6/6155    Next Visit Date:    6/1/2021

## 2021-04-21 ENCOUNTER — OFFICE VISIT (OUTPATIENT)
Dept: GASTROENTEROLOGY | Age: 61
End: 2021-04-21
Payer: COMMERCIAL

## 2021-04-21 VITALS — SYSTOLIC BLOOD PRESSURE: 149 MMHG | WEIGHT: 248 LBS | DIASTOLIC BLOOD PRESSURE: 80 MMHG | BODY MASS INDEX: 33.63 KG/M2

## 2021-04-21 DIAGNOSIS — R13.12 OROPHARYNGEAL DYSPHAGIA: Primary | ICD-10-CM

## 2021-04-21 PROCEDURE — 1036F TOBACCO NON-USER: CPT | Performed by: INTERNAL MEDICINE

## 2021-04-21 PROCEDURE — G8427 DOCREV CUR MEDS BY ELIG CLIN: HCPCS | Performed by: INTERNAL MEDICINE

## 2021-04-21 PROCEDURE — 99213 OFFICE O/P EST LOW 20 MIN: CPT | Performed by: INTERNAL MEDICINE

## 2021-04-21 PROCEDURE — 3017F COLORECTAL CA SCREEN DOC REV: CPT | Performed by: INTERNAL MEDICINE

## 2021-04-21 PROCEDURE — G8417 CALC BMI ABV UP PARAM F/U: HCPCS | Performed by: INTERNAL MEDICINE

## 2021-04-21 ASSESSMENT — ENCOUNTER SYMPTOMS
GASTROINTESTINAL NEGATIVE: 1
RESPIRATORY NEGATIVE: 1
ALLERGIC/IMMUNOLOGIC NEGATIVE: 1
TROUBLE SWALLOWING: 1

## 2021-04-21 NOTE — PROGRESS NOTES
GI FOLLOW UP    INTERVAL HISTORY:     Oropharyngeal dysphagia  Motility testing was unremarkable  Continues to have oropharyngeal dysphagia with difficulty initiating swallowing  Negative for structural causes on upper endoscopy    Chief Complaint   Patient presents with    Follow-up     6 wk follow up Motility Study.  Dysphagia     Patient states still having trouble swallowing- daily. States he is now eating soft foods- avoiding solid foods. 1. Oropharyngeal dysphagia          HISTORY OF PRESENT ILLNESS: Manny Hutton is a 56 y. o. 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  Patient reports that he had scrambled eggs and toast and since then he has been having difficulty swallowing localized to the oropharyngeal area.  Denies any globus sensation.  Denies any regurgitation or chest symptoms.  No other GI symptoms.  Patient reports that he is able to tolerate liquids with more thicker consistency currently which is improvement from previous.  Has yet to try solid food due to fear of dysphagia.  Denies any neurological symptoms that might suggest a possible TIA or CVA.     2007--throat surgery-- resection of benign growth around vocal cords.      Smoker: none   Drinking history: 10 months quit, social  Abdominal surgeries: none   Prior Colonoscopy: 2017--next in 10 yrs  Prior EGD: 2010 dysphagia symptoms at that time  FH of GI issues: none     12 days ago. Liquids can tolerate.     Past Medical,Family, and Social History reviewed and does contribute to the patient presenting condition.     Patient's PMH/PSH,SH,PSYCH Hx, MEDs, ALLERGIES, and ROS were all reviewed and updated in the appropriate sections. PAST MEDICAL HISTORY:  Past Medical History:   Diagnosis Date    BPH (benign prostatic hyperplasia)     Class 2 severe obesity due to excess calories with serious comorbidity in adult [facility])     Obesity (BMI 30.0-34. 9) [E66.9]    Dysphagia     Erectile dysfunction     Full dentures     GERD (gastroesophageal reflux disease)     Hiatal hernia     Hyperlipidemia     Hypertension     YUMIKO on CPAP     PONV (postoperative nausea and vomiting)     nauseated after last surgery    Unspecified sleep apnea     cpap nightly       Past Surgical History:   Procedure Laterality Date    COLONOSCOPY      ENDOSCOPY, COLON, DIAGNOSTIC      ESOPHAGEAL MOTILITY STUDY N/A 3/17/2021    ESOPHAGEAL MOTILITY STUDY performed by Navjot Marcus MD at [address] [place] [address] N/A 7/31/2018    SUPRA PUBIC LESIONS BIOPSY EXCISION performed by Stephie Dobbins MD at [facility] 07/31/2018    SUPRA PUBIC LESIONS BIOPSY EXCISION (N/A )    THROAT SURGERY  2008    TUMOR REMOVAL Right 09/23/2016    [facility]  right thigh   (benign)    UPPER GASTROINTESTINAL ENDOSCOPY N/A 12/21/2020    EGD BIOPSY performed by Navjot Marcus MD at [facility]       CURRENT MEDICATIONS:    Current Outpatient Medications:     potassium chloride (KLOR-CON M) 20 MEQ extended release tablet, TAKE ONE TABLET BY MOUTH EVERY DAY, Disp: 90 tablet, Rfl: 1    omeprazole (PRILOSEC) 20 MG delayed release capsule, Take 1 capsule by mouth daily Take 45 minutes prior to all other medications, Disp: 30 capsule, Rfl: 0    valsartan-hydrochlorothiazide (DIOVAN-HCT) 160-12.5 MG per tablet, TAKE ONE TABLET BY MOUTH EVERY DAY, Disp: 90 tablet, Rfl: 1    pravastatin (PRAVACHOL) 40 MG tablet, TAKE ONE TABLET BY MOUTH EVERY DAY, Disp: 90 tablet, Rfl: 1    amLODIPine (NORVASC) 10 MG tablet, TAKE ONE TABLET BY MOUTH EVERY partner violence     Fear of current or ex partner: Not on file     Emotionally abused: Not on file     Physically abused: Not on file     Forced sexual activity: Not on file   Other Topics Concern    Not on file   Social History Narrative    Not on file       REVIEW OF SYSTEMS: A 12-point review of systems was obtained and pertinent positives and negatives were listed below. REVIEW OF SYSTEMS:     Constitutional: No fever, no chills, no lethargy, no weakness. HEENT:  No headache, otalgia, itchy eyes, nasal discharge or sore throat. Cardiac:  No chest pain, dyspnea, orthopnea or PND. Chest:   No cough, phlegm or wheezing. Abdomen:      Detailed by MA   Neuro:  No focal weakness, abnormal movements or seizure like activity. Skin:   No rashes, no itching. :   No hematuria, no pyuria, no dysuria, no flank pain. Extremities:  No swelling or joint pains. ROS was otherwise negative    Review of Systems   Constitutional: Positive for appetite change. Negative for fatigue. HENT: Positive for trouble swallowing. Eyes: Positive for visual disturbance. Respiratory: Negative. Cardiovascular: Negative. Gastrointestinal: Negative. Endocrine: Negative. Genitourinary: Negative. Musculoskeletal: Negative. Skin: Negative. Allergic/Immunologic: Negative. Neurological: Negative. Hematological: Negative. Psychiatric/Behavioral: Negative. All other systems reviewed and are negative. PHYSICAL EXAMINATION: Vital signs reviewed per the nursing documentation. BP (!) 149/80   Wt 248 lb (112.5 kg)   BMI 33.63 kg/m²   Body mass index is 33.63 kg/m². Physical Exam    Physical Exam   Constitutional: Patient is oriented to person, place, and time. Patient appears well-developed and well-nourished. HENT:   Head: Normocephalic and atraumatic. Eyes: Pupils are equal, round, and reactive to light. EOM are normal.   Neck: Normal range of motion. Neck supple. No JVD present.  No tracheal deviation present. No thyromegaly present. Cardiovascular: Normal rate, regular rhythm, normal heart sounds and intact distal pulses. Pulmonary/Chest: Effort normal and breath sounds normal. No stridor. No respiratory distress. He has no wheezes. He has no rales. He exhibits no tenderness. Abdominal: Soft. Bowel sounds are normal. He exhibits no distension and no mass. There is no tenderness. There is no rebound and no guarding. No hernia. Musculoskeletal: Normal range of motion. Lymphadenopathy:    Patient has no cervical adenopathy. Neurological: Patient is alert and oriented to person, place, and time. Psychiatric: Patient has a normal mood and affect. Patient behavior is normal.       LABORATORY DATA: Reviewed  Lab Results   Component Value Date    WBC 5.7 09/11/2020    HGB 14.2 09/11/2020    HCT 44.2 09/11/2020    MCV 85.2 09/11/2020     09/11/2020     (L) 03/02/2021    K 3.9 03/02/2021     03/02/2021    CO2 30 03/02/2021    BUN 12 03/02/2021    CREATININE 0.97 03/02/2021    LABPROT 7.9 05/09/2012    LABALBU 4.4 09/11/2020    BILITOT 0.90 09/11/2020    ALKPHOS 96 09/11/2020    AST 17 09/11/2020    ALT 14 09/11/2020         Lab Results   Component Value Date    RBC 5.19 09/11/2020    HGB 14.2 09/11/2020    MCV 85.2 09/11/2020    MCH 27.4 09/11/2020    MCHC 32.1 09/11/2020    RDW 13.5 09/11/2020    MPV 10.2 09/11/2020    BASOPCT 1 07/20/2018    LYMPHSABS 1.60 07/20/2018    MONOSABS 0.40 07/20/2018    NEUTROABS 3.00 07/20/2018    EOSABS 0.00 07/20/2018    BASOSABS 0.00 07/20/2018         DIAGNOSTIC TESTING:     No results found. Assessment  1. Oropharyngeal dysphagia          IMPRESSION: Mr.Ernest DORIE Hutton is a 56 y. o. 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 It such instances, actual meaning can be extrapolated by contextual diversion.

## 2021-04-21 NOTE — OP NOTE
Esophageal motility testing report      Patient: Sheree Davis  YOB: 1960  MRN: [id number]    Date of Procedure: 3/17/2021    Pre-Op Diagnosis: DYSPHAGIA    Post-Op Diagnosis: Oropharyngeal dysphagia nonspecified       Procedure(s):  ESOPHAGEAL MOTILITY STUDY    Surgeon(s):  Edie Denver, MD    Assistant:   First Assistant: Annamaria Doyle RN    Anesthesia: None    No bleeding    Complications: No complications    Specimens:   * No specimens in log *    Implants:  * No implants in log *      Drains: * No LDAs found *    Refindings:  Number of swallows evaluated equal 10  DCI measuring at average of 362.1  Intrabolus pressure within normal limits  70% failed swallowing, incomplete swallowing during the oral pharyngeal phase  No significant premature contractions  Incomplete bolus clearance approximately 80%  No rapid contractions  Pharyngeal and upper esophageal sphincter motility appeared to be normal  No evidence of achalasia however some evidence of atonic states in the esophagus  This appears to be a neuromuscular issue with lack of contractions initiated in the oral pharyngeal phase    Suspected neuromuscular etiology with intermittent atonic states and failure to initiate swallowing on initial phases. Recommendation:  -Speech-language pathology with speech therapy recommended. Possible need for neurological evaluation given current findings. May need brainstem evaluation by neurology.       Electronically signed by Edie Denver, MD on 4/21/2021 at 7:55 PM

## 2021-05-06 ENCOUNTER — HOSPITAL ENCOUNTER (OUTPATIENT)
Dept: SPEECH THERAPY | Age: 61
Setting detail: THERAPIES SERIES
Discharge: HOME | End: 2021-05-06
Payer: COMMERCIAL

## 2021-05-06 PROCEDURE — 92610 EVALUATE SWALLOWING FUNCTION: CPT

## 2021-05-06 ASSESSMENT — PAIN SCALES - GENERAL
PAINLEVEL_OUTOF10: 0
PAINLEVEL_OUTOF10: 0

## 2021-05-06 NOTE — PROGRESS NOTES
Speech Language Pathology  Facility/Department: [facility] SPEECH THERAPY   CLINICAL BEDSIDE SWALLOW EVALUATION    NAME: Brooke Causey  : 1960  MRN: [id number]    ADMISSION DATE: 2021  ADMITTING DIAGNOSIS: has BPH (benign prostatic hyperplasia); Erectile dysfunction; GERD (gastroesophageal reflux disease); Hypertension; Hyperlipidemia; Tendinopathy of rotator cuff; YUMIKO on CPAP; Stage 3 chronic kidney disease; Obesity (BMI 35.0-39.9 without comorbidity); Anxiety hyperventilation; Benign neoplasm of lower extremity; Seasonal allergic rhinitis; Oropharyngeal dysphagia; and Positive Helicobacter pylori test on their problem list.    Date of Eval: 2021  Evaluating Therapist: Ollie Robledo    Current Diet level:  Current Diet : Regular  Current Liquid Diet : Thin    Primary Complaint: Severo Imus is a 56year old male who arrived early for his appointment unaccompanied. Pt describes a sudden onset of dysphagia symptoms a few days after 2020. Pt describes symptoms as difficulty with chewing and feelings of strain in anterior throat with swallowing all consistencies. Pt had an outpatient MBSS at [facility] in 2021, and a Regular diet with thin liquids and consultation with neurology was recommended. Pt reports he has modified all food to be pureed, specifically eating boost nutrition shakes, pureed oatmeal, and pureed homemade soup with vegetables. Pt reports decreased confidence in trying new foods due to dysphagia symptoms. Pt also reports possible fatigue at end of meals. Pain:  Pain Assessment  Pain Assessment: 0-10  Pain Level: 0    Reason for Referral  Brooke Causey was referred for a bedside swallow evaluation to assess the efficiency of his swallow function, identify signs and symptoms of aspiration and make recommendations regarding safe dietary consistencies, effective compensatory strategies, and safe eating environment.     Impression  ICD10: R13.12 Oropharyngeal dysphagia    Patient continues to present with probable safe swallow for Regular diet with thin liquids as evidenced by no overt s/s of aspiration noted with consistencies tested. Pt observed with mild oral stage dysphagia characterized by piecemeal swallow of consistencies, with 2x swallows per bite of cracker; 3-4x swallows per bite of puree; 2x swallow per sip of nectar thick; and 2x swallow per sip of thin liquid. Recommend small sips and bites, eat/drink slowly, alternate bites/sips, several small meals throughout the day, only feed when alert and awake and upright at 90 degrees for all PO intake. Should s/s of aspiration occur, recommend repeat Modified Barium Swallow Study should they occur. Pt completed oral mechanism examination, and observed with +R labial and R-adjacent buccal tremor when smiling, possibly indicating involvement of buccinator and orbicularis oris muscles, both innervated by buccal branch of cranial nerve VII: facial. Pt observed with +lingual tremor with tongue protrusion from oral cavity, and pt not able to elevate tongue tip while protruded, despite cues. Together, tremor with lingual protrusion and inability to elevate tongue tip may suggest involvement of genioglossus, styloglossus, and intrinsic lingual muscles, all innervated by cranial nerve XII: hypoglossal. No differences in labial/lingual strength or sensation were observed by SLP or reported by pt. Recommend full neurology workup to r/o/confirm involvement of cranial nerves VII and XII in observed mild oral stage dysphagia. Dysphagia Diagnosis: Swallow function appears grossly intact;Mild oral stage dysphagia  Dysphagia Outcome Severity Scale: Level 6: Within functional limits/Modified independence     Treatment Plan  Requires SLP Intervention: No     D/C Recommendations: No therapy recommended at discharge.     Recommended Diet and Intervention  Diet Solids Recommendation: Regular  Liquid Consistency Recommendation: Thin  Recommended Form of Meds: PO     Therapeutic Interventions: Patient/Family education    Compensatory Swallowing Strategies  Compensatory Swallowing Strategies: Alternate solids and liquids;Eat/Feed slowly;Upright as possible for all oral intake;Small bites/sips    General  Chart Reviewed: Yes  Behavior/Cognition: Alert; Cooperative;Pleasant mood  Temperature Spikes Noted: No  Respiratory Status: Room air  O2 Device: None (Room air)  Communication Observation: Functional  Follows Directions: Complex  Dentition: Adequate  Patient Positioning: Upright in chair  Baseline Vocal Quality: Normal  Volitional Cough: Strong  Consistencies Administered: Reg solid; Dysphagia Soft and Bite-Sized (Dysphagia III); Dysphagia Pureed (Dysphagia I); Nectar - cup; Thin - cup    Pain Level: 0    Vision/Hearing  Vision  Vision: Within Functional Limits  Hearing  Hearing: Within functional limits    Oral Motor Deficits  Oral/Motor  Oral Motor: Exceptions to WFL(R-labial tremor noted with smile; lingual tremor with protrusion)  Lingual ROM: Reduced left; Reduced right(not able to elevate tongue tip)    Oral Phase Dysfunction  Oral Phase  Oral Phase: WFL  Oral Phase  Oral Phase - Comment: Pt observed with piecemeal swallow: 3-4x swallow per bite of puree; 2x swallow per sip nectar; 2x swallow per sip thin liquid     Indicators of Pharyngeal Phase Dysfunction   Pharyngeal Phase  Pharyngeal Phase: WFL  Pharyngeal Phase   Pharyngeal: No overt s/s of aspiration observed with consistencies tested.     Prognosis  Prognosis  Prognosis for safe diet advancement: fair  Individuals consulted  Consulted and agree with results and recommendations: Patient    Education  Patient Education: yes  Patient Education Response: Verbalizes understanding             Therapy Time  SLP Individual Minutes  Time In: 3969  Time Out: 8444  Minutes: CARLOTA Lyons [address] [facility]    5/6/2021 1:27 PM

## 2021-06-01 ENCOUNTER — OFFICE VISIT (OUTPATIENT)
Dept: INTERNAL MEDICINE CLINIC | Age: 61
End: 2021-06-01
Payer: COMMERCIAL

## 2021-06-01 ENCOUNTER — HOSPITAL ENCOUNTER (OUTPATIENT)
Age: 61
Setting detail: SPECIMEN
Discharge: HOME | End: 2021-06-01
Payer: COMMERCIAL

## 2021-06-01 VITALS
HEIGHT: 72 IN | BODY MASS INDEX: 34.13 KG/M2 | RESPIRATION RATE: 16 BRPM | HEART RATE: 66 BPM | DIASTOLIC BLOOD PRESSURE: 68 MMHG | TEMPERATURE: 96.6 F | SYSTOLIC BLOOD PRESSURE: 122 MMHG | WEIGHT: 252 LBS | OXYGEN SATURATION: 99 %

## 2021-06-01 DIAGNOSIS — E78.2 MIXED HYPERLIPIDEMIA: ICD-10-CM

## 2021-06-01 DIAGNOSIS — N40.0 BENIGN PROSTATIC HYPERPLASIA WITHOUT LOWER URINARY TRACT SYMPTOMS: ICD-10-CM

## 2021-06-01 DIAGNOSIS — Z99.89 OSA ON CPAP: ICD-10-CM

## 2021-06-01 DIAGNOSIS — G47.33 OSA ON CPAP: ICD-10-CM

## 2021-06-01 DIAGNOSIS — K21.9 GASTROESOPHAGEAL REFLUX DISEASE WITHOUT ESOPHAGITIS: ICD-10-CM

## 2021-06-01 DIAGNOSIS — D36.7 BENIGN NEOPLASM OF LOWER EXTREMITY: ICD-10-CM

## 2021-06-01 DIAGNOSIS — E66.9 OBESITY (BMI 35.0-39.9 WITHOUT COMORBIDITY): ICD-10-CM

## 2021-06-01 DIAGNOSIS — I10 ESSENTIAL HYPERTENSION: ICD-10-CM

## 2021-06-01 DIAGNOSIS — J30.89 SEASONAL ALLERGIC RHINITIS DUE TO OTHER ALLERGIC TRIGGER: ICD-10-CM

## 2021-06-01 DIAGNOSIS — A04.8 POSITIVE HELICOBACTER PYLORI TEST: ICD-10-CM

## 2021-06-01 DIAGNOSIS — F45.8 ANXIETY HYPERVENTILATION: ICD-10-CM

## 2021-06-01 DIAGNOSIS — R13.12 OROPHARYNGEAL DYSPHAGIA: ICD-10-CM

## 2021-06-01 DIAGNOSIS — N52.01 ERECTILE DYSFUNCTION DUE TO ARTERIAL INSUFFICIENCY: ICD-10-CM

## 2021-06-01 DIAGNOSIS — M67.919 TENDINOPATHY OF ROTATOR CUFF, UNSPECIFIED LATERALITY: ICD-10-CM

## 2021-06-01 DIAGNOSIS — R13.12 OROPHARYNGEAL DYSPHAGIA: Primary | ICD-10-CM

## 2021-06-01 DIAGNOSIS — F41.9 ANXIETY HYPERVENTILATION: ICD-10-CM

## 2021-06-01 PROBLEM — N18.30 STAGE 3 CHRONIC KIDNEY DISEASE (HCC): Status: RESOLVED | Noted: 2018-11-19 | Resolved: 2021-06-01

## 2021-06-01 LAB
-: NORMAL
AMORPHOUS: NORMAL
BACTERIA: NORMAL
BILIRUBIN URINE: NEGATIVE
CASTS UA: NORMAL /LPF (ref 0–8)
CHOLESTEROL/HDL RATIO: 2.2
CHOLESTEROL: 114 MG/DL
COLOR: YELLOW
CRYSTALS, UA: NORMAL /HPF
EPITHELIAL CELLS UA: NORMAL /HPF (ref 0–5)
ESTIMATED AVERAGE GLUCOSE: 108 MG/DL
GLUCOSE URINE: NEGATIVE
HBA1C MFR BLD: 5.4 % (ref 4–6)
HCT VFR BLD CALC: 40.8 % (ref 40.7–50.3)
HDLC SERPL-MCNC: 52 MG/DL
HEMOGLOBIN: 12.7 G/DL (ref 13–17)
KETONES, URINE: NEGATIVE
LDL CHOLESTEROL: 53 MG/DL (ref 0–130)
LEUKOCYTE ESTERASE, URINE: NEGATIVE
MCH RBC QN AUTO: 27.5 PG (ref 25.2–33.5)
MCHC RBC AUTO-ENTMCNC: 31.1 G/DL (ref 28.4–34.8)
MCV RBC AUTO: 88.3 FL (ref 82.6–102.9)
MUCUS: NORMAL
NITRITE, URINE: NEGATIVE
NRBC AUTOMATED: 0 PER 100 WBC
OTHER OBSERVATIONS UA: NORMAL
PDW BLD-RTO: 13.5 % (ref 11.8–14.4)
PH UA: 7 (ref 5–8)
PLATELET # BLD: 225 K/UL (ref 138–453)
PMV BLD AUTO: 10.6 FL (ref 8.1–13.5)
PROSTATE SPECIFIC ANTIGEN: 2.31 UG/L
PROTEIN UA: NEGATIVE
RBC # BLD: 4.62 M/UL (ref 4.21–5.77)
RBC UA: NORMAL /HPF (ref 0–4)
RENAL EPITHELIAL, UA: NORMAL /HPF
SPECIFIC GRAVITY UA: 1 (ref 1–1.03)
TRICHOMONAS: NORMAL
TRIGL SERPL-MCNC: 47 MG/DL
TSH SERPL DL<=0.05 MIU/L-ACNC: 2.39 MIU/L (ref 0.3–5)
TURBIDITY: CLEAR
URINE HGB: NEGATIVE
UROBILINOGEN, URINE: NORMAL
VLDLC SERPL CALC-MCNC: NORMAL MG/DL (ref 1–30)
WBC # BLD: 3.8 K/UL (ref 3.5–11.3)
WBC UA: NORMAL /HPF (ref 0–5)
YEAST: NORMAL

## 2021-06-01 PROCEDURE — G8427 DOCREV CUR MEDS BY ELIG CLIN: HCPCS | Performed by: FAMILY MEDICINE

## 2021-06-01 PROCEDURE — 99214 OFFICE O/P EST MOD 30 MIN: CPT | Performed by: FAMILY MEDICINE

## 2021-06-01 PROCEDURE — 1036F TOBACCO NON-USER: CPT | Performed by: FAMILY MEDICINE

## 2021-06-01 PROCEDURE — G8417 CALC BMI ABV UP PARAM F/U: HCPCS | Performed by: FAMILY MEDICINE

## 2021-06-01 PROCEDURE — 3017F COLORECTAL CA SCREEN DOC REV: CPT | Performed by: FAMILY MEDICINE

## 2021-06-01 SDOH — ECONOMIC STABILITY: FOOD INSECURITY: WITHIN THE PAST 12 MONTHS, THE FOOD YOU BOUGHT JUST DIDN'T LAST AND YOU DIDN'T HAVE MONEY TO GET MORE.: NEVER TRUE

## 2021-06-01 SDOH — ECONOMIC STABILITY: FOOD INSECURITY: WITHIN THE PAST 12 MONTHS, YOU WORRIED THAT YOUR FOOD WOULD RUN OUT BEFORE YOU GOT MONEY TO BUY MORE.: NEVER TRUE

## 2021-06-01 ASSESSMENT — ENCOUNTER SYMPTOMS
RESPIRATORY NEGATIVE: 1
ALLERGIC/IMMUNOLOGIC NEGATIVE: 1
EYES NEGATIVE: 1
GASTROINTESTINAL NEGATIVE: 1

## 2021-06-01 ASSESSMENT — SOCIAL DETERMINANTS OF HEALTH (SDOH): HOW HARD IS IT FOR YOU TO PAY FOR THE VERY BASICS LIKE FOOD, HOUSING, MEDICAL CARE, AND HEATING?: NOT HARD AT ALL

## 2021-06-01 NOTE — PROGRESS NOTES
Subjective:      Patient ID: Clarissa Urrutia is a 56 y.o. male. Hypertension  This is a chronic problem. The current episode started more than 1 month ago. The problem has been gradually improving since onset. The problem is controlled. Associated symptoms include anxiety. Risk factors for coronary artery disease include obesity, male gender, dyslipidemia and stress. Past treatments include calcium channel blockers, diuretics and beta blockers. The current treatment provides moderate improvement. Identifiable causes of hypertension include sleep apnea. Review of Systems   Constitutional: Negative. HENT: Negative. Eyes: Negative. Respiratory: Negative. Cardiovascular: Negative. Gastrointestinal: Negative. Endocrine: Negative. Musculoskeletal: Positive for arthralgias. Skin: Negative. Allergic/Immunologic: Negative. Neurological: Negative. Hematological: Negative. Psychiatric/Behavioral: The patient is nervous/anxious. Past family and social history unremarkable. Diagnosis Orders   1. Oropharyngeal dysphagia  Alex Hauser MD, Neurology, [facility]    CBC    Hemoglobin A1C    Lipid Panel    TSH without Reflex    Urinalysis with Microscopic    PSA screening   2. Benign prostatic hyperplasia without lower urinary tract symptoms     3. Erectile dysfunction due to arterial insufficiency     4. Gastroesophageal reflux disease without esophagitis     5. Essential hypertension     6. Mixed hyperlipidemia  CBC    Hemoglobin A1C    Lipid Panel    TSH without Reflex    Urinalysis with Microscopic    PSA screening   7. Tendinopathy of rotator cuff, unspecified laterality     8. YUMIKO on CPAP  CBC    Hemoglobin A1C    Lipid Panel    TSH without Reflex    Urinalysis with Microscopic    PSA screening   9. Obesity (BMI 35.0-39.9 without comorbidity)     10. Anxiety hyperventilation     11. Benign neoplasm of lower extremity     12.  Seasonal allergic rhinitis due to other allergic trigger     13. Positive Helicobacter pylori test           Objective:   Physical Exam  Vitals and nursing note reviewed. Constitutional:       Appearance: He is well-developed. Comments: Central obesity   HENT:      Head: Normocephalic and atraumatic. Right Ear: External ear normal.      Left Ear: External ear normal.      Nose: Nose normal.      Comments: Allergies, allergic rhinitis  Eyes:      Conjunctiva/sclera: Conjunctivae normal.      Pupils: Pupils are equal, round, and reactive to light. Cardiovascular:      Rate and Rhythm: Normal rate and regular rhythm. Heart sounds: Normal heart sounds. Comments: Hypertension  Hyperlipidemia  Pulmonary:      Effort: Pulmonary effort is normal.      Breath sounds: Normal breath sounds. Abdominal:      General: Bowel sounds are normal.      Palpations: Abdomen is soft. Comments: Oropharyngeal subjective complaint of dysphagia with negative swallow study, EGD and evaluation by ENT. He denies weight loss  GERD on proton pump inhibitor   Musculoskeletal:         General: Normal range of motion. Cervical back: Normal range of motion and neck supple. Skin:     General: Skin is warm and dry. Neurological:      Mental Status: He is alert and oriented to person, place, and time. Deep Tendon Reflexes: Reflexes are normal and symmetric. Psychiatric:      Comments: Anxiety about health         Assessment:       Diagnosis Orders   1. Oropharyngeal dysphagia  Wendy Villalobos MD, Neurology, [facility]    CBC    Hemoglobin A1C    Lipid Panel    TSH without Reflex    Urinalysis with Microscopic    PSA screening   2. Benign prostatic hyperplasia without lower urinary tract symptoms     3. Erectile dysfunction due to arterial insufficiency     4. Gastroesophageal reflux disease without esophagitis     5. Essential hypertension     6.  Mixed hyperlipidemia  CBC    Hemoglobin A1C    Lipid Panel    TSH without Reflex Urinalysis with Microscopic    PSA screening   7. Tendinopathy of rotator cuff, unspecified laterality     8. YUMIKO on CPAP  CBC    Hemoglobin A1C    Lipid Panel    TSH without Reflex    Urinalysis with Microscopic    PSA screening   9. Obesity (BMI 35.0-39.9 without comorbidity)     10. Anxiety hyperventilation     11. Benign neoplasm of lower extremity     12. Seasonal allergic rhinitis due to other allergic trigger     13. Positive Helicobacter pylori test             Plan:      78-year-old anxious -American male returns for subjective complaint of ongoing oropharyngeal dysphagia without weight loss fever chills. He has had extensive studies done by gastroenterology including videofluoroscopy, EGD with negative results. He has also been evaluated by ENT. Patient states that his GI suggested evaluation by neurology. Necessary arrangement will be made. He appears anxious however denies being depressed. Patient states that he feels frustrated that nobody is found etiology of his ongoing complaint of dysphagia. Reassurance provided. GERD stable on proton pump inhibitor. He denies epigastric symptom or tarry stool  Hypertension well controlled with Diovan HCT and calcium channel blocker and potassium. Consume less than 2 g of salt a day  Obesity. He will benefit from weight loss to keep BMI around 25. Low-fat high-fiber diet, daily moderate exercise and lifestyle change  Hyperlipidemia on statin that he is tolerating well  Allergies allergic rhinitis. Continue Flonase Claritin and nasal saline. Sleep apnea on CPAP  CKD.   Last creatinine as of 3/2021 was 0.97 with BUN of 12  He denies ongoing history of tobacco, excessive alcohol or illicit drug use  He is updated on COVID-19 vaccination that he tolerated well  Asymptomatic BPH  Further recommendations to follow labs  Call for any concern  This note is created with a voice recognition program and while intend to generate a document that accurately reflects the content of the visit, no guarantee can be provided that every mistake has been identified and corrected by editing.           Karyle Feil, MD

## 2021-06-08 ENCOUNTER — OFFICE VISIT (OUTPATIENT)
Dept: GASTROENTEROLOGY | Age: 61
End: 2021-06-08
Payer: COMMERCIAL

## 2021-06-08 VITALS — BODY MASS INDEX: 34.04 KG/M2 | DIASTOLIC BLOOD PRESSURE: 66 MMHG | SYSTOLIC BLOOD PRESSURE: 122 MMHG | WEIGHT: 251 LBS

## 2021-06-08 DIAGNOSIS — A04.8 H. PYLORI INFECTION: ICD-10-CM

## 2021-06-08 DIAGNOSIS — R13.12 OROPHARYNGEAL DYSPHAGIA: Primary | ICD-10-CM

## 2021-06-08 PROCEDURE — G8417 CALC BMI ABV UP PARAM F/U: HCPCS | Performed by: INTERNAL MEDICINE

## 2021-06-08 PROCEDURE — 99213 OFFICE O/P EST LOW 20 MIN: CPT | Performed by: INTERNAL MEDICINE

## 2021-06-08 PROCEDURE — G8427 DOCREV CUR MEDS BY ELIG CLIN: HCPCS | Performed by: INTERNAL MEDICINE

## 2021-06-08 PROCEDURE — 3017F COLORECTAL CA SCREEN DOC REV: CPT | Performed by: INTERNAL MEDICINE

## 2021-06-08 PROCEDURE — 1036F TOBACCO NON-USER: CPT | Performed by: INTERNAL MEDICINE

## 2021-06-08 RX ORDER — OMEPRAZOLE 20 MG/1
20 CAPSULE, DELAYED RELEASE ORAL DAILY
Qty: 30 CAPSULE | Refills: 2 | Status: SHIPPED | OUTPATIENT
Start: 2021-06-08 | End: 2021-09-10

## 2021-06-08 ASSESSMENT — ENCOUNTER SYMPTOMS
TROUBLE SWALLOWING: 1
ALLERGIC/IMMUNOLOGIC NEGATIVE: 1
GASTROINTESTINAL NEGATIVE: 1
RESPIRATORY NEGATIVE: 1

## 2021-06-08 NOTE — PROGRESS NOTES
GI FOLLOW UP    INTERVAL HISTORY:     Recently seen by speech-language pathology-believed to have oropharyngeal dysphagia from neurological etiology. Possible cranial nerves involved. Difficulty initiating swallowing  Pending evaluation by neurology      Chief Complaint   Patient presents with    Follow-up     6 week follow up after SLP evaluation    Other     Patient states after SLP eval they want to refer patient to neurology.  Dysphagia     Patient states having pureed foods/liquids to avoid troubles swallowing       1. Oropharyngeal dysphagia          HISTORY OF PRESENT ILLNESS: Korey Hutton is a 56 y. o. 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  Patient reports that he had scrambled eggs and toast and since then he has been having difficulty swallowing localized to the oropharyngeal area.  Denies any globus sensation.  Denies any regurgitation or chest symptoms.  No other GI symptoms.  Patient reports that he is able to tolerate liquids with more thicker consistency currently which is improvement from previous.  Has yet to try solid food due to fear of dysphagia.  Denies any neurological symptoms that might suggest a possible TIA or CVA.     2007--throat surgery-- resection of benign growth around vocal cords.      Smoker: none   Drinking history: 10 months quit, social  Abdominal surgeries: none   Prior Colonoscopy: 2017--next in 10 yrs  Prior EGD: 2010 dysphagia symptoms at that time  FH of GI issues: none     12 days ago.  Liquids can tolerate.     Past Medical,Family, and Social History reviewed and does contribute to the patient presenting condition. Patient's PMH/PSH,SH,PSYCH Hx, MEDs, ALLERGIES, and ROS were all reviewed and updated in the appropriate sections. PAST MEDICAL HISTORY:  Past Medical History:   Diagnosis Date    BPH (benign prostatic hyperplasia)     Class 2 severe obesity due to excess calories with serious comorbidity in adult [facility])     Obesity (BMI 30.0-34. 9) [E66.9]    Dysphagia     Erectile dysfunction     Full dentures     GERD (gastroesophageal reflux disease)     Hiatal hernia     Hyperlipidemia     Hypertension     YUMIKO on CPAP     PONV (postoperative nausea and vomiting)     nauseated after last surgery    Unspecified sleep apnea     cpap nightly       Past Surgical History:   Procedure Laterality Date    COLONOSCOPY      ENDOSCOPY, COLON, DIAGNOSTIC      ESOPHAGEAL MOTILITY STUDY N/A 3/17/2021    ESOPHAGEAL MOTILITY STUDY performed by Marcio Patel MD at [address] [place] [address] N/A 7/31/2018    SUPRA PUBIC LESIONS BIOPSY EXCISION performed by [facility]air [facility] at [facility] 07/31/2018    SUPRA PUBIC LESIONS BIOPSY EXCISION (N/A )    THROAT SURGERY  2008    TUMOR REMOVAL Right 09/23/2016    [facility]  right thigh   (benign)    UPPER GASTROINTESTINAL ENDOSCOPY N/A 12/21/2020    EGD BIOPSY performed by Marcio Patel MD at [address] [place] [address] St:    Current Outpatient Medications:     potassium chloride (KLOR-CON M) 20 MEQ extended release tablet, TAKE ONE TABLET BY MOUTH EVERY DAY, Disp: 90 tablet, Rfl: 1    omeprazole (PRILOSEC) 20 MG delayed release capsule, Take 1 capsule by mouth daily Take 45 minutes prior to all other medications, Disp: 30 capsule, Rfl: 0    valsartan-hydrochlorothiazide (DIOVAN-HCT) 160-12.5 MG per tablet, TAKE ONE TABLET BY MOUTH EVERY DAY, Disp: 90 tablet, Rfl: 1    pravastatin (PRAVACHOL) 40 MG tablet, TAKE ONE TABLET BY MOUTH EVERY DAY, Disp: 90 tablet, Rfl: 1   amLODIPine (NORVASC) 10 MG tablet, TAKE ONE TABLET BY MOUTH EVERY DAY, Disp: 90 tablet, Rfl: 1    spironolactone (ALDACTONE) 50 MG tablet, TAKE ONE TABLET BY MOUTH EVERY DAY, Disp: 90 tablet, Rfl: 0    loratadine (CLARITIN) 10 MG tablet, Take 1 tablet by mouth daily, Disp: 90 tablet, Rfl: 1    fluticasone (FLONASE) 50 MCG/ACT nasal spray, 1 spray by Each Nostril route daily, Disp: 2 Bottle, Rfl: 1    azelastine (ASTELIN) 0.1 % nasal spray, 1 spray by Nasal route 2 times daily Use in each nostril as directed, Disp: 2 Bottle, Rfl: 1    tamsulosin (FLOMAX) 0.4 MG capsule, Take 0.4 mg by mouth daily, Disp: , Rfl:     ALLERGIES:   Allergies   Allergen Reactions    Pcn [Penicillins]      Patient states he was told he had allergy as a child. Has taken penicillin related medications in past with no problems. FAMILY HISTORY:       Problem Relation Age of Onset    Heart Disease Mother          SOCIAL HISTORY:   Social History     Socioeconomic History    Marital status:      Spouse name: Not on file    Number of children: Not on file    Years of education: Not on file    Highest education level: Not on file   Occupational History    Not on file   Tobacco Use    Smoking status: Never Smoker    Smokeless tobacco: Never Used   Vaping Use    Vaping Use: Never used   Substance and Sexual Activity    Alcohol use: Not Currently    Drug use: No    Sexual activity: Not on file   Other Topics Concern    Not on file   Social History Narrative    Not on file     Social Determinants of Health     Financial Resource Strain: Low Risk     Difficulty of Paying Living Expenses: Not hard at all   Food Insecurity: No Food Insecurity    Worried About [facility] in the Last Year: Never true    [facility] in the Last Year: Never true   Transportation Needs:     Lack of Transportation (Medical):      Lack of Transportation (Non-Medical):    Physical Activity:     Days of Exercise per Week:     Minutes of Exercise per Session:    Stress:     Feeling of Stress :    Social Connections:     Frequency of Communication with Friends and Family:     Frequency of Social Gatherings with Friends and Family:     Attends [employer] Services:     Active Member of Clubs or Organizations:     Attends Club or Organization Meetings:     Marital Status:    Intimate Partner Violence:     Fear of Current or Ex-Partner:     Emotionally Abused:     Physically Abused:     Sexually Abused:        REVIEW OF SYSTEMS: A 12-point review of systems was obtained and pertinent positives and negatives were listed below. REVIEW OF SYSTEMS:     Constitutional: No fever, no chills, no lethargy, no weakness. HEENT:  No headache, otalgia, itchy eyes, nasal discharge or sore throat. Cardiac:  No chest pain, dyspnea, orthopnea or PND. Chest:   No cough, phlegm or wheezing. Abdomen:      Detailed by MA   Neuro:  No focal weakness, abnormal movements or seizure like activity. Skin:   No rashes, no itching. :   No hematuria, no pyuria, no dysuria, no flank pain. Extremities:  No swelling or joint pains. ROS was otherwise negative    Review of Systems   Constitutional: Positive for appetite change. Negative for fatigue. HENT: Positive for trouble swallowing. Eyes: Positive for visual disturbance. Respiratory: Negative. Cardiovascular: Negative. Gastrointestinal: Negative. Endocrine: Negative. Genitourinary: Negative. Musculoskeletal: Negative. Skin: Negative. Allergic/Immunologic: Negative. Neurological: Negative. Hematological: Negative. Psychiatric/Behavioral: Negative. All other systems reviewed and are negative. PHYSICAL EXAMINATION: Vital signs reviewed per the nursing documentation. /66   Wt 251 lb (113.9 kg)   BMI 34.04 kg/m²   Body mass index is 34.04 kg/m². Physical Exam    Physical Exam   Constitutional: Patient is oriented to person, place, and time. Patient appears well-developed and well-nourished. HENT:   Head: Normocephalic and atraumatic. Eyes: Pupils are equal, round, and reactive to light. EOM are normal.   Neck: Normal range of motion. Neck supple. No JVD present. No tracheal deviation present. No thyromegaly present. Cardiovascular: Normal rate, regular rhythm, normal heart sounds and intact distal pulses. Pulmonary/Chest: Effort normal and breath sounds normal. No stridor. No respiratory distress. He has no wheezes. He has no rales. He exhibits no tenderness. Abdominal: Soft. Bowel sounds are normal. He exhibits no distension and no mass. There is no tenderness. There is no rebound and no guarding. No hernia. Musculoskeletal: Normal range of motion. Lymphadenopathy:    Patient has no cervical adenopathy. Neurological: Patient is alert and oriented to person, place, and time. Psychiatric: Patient has a normal mood and affect. Patient behavior is normal.       LABORATORY DATA: Reviewed  Lab Results   Component Value Date    WBC 3.8 06/01/2021    HGB 12.7 (L) 06/01/2021    HCT 40.8 06/01/2021    MCV 88.3 06/01/2021     06/01/2021     (L) 03/02/2021    K 3.9 03/02/2021     03/02/2021    CO2 30 03/02/2021    BUN 12 03/02/2021    CREATININE 0.97 03/02/2021    LABPROT 7.9 05/09/2012    LABALBU 4.4 09/11/2020    BILITOT 0.90 09/11/2020    ALKPHOS 96 09/11/2020    AST 17 09/11/2020    ALT 14 09/11/2020         Lab Results   Component Value Date    RBC 4.62 06/01/2021    HGB 12.7 (L) 06/01/2021    MCV 88.3 06/01/2021    MCH 27.5 06/01/2021    MCHC 31.1 06/01/2021    RDW 13.5 06/01/2021    MPV 10.6 06/01/2021    BASOPCT 1 07/20/2018    LYMPHSABS 1.60 07/20/2018    MONOSABS 0.40 07/20/2018    NEUTROABS 3.00 07/20/2018    EOSABS 0.00 07/20/2018    BASOSABS 0.00 07/20/2018         DIAGNOSTIC TESTING:     No results found. IMPRESSION: Mr.Ernest DORIE Hutton is a 56 y. o. 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  Patient reports that he had scrambled eggs and toast and since then he has been having difficulty swallowing localized to the oropharyngeal area.  Denies any globus sensation.  Denies any regurgitation or chest symptoms.  No other GI symptoms.  Patient reports that he is able to tolerate liquids with more thicker consistency currently which is improvement from previous.  Has yet to try solid food due to fear of dysphagia.  Denies any neurological symptoms that might suggest a possible TIA or CVA.        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-eradicated with negative breath test  Continues to have difficulty swallowing with solid food only-no structural causes identified during upper endoscopy and upper GI/esophagram  Esophageal biopsies were negative  Reports swallowing-\" fatigue \"  Unable to initiate swallowing during fatigue episodes   Was referred to neurology      Assessment  1. Oropharyngeal dysphagia      Per speech-language pathology: \"Recommend full neurology workup to r/o/confirm involvement of cranial nerves VII and XII in observed mild oral stage dysphagia. \"    PLAN:    1) OP dysphagia--pending evaluation with neurology. Negative endoscopic evaluation. No structural causes to explain the patient's symptoms. Suspect to be neurogenic in etiology. 2) follow-up after neurology evaluation. Dietary modifications recommendations for speech-language pathology. 3) Follow up in 4 months. Thank you for allowing me to participate in the care of Mr. Josselin Gonzalez. For any further questions please do not hesitate to contact me.     I have reviewed and agree with the ROS entered by the MA/LPN from today's encounter documented in a separate note. Baldomero Garnett MD, MPH   [facility] Gastroenterology  Office #: [phone]    this note is created with the assistance of a speech recognition program.  While intending to generate a document that actually reflects the content of the visit, the document can still have some errors including those of syntax and sound a like substitutions which may escape proof reading. It such instances, actual meaning can be extrapolated by contextual diversion.

## 2021-06-21 DIAGNOSIS — I10 ESSENTIAL HYPERTENSION: ICD-10-CM

## 2021-06-21 RX ORDER — LORATADINE 10 MG/1
TABLET ORAL
Qty: 90 TABLET | Refills: 1 | Status: SHIPPED | OUTPATIENT
Start: 2021-06-21 | End: 2021-10-21

## 2021-06-21 RX ORDER — AMLODIPINE BESYLATE 10 MG/1
TABLET ORAL
Qty: 90 TABLET | Refills: 1 | Status: SHIPPED | OUTPATIENT
Start: 2021-06-21 | End: 2021-12-20

## 2021-06-21 RX ORDER — VALSARTAN AND HYDROCHLOROTHIAZIDE 160; 12.5 MG/1; MG/1
TABLET, FILM COATED ORAL
Qty: 90 TABLET | Refills: 1 | Status: SHIPPED | OUTPATIENT
Start: 2021-06-21 | End: 2021-12-20

## 2021-06-23 NOTE — TELEPHONE ENCOUNTER
Joaquin Quintero is calling to request a refill on the following medication(s):    Medication Request:  Requested Prescriptions     Pending Prescriptions Disp Refills    pravastatin (PRAVACHOL) 40 MG tablet [Pharmacy Med Name: pravastatin 40 mg tablet] 90 tablet 1     Sig: TAKE ONE TABLET BY MOUTH EVERY DAY     Last filled 12/17/20 90 day 1 refill    Last Visit Date (If Applicable):  1/2/0516    Next Visit Date:    9/7/2021

## 2021-06-24 RX ORDER — PRAVASTATIN SODIUM 40 MG
TABLET ORAL
Qty: 90 TABLET | Refills: 1 | Status: SHIPPED | OUTPATIENT
Start: 2021-06-24 | End: 2021-12-20

## 2021-07-08 ENCOUNTER — HOSPITAL ENCOUNTER (OUTPATIENT)
Dept: PREADMISSION TESTING | Age: 61
Discharge: HOME | End: 2021-07-12
Payer: COMMERCIAL

## 2021-07-08 VITALS
BODY MASS INDEX: 34.4 KG/M2 | HEIGHT: 72 IN | HEART RATE: 80 BPM | TEMPERATURE: 98.9 F | RESPIRATION RATE: 16 BRPM | SYSTOLIC BLOOD PRESSURE: 146 MMHG | WEIGHT: 254 LBS | DIASTOLIC BLOOD PRESSURE: 62 MMHG | OXYGEN SATURATION: 99 %

## 2021-07-08 LAB
ANION GAP SERPL CALCULATED.3IONS-SCNC: 12 MMOL/L (ref 9–17)
BUN BLDV-MCNC: 13 MG/DL (ref 8–23)
BUN/CREAT BLD: 12 (ref 9–20)
CALCIUM SERPL-MCNC: 9.7 MG/DL (ref 8.6–10.4)
CHLORIDE BLD-SCNC: 103 MMOL/L (ref 98–107)
CO2: 27 MMOL/L (ref 20–31)
CREAT SERPL-MCNC: 1.11 MG/DL (ref 0.7–1.2)
GFR AFRICAN AMERICAN: >60 ML/MIN
GFR NON-AFRICAN AMERICAN: >60 ML/MIN
GFR SERPL CREATININE-BSD FRML MDRD: ABNORMAL ML/MIN/{1.73_M2}
GFR SERPL CREATININE-BSD FRML MDRD: ABNORMAL ML/MIN/{1.73_M2}
GLUCOSE BLD-MCNC: 104 MG/DL (ref 70–99)
HCT VFR BLD CALC: 41.3 % (ref 40.7–50.3)
HEMOGLOBIN: 12.9 G/DL (ref 13–17)
MCH RBC QN AUTO: 27 PG (ref 25.2–33.5)
MCHC RBC AUTO-ENTMCNC: 31.2 G/DL (ref 28.4–34.8)
MCV RBC AUTO: 86.6 FL (ref 82.6–102.9)
NRBC AUTOMATED: 0 PER 100 WBC
PDW BLD-RTO: 13.3 % (ref 11.8–14.4)
PLATELET # BLD: 202 K/UL (ref 138–453)
PMV BLD AUTO: 9.7 FL (ref 8.1–13.5)
POTASSIUM SERPL-SCNC: 3.8 MMOL/L (ref 3.7–5.3)
RBC # BLD: 4.77 M/UL (ref 4.21–5.77)
SODIUM BLD-SCNC: 142 MMOL/L (ref 135–144)
WBC # BLD: 5 K/UL (ref 3.5–11.3)

## 2021-07-08 PROCEDURE — 36415 COLL VENOUS BLD VENIPUNCTURE: CPT

## 2021-07-08 PROCEDURE — 85027 COMPLETE CBC AUTOMATED: CPT

## 2021-07-08 PROCEDURE — 80048 BASIC METABOLIC PNL TOTAL CA: CPT

## 2021-07-08 PROCEDURE — 93005 ELECTROCARDIOGRAM TRACING: CPT | Performed by: ANESTHESIOLOGY

## 2021-07-08 NOTE — PRE-PROCEDURE INSTRUCTIONS
ARRIVE  Leland Rios Friday, July 16, 2021 at 1230 PM    Once you enter the hospital lobby, take the elevators to the second floor. Check-In is at the surgery registration desk. Continue to take your home medications as you normally do up to and including the night before surgery with the exception of any blood thinning medications. Please stop any blood thinning medications as directed by your surgeon or prescribing physician. Failure to stop certain medications may interfere with your scheduled surgery. These may include:  Aspirin, Warfarin (Coumadin), Clopidogrel (Plavix), Ibuprofen (Motrin, Advil), Naproxen (Aleve), Meloxicam (Mobic), Celecoxib (Celebrex), Eliquis, Pradaxa, Xarelto, Effient, Fish Oil, Herbal supplements. Stop all blood thinning medications 7-10 days prior to surgery as directed per physician              Please take the following medication(s) the day of surgery with a small sip of water:  Amlodipine, omeprazole       PREPARING FOR YOUR SURGERY:     Before surgery, you can play an important role in your own health. Because skin is not sterile, we need to be sure that your skin is as free of germs as possible before surgery by carefully washing before surgery. Preparing or prepping skin before surgery can reduce the risk of a surgical site infection.   Do not shave the area of your body where your surgery will be performed unless you received specific permission from your physician. Preoperative Bathing Instructions   Bathe or shower the day of surgery.  Wear clean clothing after bathing.  Shampoo hair before surgery   Keep nails clean    Do not apply alcohol-based hair or skin products,    Do not apply lotion, emollients, cosmetics, perfumes or deodorant the day of surgery.  Do not shave the area of your body where your surgery will be performed unless you receive specific permission from your surgeon.        Patient Instructions:    [employer] If you are having any type of anesthesia you are to have nothing to eat or drink after midnight the night before your surgery. This includes gum, hard candy, mints, water or smoking or chewing tobacco.  The only exception to this is a small sip of water to take with any morning dose of heart, blood pressure, or seizure medications. No alcoholic beverages for 24 hours prior to surgery.  Brush your teeth but do not swallow water.  Bring your eye glasses and case with you. No contacts are to be worn the day of surgery. You also may bring your hearing aids. Most surgical procedures involving anesthesia will require that you remove your dentures prior to surgery.  If you are on C-PAP or Bi-PAP at home and plan on staying in the hospital overnight for your surgery please bring the machine with you.  Do not wear any jewelry or body piercings day of surgery. Also, NO lotion, perfume or deodorant to be used the day of surgery. No nail polish on the operative extremity (arm/leg surgeries)     If you are staying overnight with us, please bring a small bag of necessary personal items.  Please wear loose, comfortable clothing. If you are potentially going to have a cast or brace bring clothing that will fit over them.  In case of illness - If you have cold or flu like symptoms (high fever, runny nose, sore throat, cough, etc.) rash, nausea, vomiting, loose stools, and/or recent contact with someone who has a contagious disease (chicken pox, measles, etc.) Please call your doctor before coming to the hospital.     Day of Surgery/Procedure:    As a patient at [facility] - INPATIENT you can expect quality medical and nursing care that is centered on your individual needs. Our goal is to make your surgical experience as comfortable as possible    .   Transportation After Your Surgery/Procedure: You will need a friend or family member to drive you home after your procedure. Your  must be 25years of age or older and able to sign off on your discharge instructions. A taxi cab or any other form of public transportation is not acceptable. Your friend or family member must stay at the hospital throughout your procedure. Someone must remain with you for the first 24 hours after your surgery if you receive anesthesia or medication. If you do not have someone to stay with you, your procedure may be cancelled.       If you have any other questions regarding your procedure or the day of surgery, please call [phone]      _________________________  ____________________________  Signature (Patient)              Signature (Provider)               Date

## 2021-07-09 LAB
EKG ATRIAL RATE: 86 BPM
EKG P AXIS: 64 DEGREES
EKG P-R INTERVAL: 162 MS
EKG Q-T INTERVAL: 372 MS
EKG QRS DURATION: 98 MS
EKG QTC CALCULATION (BAZETT): 445 MS
EKG R AXIS: 35 DEGREES
EKG T AXIS: 62 DEGREES
EKG VENTRICULAR RATE: 86 BPM

## 2021-07-16 ENCOUNTER — ANESTHESIA EVENT (OUTPATIENT)
Dept: OPERATING ROOM | Age: 61
End: 2021-07-16
Payer: COMMERCIAL

## 2021-07-16 ENCOUNTER — HOSPITAL ENCOUNTER (OUTPATIENT)
Age: 61
Setting detail: OUTPATIENT SURGERY
Discharge: HOME | End: 2021-07-16
Attending: UROLOGY | Admitting: UROLOGY
Payer: COMMERCIAL

## 2021-07-16 ENCOUNTER — ANESTHESIA (OUTPATIENT)
Dept: OPERATING ROOM | Age: 61
End: 2021-07-16
Payer: COMMERCIAL

## 2021-07-16 VITALS
OXYGEN SATURATION: 100 % | RESPIRATION RATE: 17 BRPM | HEIGHT: 72 IN | WEIGHT: 254 LBS | BODY MASS INDEX: 34.4 KG/M2 | TEMPERATURE: 97.2 F | HEART RATE: 78 BPM | SYSTOLIC BLOOD PRESSURE: 132 MMHG | DIASTOLIC BLOOD PRESSURE: 71 MMHG

## 2021-07-16 VITALS — TEMPERATURE: 95.2 F | OXYGEN SATURATION: 99 % | SYSTOLIC BLOOD PRESSURE: 108 MMHG | DIASTOLIC BLOOD PRESSURE: 60 MMHG

## 2021-07-16 DIAGNOSIS — A63.0 GENITAL WARTS: Primary | ICD-10-CM

## 2021-07-16 LAB
BILIRUBIN URINE: NEGATIVE
COLOR: YELLOW
COMMENT UA: NORMAL
GLUCOSE URINE: NEGATIVE
KETONES, URINE: NEGATIVE
LEUKOCYTE ESTERASE, URINE: NEGATIVE
NITRITE, URINE: NEGATIVE
PH UA: 7.5 (ref 5–8)
PROTEIN UA: NEGATIVE
SPECIFIC GRAVITY UA: 1.01 (ref 1–1.03)
TURBIDITY: CLEAR
URINE HGB: NEGATIVE
UROBILINOGEN, URINE: NORMAL

## 2021-07-16 PROCEDURE — 2580000003 HC RX 258: Performed by: ANESTHESIOLOGY

## 2021-07-16 PROCEDURE — 7100000000 HC PACU RECOVERY - FIRST 15 MIN: Performed by: UROLOGY

## 2021-07-16 PROCEDURE — 7100000010 HC PHASE II RECOVERY - FIRST 15 MIN: Performed by: UROLOGY

## 2021-07-16 PROCEDURE — 7100000001 HC PACU RECOVERY - ADDTL 15 MIN: Performed by: UROLOGY

## 2021-07-16 PROCEDURE — 2500000003 HC RX 250 WO HCPCS: Performed by: UROLOGY

## 2021-07-16 PROCEDURE — 7100000011 HC PHASE II RECOVERY - ADDTL 15 MIN: Performed by: UROLOGY

## 2021-07-16 PROCEDURE — 3600000012 HC SURGERY LEVEL 2 ADDTL 15MIN: Performed by: UROLOGY

## 2021-07-16 PROCEDURE — 3700000001 HC ADD 15 MINUTES (ANESTHESIA): Performed by: UROLOGY

## 2021-07-16 PROCEDURE — 81003 URINALYSIS AUTO W/O SCOPE: CPT

## 2021-07-16 PROCEDURE — 2709999900 HC NON-CHARGEABLE SUPPLY: Performed by: UROLOGY

## 2021-07-16 PROCEDURE — 6360000002 HC RX W HCPCS: Performed by: NURSE ANESTHETIST, CERTIFIED REGISTERED

## 2021-07-16 PROCEDURE — 2500000003 HC RX 250 WO HCPCS: Performed by: NURSE ANESTHETIST, CERTIFIED REGISTERED

## 2021-07-16 PROCEDURE — 6370000000 HC RX 637 (ALT 250 FOR IP): Performed by: UROLOGY

## 2021-07-16 PROCEDURE — 3600000002 HC SURGERY LEVEL 2 BASE: Performed by: UROLOGY

## 2021-07-16 PROCEDURE — 88305 TISSUE EXAM BY PATHOLOGIST: CPT

## 2021-07-16 PROCEDURE — 3700000000 HC ANESTHESIA ATTENDED CARE: Performed by: UROLOGY

## 2021-07-16 RX ORDER — OXYCODONE HYDROCHLORIDE AND ACETAMINOPHEN 5; 325 MG/1; MG/1
1 TABLET ORAL
Status: DISCONTINUED | OUTPATIENT
Start: 2021-07-16 | End: 2021-07-16 | Stop reason: HOSPADM

## 2021-07-16 RX ORDER — SODIUM CHLORIDE 9 MG/ML
25 INJECTION, SOLUTION INTRAVENOUS PRN
Status: DISCONTINUED | OUTPATIENT
Start: 2021-07-16 | End: 2021-07-16 | Stop reason: HOSPADM

## 2021-07-16 RX ORDER — ONDANSETRON 2 MG/ML
INJECTION INTRAMUSCULAR; INTRAVENOUS PRN
Status: DISCONTINUED | OUTPATIENT
Start: 2021-07-16 | End: 2021-07-16 | Stop reason: SDUPTHER

## 2021-07-16 RX ORDER — SODIUM CHLORIDE 9 MG/ML
INJECTION, SOLUTION INTRAVENOUS CONTINUOUS
Status: DISCONTINUED | OUTPATIENT
Start: 2021-07-16 | End: 2021-07-16 | Stop reason: HOSPADM

## 2021-07-16 RX ORDER — HYDRALAZINE HYDROCHLORIDE 20 MG/ML
5 INJECTION INTRAMUSCULAR; INTRAVENOUS EVERY 10 MIN PRN
Status: DISCONTINUED | OUTPATIENT
Start: 2021-07-16 | End: 2021-07-16 | Stop reason: HOSPADM

## 2021-07-16 RX ORDER — SODIUM CHLORIDE, SODIUM LACTATE, POTASSIUM CHLORIDE, CALCIUM CHLORIDE 600; 310; 30; 20 MG/100ML; MG/100ML; MG/100ML; MG/100ML
INJECTION, SOLUTION INTRAVENOUS CONTINUOUS
Status: DISCONTINUED | OUTPATIENT
Start: 2021-07-16 | End: 2021-07-16 | Stop reason: HOSPADM

## 2021-07-16 RX ORDER — BUPIVACAINE HYDROCHLORIDE 5 MG/ML
INJECTION, SOLUTION EPIDURAL; INTRACAUDAL PRN
Status: DISCONTINUED | OUTPATIENT
Start: 2021-07-16 | End: 2021-07-16 | Stop reason: ALTCHOICE

## 2021-07-16 RX ORDER — FENTANYL CITRATE 50 UG/ML
25 INJECTION, SOLUTION INTRAMUSCULAR; INTRAVENOUS EVERY 5 MIN PRN
Status: DISCONTINUED | OUTPATIENT
Start: 2021-07-16 | End: 2021-07-16 | Stop reason: HOSPADM

## 2021-07-16 RX ORDER — LABETALOL HYDROCHLORIDE 5 MG/ML
5 INJECTION, SOLUTION INTRAVENOUS EVERY 10 MIN PRN
Status: DISCONTINUED | OUTPATIENT
Start: 2021-07-16 | End: 2021-07-16 | Stop reason: HOSPADM

## 2021-07-16 RX ORDER — LIDOCAINE HYDROCHLORIDE 20 MG/ML
JELLY TOPICAL PRN
Status: DISCONTINUED | OUTPATIENT
Start: 2021-07-16 | End: 2021-07-16 | Stop reason: ALTCHOICE

## 2021-07-16 RX ORDER — SODIUM CHLORIDE 0.9 % (FLUSH) 0.9 %
10 SYRINGE (ML) INJECTION PRN
Status: DISCONTINUED | OUTPATIENT
Start: 2021-07-16 | End: 2021-07-16 | Stop reason: HOSPADM

## 2021-07-16 RX ORDER — 0.9 % SODIUM CHLORIDE 0.9 %
500 INTRAVENOUS SOLUTION INTRAVENOUS
Status: DISCONTINUED | OUTPATIENT
Start: 2021-07-16 | End: 2021-07-16 | Stop reason: HOSPADM

## 2021-07-16 RX ORDER — LIDOCAINE HYDROCHLORIDE 20 MG/ML
INJECTION, SOLUTION EPIDURAL; INFILTRATION; INTRACAUDAL; PERINEURAL PRN
Status: DISCONTINUED | OUTPATIENT
Start: 2021-07-16 | End: 2021-07-16 | Stop reason: SDUPTHER

## 2021-07-16 RX ORDER — LIDOCAINE HYDROCHLORIDE 10 MG/ML
1 INJECTION, SOLUTION EPIDURAL; INFILTRATION; INTRACAUDAL; PERINEURAL
Status: DISCONTINUED | OUTPATIENT
Start: 2021-07-16 | End: 2021-07-16 | Stop reason: HOSPADM

## 2021-07-16 RX ORDER — DIPHENHYDRAMINE HYDROCHLORIDE 50 MG/ML
12.5 INJECTION INTRAMUSCULAR; INTRAVENOUS
Status: DISCONTINUED | OUTPATIENT
Start: 2021-07-16 | End: 2021-07-16 | Stop reason: HOSPADM

## 2021-07-16 RX ORDER — HYDROMORPHONE HYDROCHLORIDE 1 MG/ML
0.5 INJECTION, SOLUTION INTRAMUSCULAR; INTRAVENOUS; SUBCUTANEOUS EVERY 5 MIN PRN
Status: DISCONTINUED | OUTPATIENT
Start: 2021-07-16 | End: 2021-07-16 | Stop reason: HOSPADM

## 2021-07-16 RX ORDER — PROPOFOL 10 MG/ML
INJECTION, EMULSION INTRAVENOUS PRN
Status: DISCONTINUED | OUTPATIENT
Start: 2021-07-16 | End: 2021-07-16 | Stop reason: SDUPTHER

## 2021-07-16 RX ORDER — DEXAMETHASONE SODIUM PHOSPHATE 10 MG/ML
INJECTION, SOLUTION INTRAMUSCULAR; INTRAVENOUS PRN
Status: DISCONTINUED | OUTPATIENT
Start: 2021-07-16 | End: 2021-07-16 | Stop reason: SDUPTHER

## 2021-07-16 RX ORDER — SULFAMETHOXAZOLE AND TRIMETHOPRIM 400; 80 MG/1; MG/1
1 TABLET ORAL 2 TIMES DAILY
Qty: 14 TABLET | Refills: 0 | Status: SHIPPED | OUTPATIENT
Start: 2021-07-16 | End: 2021-07-23

## 2021-07-16 RX ORDER — METOCLOPRAMIDE HYDROCHLORIDE 5 MG/ML
10 INJECTION INTRAMUSCULAR; INTRAVENOUS
Status: DISCONTINUED | OUTPATIENT
Start: 2021-07-16 | End: 2021-07-16 | Stop reason: HOSPADM

## 2021-07-16 RX ORDER — SODIUM CHLORIDE 0.9 % (FLUSH) 0.9 %
10 SYRINGE (ML) INJECTION EVERY 12 HOURS SCHEDULED
Status: DISCONTINUED | OUTPATIENT
Start: 2021-07-16 | End: 2021-07-16 | Stop reason: HOSPADM

## 2021-07-16 RX ORDER — PROMETHAZINE HYDROCHLORIDE 25 MG/ML
6.25 INJECTION, SOLUTION INTRAMUSCULAR; INTRAVENOUS
Status: DISCONTINUED | OUTPATIENT
Start: 2021-07-16 | End: 2021-07-16 | Stop reason: HOSPADM

## 2021-07-16 RX ORDER — CEFAZOLIN SODIUM 1 G/3ML
INJECTION, POWDER, FOR SOLUTION INTRAMUSCULAR; INTRAVENOUS PRN
Status: DISCONTINUED | OUTPATIENT
Start: 2021-07-16 | End: 2021-07-16 | Stop reason: SDUPTHER

## 2021-07-16 RX ORDER — FENTANYL CITRATE 50 UG/ML
INJECTION, SOLUTION INTRAMUSCULAR; INTRAVENOUS PRN
Status: DISCONTINUED | OUTPATIENT
Start: 2021-07-16 | End: 2021-07-16 | Stop reason: SDUPTHER

## 2021-07-16 RX ORDER — CEPHALEXIN 500 MG/1
500 CAPSULE ORAL 3 TIMES DAILY
Qty: 15 CAPSULE | Refills: 0 | Status: CANCELLED | OUTPATIENT
Start: 2021-07-16 | End: 2021-07-21

## 2021-07-16 RX ORDER — MEPERIDINE HYDROCHLORIDE 50 MG/ML
12.5 INJECTION INTRAMUSCULAR; INTRAVENOUS; SUBCUTANEOUS EVERY 5 MIN PRN
Status: DISCONTINUED | OUTPATIENT
Start: 2021-07-16 | End: 2021-07-16 | Stop reason: HOSPADM

## 2021-07-16 RX ORDER — HYDROCODONE BITARTRATE AND ACETAMINOPHEN 5; 325 MG/1; MG/1
1 TABLET ORAL EVERY 6 HOURS PRN
Qty: 12 TABLET | Refills: 0 | Status: SHIPPED | OUTPATIENT
Start: 2021-07-16 | End: 2021-07-19

## 2021-07-16 RX ADMIN — ONDANSETRON 4 MG: 2 INJECTION, SOLUTION INTRAMUSCULAR; INTRAVENOUS at 14:10

## 2021-07-16 RX ADMIN — DEXAMETHASONE SODIUM PHOSPHATE 10 MG: 10 INJECTION INTRAMUSCULAR; INTRAVENOUS at 14:10

## 2021-07-16 RX ADMIN — Medication 50 MCG: at 14:03

## 2021-07-16 RX ADMIN — PROPOFOL 150 MG: 10 INJECTION, EMULSION INTRAVENOUS at 14:03

## 2021-07-16 RX ADMIN — LIDOCAINE HYDROCHLORIDE 100 MG: 20 INJECTION, SOLUTION EPIDURAL; INFILTRATION; INTRACAUDAL; PERINEURAL at 14:03

## 2021-07-16 RX ADMIN — Medication 50 MCG: at 14:16

## 2021-07-16 RX ADMIN — SODIUM CHLORIDE, POTASSIUM CHLORIDE, SODIUM LACTATE AND CALCIUM CHLORIDE: 600; 310; 30; 20 INJECTION, SOLUTION INTRAVENOUS at 12:38

## 2021-07-16 RX ADMIN — PROPOFOL 50 MG: 10 INJECTION, EMULSION INTRAVENOUS at 14:39

## 2021-07-16 RX ADMIN — CEFAZOLIN 2000 MG: 1 INJECTION, POWDER, FOR SOLUTION INTRAMUSCULAR; INTRAVENOUS at 14:15

## 2021-07-16 RX ADMIN — Medication 100 MCG: at 14:43

## 2021-07-16 ASSESSMENT — PULMONARY FUNCTION TESTS
PIF_VALUE: 10
PIF_VALUE: 10
PIF_VALUE: 2
PIF_VALUE: 0
PIF_VALUE: 23
PIF_VALUE: 0
PIF_VALUE: 3
PIF_VALUE: 2
PIF_VALUE: 1
PIF_VALUE: 1
PIF_VALUE: 2
PIF_VALUE: 10
PIF_VALUE: 2
PIF_VALUE: 2
PIF_VALUE: 0
PIF_VALUE: 0
PIF_VALUE: 2
PIF_VALUE: 10
PIF_VALUE: 3
PIF_VALUE: 2
PIF_VALUE: 2
PIF_VALUE: 10
PIF_VALUE: 10
PIF_VALUE: 5
PIF_VALUE: 3
PIF_VALUE: 2
PIF_VALUE: 2
PIF_VALUE: 10
PIF_VALUE: 2
PIF_VALUE: 2
PIF_VALUE: 0
PIF_VALUE: 1
PIF_VALUE: 3
PIF_VALUE: 2
PIF_VALUE: 3
PIF_VALUE: 10
PIF_VALUE: 0
PIF_VALUE: 2
PIF_VALUE: 0
PIF_VALUE: 3
PIF_VALUE: 1
PIF_VALUE: 2
PIF_VALUE: 3
PIF_VALUE: 2
PIF_VALUE: 10
PIF_VALUE: 3
PIF_VALUE: 1
PIF_VALUE: 2
PIF_VALUE: 10
PIF_VALUE: 2
PIF_VALUE: 10
PIF_VALUE: 3
PIF_VALUE: 1
PIF_VALUE: 2
PIF_VALUE: 1
PIF_VALUE: 2
PIF_VALUE: 1
PIF_VALUE: 10
PIF_VALUE: 2
PIF_VALUE: 2
PIF_VALUE: 15
PIF_VALUE: 2
PIF_VALUE: 16
PIF_VALUE: 2
PIF_VALUE: 1
PIF_VALUE: 3
PIF_VALUE: 0

## 2021-07-16 ASSESSMENT — PAIN SCALES - GENERAL
PAINLEVEL_OUTOF10: 0
PAINLEVEL_OUTOF10: 0
PAINLEVEL_OUTOF10: 3
PAINLEVEL_OUTOF10: 0

## 2021-07-16 ASSESSMENT — PAIN DESCRIPTION - LOCATION: LOCATION: ABDOMEN

## 2021-07-16 ASSESSMENT — ENCOUNTER SYMPTOMS: SHORTNESS OF BREATH: 0

## 2021-07-16 ASSESSMENT — PAIN - FUNCTIONAL ASSESSMENT: PAIN_FUNCTIONAL_ASSESSMENT: 0-10

## 2021-07-16 ASSESSMENT — PAIN DESCRIPTION - ORIENTATION: ORIENTATION: LOWER

## 2021-07-16 ASSESSMENT — PAIN DESCRIPTION - PAIN TYPE: TYPE: SURGICAL PAIN

## 2021-07-16 ASSESSMENT — PAIN DESCRIPTION - DESCRIPTORS: DESCRIPTORS: OTHER (COMMENT)

## 2021-07-16 NOTE — H&P
History and Physical Service   [facility]    HISTORY AND PHYSICAL EXAMINATION            Date of Evaluation: 7/16/2021  Patient name:  Royal Chavarria  MRN:   [id number]  YOB: 1960  PCP:    Kassandra Hayes MD    History Obtained From:     Patient, medical records    History of Present Illness: This is Royal Chavarria a 56 y.o. male who presents today for a CYSTOSCOPY WITH FULGURATION EXCISION OF PENILE WARTS  by Dr. Ritu Douglas  for [address]. THE PATIENT HAS . MULTIPLE LESIONS. HE STATES THAT HE HAS HAD LESIONS FOR ABOUT 2 YEARS. HE DENIES ANY PAIN BUT HS OCCASIONAL BLEEDING FROM THE LESIONS. HE PRESENTS FOR SURGICAL CORRECTION. HE DENIES FEVER, CHILLS OR SWEATS. Past Medical History:     Past Medical History:   Diagnosis Date    Arthritis     neck    BPH (benign prostatic hyperplasia)     Class 2 severe obesity due to excess calories with serious comorbidity in adult [facility])     Obesity (BMI 30.0-34. 9) [E66.9]    Dysphagia     Erectile dysfunction     Full dentures     GERD (gastroesophageal reflux disease)     Hiatal hernia     Hyperlipidemia     Hypertension     YUMIKO on CPAP     PONV (postoperative nausea and vomiting)     nauseated after last surgery    Unspecified sleep apnea     cpap nightly        Past Surgical History:     Past Surgical History:   Procedure Laterality Date    COLONOSCOPY      ENDOSCOPY, COLON, DIAGNOSTIC      ESOPHAGEAL MOTILITY STUDY N/A 3/17/2021    ESOPHAGEAL MOTILITY STUDY performed by Brown Longo MD at [facility] Endoscopy     [address] N/A 7/31/2018    SUPRA PUBIC LESIONS BIOPSY EXCISION performed by Iwona Hodge MD at [address] Bilateral 07/31/2018    SUPRA PUBIC LESIONS BIOPSY EXCISION (N/A )    THROAT SURGERY  2008    TONSILLECTOMY      TUMOR REMOVAL Right 09/23/2016    [facility]  right thigh   (benign)    UPPER GASTROINTESTINAL ENDOSCOPY N/A 12/21/2020    EGD BIOPSY performed by Marco A Gabriel MD at [facility] Endoscopy        Medications Prior to Admission:     Prior to Admission medications    Medication Sig Start Date End Date Taking? Authorizing Provider   pravastatin (PRAVACHOL) 40 MG tablet TAKE ONE TABLET BY MOUTH EVERY DAY 6/24/21  Yes Halie Fay MD   loratadine (CLARITIN) 10 MG tablet TAKE ONE TABLET BY MOUTH EVERY DAY 6/21/21  Yes Halie Fay MD   amLODIPine (NORVASC) 10 MG tablet TAKE ONE TABLET BY MOUTH EVERY DAY 6/21/21  Yes Halie Fay MD   valsartan-hydroCHLOROthiazide (DIOVAN-HCT) 160-12.5 MG per tablet TAKE ONE TABLET BY MOUTH EVERY DAY 6/21/21  Yes Halie Fay MD   omeprazole (PRILOSEC) 20 MG delayed release capsule Take 1 capsule by mouth daily Take 45 minutes prior to all other medications 6/8/21  Yes Marco A Gabriel MD   potassium chloride (KLOR-CON M) 20 MEQ extended release tablet TAKE ONE TABLET BY MOUTH EVERY DAY 3/15/21  Yes Halie Fay MD   spironolactone (ALDACTONE) 50 MG tablet TAKE ONE TABLET BY MOUTH EVERY DAY 11/2/20  Yes Halie Fay MD   fluticasone (FLONASE) 50 MCG/ACT nasal spray 1 spray by Each Nostril route daily 7/17/20  Yes Halie Fay MD   azelastine (ASTELIN) 0.1 % nasal spray 1 spray by Nasal route 2 times daily Use in each nostril as directed 7/17/20  Yes Halie Fay MD   tamsulosin (FLOMAX) 0.4 MG capsule Take 0.4 mg by mouth daily   Yes Governor Mendel MD        Allergies:     Pcn [penicillins]    Social History:     Tobacco:    reports that he has never smoked. He has never used smokeless tobacco.  Alcohol:      reports previous alcohol use. Drug Use:  reports no history of drug use. Family History:     Family History   Problem Relation Age of Onset    Heart Disease Mother        Review of Systems:     Positive and Negative as described in HPI.     CONSTITUTIONAL:  negative for fevers, chills, sweats, fatigue, weight loss  HEENT:  negative for vision, hearing changes, runny nose, throat pain  RESPIRATORY:  negative for shortness of breath, cough, congestion, wheezing. OBSTRUCTIVE SLEEP APNEA  USES A C-PAP NIGHTLY  CARDIOVASCULAR:  negative for chest pain, palpitations. HAS HYPERTENSION   GASTROINTESTINAL:  negative for nausea, HAS A HIATAL HERNIA AND GERD vomiting, diarrhea, constipation, change in bowel habits, abdominal pain   GENITOURINARY:  negative for difficulty of urination, burning with urination, frequency   INTEGUMENT:  negative for rash, HAS PENILE WARTS AS skin lesions,NO easy bruising   HEMATOLOGIC/LYMPHATIC:  negative for swelling/edema   ALLERGIC/IMMUNOLOGIC:  negative for urticaria , itching  ENDOCRINE:  negative increase in drinking, increase in urination, hot or cold intolerance  MUSCULOSKELETAL:  negative joint pains, muscle aches, swelling of joints  NEUROLOGICAL:  negative for headaches, dizziness, lightheadedness, numbness, pain, tingling extremities  BEHAVIOR/PSYCH:  negative for depression, anxiety    Physical Exam:   BP (!) 128/58   Pulse 64   Temp 97.7 °F (36.5 °C) (Temporal)   Resp 20   Ht 6' (1.829 m)   Wt 254 lb (115.2 kg)   SpO2 100%   BMI 34.45 kg/m²     No results for input(s): POCGLU in the last 72 hours. General Appearance:  alert, well appearing, and in no acute distress  Mental status: oriented to person, place, and time with normal affect  Head:  normocephalic, atraumatic. Eye: no icterus, redness, pupils equal and reactive, extraocular eye movements intact, conjunctiva clear  Ear: normal external ear, no discharge, hearing intact  Nose:  no drainage noted  Mouth: mucous membranes moist  Neck: supple, no carotid bruits, thyroid not palpable  Lungs: Bilateral equal air entry, clear to ausculation, no wheezing, rales or rhonchi, normal effort  Cardiovascular: HR  normal rate, regular rhythm, no murmur, gallop, rub.   Abdomen: Soft, nontender, nondistended, normal bowel sounds  Neurologic: There are no new focal motor or sensory deficits, normal muscle tone and bulk, no abnormal sensation, normal speech, cranial nerves II through XII grossly intact  Skin: No gross lesions, rashes, bruising or bleeding on exposed skin area  Extremities:  peripheral pulses palpable, no pedal edema or calf pain with palpation  Psych: normal affect     Investigations:      Laboratory Testing:  No results found for this or any previous visit (from the past 24 hour(s)). Recent Labs     07/08/21  1304   HGB 12.9*   HCT 41.3   WBC 5.0   MCV 86.6      K 3.8      CO2 27   BUN 13   CREATININE 1.11   GLUCOSE 104*       No results for input(s): COVID19 in the last 720 hours. Imaging/Diagnostics:    No results found. Diagnosis:      1. PENILE WARTS    Plans:     1. CYSTOSCOPY WITH FULGURATION EXCISION OF PENILE WARTS.       ROBER Varela - CNP  7/16/2021  12:53 PM

## 2021-07-16 NOTE — ANESTHESIA PRE PROCEDURE
Department of Anesthesiology  Preprocedure Note       Name:  Imani Garnett   Age:  56 y.o.  :  1960                                          MRN:  [id number]         Date:  2021      Surgeon: Caden Wade):  Ama Butler MD    Procedure: Procedure(s):  CYSTO  FULGURATION  EXCISION PENILE WARTS    Medications prior to admission:   Prior to Admission medications    Medication Sig Start Date End Date Taking?  Authorizing Provider   pravastatin (PRAVACHOL) 40 MG tablet TAKE ONE TABLET BY MOUTH EVERY DAY 21  Yes Lenore Wolfe MD   loratadine (CLARITIN) 10 MG tablet TAKE ONE TABLET BY MOUTH EVERY DAY 21  Yes Lenore Wolfe MD   amLODIPine (NORVASC) 10 MG tablet TAKE ONE TABLET BY MOUTH EVERY DAY 21  Yes Lenore Wolfe MD   valsartan-hydroCHLOROthiazide (DIOVAN-HCT) 160-12.5 MG per tablet TAKE ONE TABLET BY MOUTH EVERY DAY 21  Yes Lenore Wolfe MD   omeprazole (PRILOSEC) 20 MG delayed release capsule Take 1 capsule by mouth daily Take 45 minutes prior to all other medications 21  Yes Igor Redmond MD   potassium chloride (KLOR-CON M) 20 MEQ extended release tablet TAKE ONE TABLET BY MOUTH EVERY DAY 3/15/21  Yes Lenore Wolfe MD   spironolactone (ALDACTONE) 50 MG tablet TAKE ONE TABLET BY MOUTH EVERY DAY 20  Yes Lenore Wolfe MD   fluticasone (FLONASE) 50 MCG/ACT nasal spray 1 spray by Each Nostril route daily 20  Yes Lenore Wolfe MD   azelastine (ASTELIN) 0.1 % nasal spray 1 spray by Nasal route 2 times daily Use in each nostril as directed 20  Yes Lenore Wolfe MD   tamsulosin (FLOMAX) 0.4 MG capsule Take 0.4 mg by mouth daily   Yes Ama Butler MD       Current medications:    Current Facility-Administered Medications   Medication Dose Route Frequency Provider Last Rate Last Admin    0.9 % sodium chloride infusion   Intravenous Continuous Ryan Do MD        lactated ringers infusion   Intravenous Continuous Ryan Do  mL/hr at 21 1238 New Bag at 07/16/21 1238    sodium chloride flush 0.9 % injection 10 mL  10 mL Intravenous 2 times per day Marilee Fontana MD        sodium chloride flush 0.9 % injection 10 mL  10 mL Intravenous PRN Marilee Fontana MD        0.9 % sodium chloride infusion  25 mL Intravenous PRN Marilee Fontana MD        lidocaine PF 1 % injection 1 mL  1 mL Intradermal Once PRN Marilee Fontana MD           Allergies: Allergies   Allergen Reactions    Pcn [Penicillins]      Patient states he was told he had allergy as a child. Has taken penicillin related medications in past with no problems. 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 9) [E66.9]    Dysphagia     Erectile dysfunction     Full dentures     GERD (gastroesophageal reflux disease)     Hiatal hernia     Hyperlipidemia     Hypertension     YUMIKO on CPAP     PONV (postoperative nausea and vomiting)     nauseated after last surgery    Unspecified sleep apnea     cpap nightly       Past Surgical History:        Procedure Laterality Date    COLONOSCOPY      ENDOSCOPY, COLON, DIAGNOSTIC      ESOPHAGEAL MOTILITY STUDY N/A 3/17/2021    ESOPHAGEAL MOTILITY STUDY performed by Bj Gallo MD at [address] [place] [address] SUPRIYA [place],SURG EXCIS N/A 7/31/2018    SUPRA PUBIC LESIONS BIOPSY EXCISION performed by Ama Butler MD at [facility] Bilateral 07/31/2018    SUPRA PUBIC LESIONS BIOPSY EXCISION (N/A )    THROAT SURGERY  2008    TONSILLECTOMY      TUMOR REMOVAL Right 09/23/2016    [facility]  right thigh   (benign)    UPPER GASTROINTESTINAL ENDOSCOPY N/A 12/21/2020    EGD BIOPSY performed by Igor Redmond MD at [facility] Endoscopy       Social History:    Social History     Tobacco Use    Smoking status: Never Smoker    Smokeless tobacco: Never Used   Substance Use Topics    Alcohol use: Not Currently                                Counseling given: Not Answered      Vital Signs (Current):   Vitals:    07/16/21 1210 07/16/21 1219   BP: (!) 128/58    Pulse: 64    Resp: 20    Temp: 97.7 °F (36.5 °C)    TempSrc: Temporal    SpO2: 100%    Weight:  254 lb (115.2 kg)   Height:  6' (1.829 m)                                              BP Readings from Last 3 Encounters:   07/16/21 (!) 128/58   07/08/21 (!) 146/62   06/08/21 122/66       NPO Status: Time of last liquid consumption: 2350                        Time of last solid consumption: 1830                        Date of last liquid consumption: 07/15/21                        Date of last solid food consumption: 07/15/21    BMI:   Wt Readings from Last 3 Encounters:   07/16/21 254 lb (115.2 kg)   07/08/21 254 lb (115.2 kg)   06/08/21 251 lb (113.9 kg)     Body mass index is 34.45 kg/m².     CBC:   Lab Results   Component Value Date    WBC 5.0 07/08/2021    RBC 4.77 07/08/2021    HGB 12.9 07/08/2021    HCT 41.3 07/08/2021    MCV 86.6 07/08/2021    RDW 13.3 07/08/2021     07/08/2021       CMP:   Lab Results   Component Value Date     07/08/2021    K 3.8 07/08/2021     07/08/2021    CO2 27 07/08/2021    BUN 13 07/08/2021    CREATININE 1.11 07/08/2021    GFRAA >60 07/08/2021    LABGLOM >60 07/08/2021    GLUCOSE 104 07/08/2021    PROT 8.2 09/11/2020    CALCIUM 9.7 07/08/2021    BILITOT 0.90 09/11/2020    ALKPHOS 96 09/11/2020    AST 17 09/11/2020    ALT 14 09/11/2020       POC Tests: No results for input(s): POCGLU, POCNA, POCK, POCCL, POCBUN, POCHEMO, POCHCT in the last 72 hours. Coags: No results found for: PROTIME, INR, APTT    HCG (If Applicable): No results found for: PREGTESTUR, PREGSERUM, HCG, HCGQUANT     ABGs: No results found for: PHART, PO2ART, VUQ5LEV, WQW3XTV, BEART, N2VZGNJC     Type & Screen (If Applicable):  No results found for: LABABO, LABRH    Drug/Infectious Status (If Applicable):  Lab Results   Component Value Date    HEPCAB NONREACTIVE 06/21/2017       COVID-19 Screening (If Applicable):   Lab Results   Component Value Date    COVID19 Not Detected 03/13/2021           Anesthesia Evaluation  Patient summary reviewed and Nursing notes reviewed   history of anesthetic complications: PONV. Airway: Mallampati: III  TM distance: >3 FB   Neck ROM: full  Mouth opening: > = 3 FB Dental:    (+) upper dentures and lower dentures      Pulmonary:normal exam  breath sounds clear to auscultation  (+) sleep apnea: on CPAP,      (-) shortness of breath                           Cardiovascular:    (+) hypertension:,     (-)  angina    ECG reviewed  Rhythm: regular  Rate: normal                    Neuro/Psych:   (+) psychiatric history:            GI/Hepatic/Renal:   (+) hiatal hernia, GERD: well controlled,           Endo/Other:    (+) : arthritis: OA., .                 Abdominal:             Vascular: Other Findings:             Anesthesia Plan      general     ASA 2       Induction: intravenous. MIPS: Postoperative opioids intended and Prophylactic antiemetics administered. Anesthetic plan and risks discussed with patient. Plan discussed with CRNA.                   Bo España MD   7/16/2021

## 2021-07-16 NOTE — OP NOTE
Operative Note      Patient: Lamin Vivar  YOB: 1960  MRN: [id number]    Date of Procedure: 7/16/2021    Pre-Op Diagnosis: DX MULTIPLE PUBIC WARTS, enlarged prostate, rule out intraurethral lesions    Post-Op Diagnosis: Same and Enlarged prostate no evidence of bladder tumors no intraurethral lesions       Procedure(s):  CYSTO  FULGURATION  EXCISION PENILE WARTS    Surgeon(s):  Karly Arias MD    Assistant:   * No surgical staff found *    Anesthesia: General    Estimated Blood Loss (mL): Minimal    Complications: None    Specimens:   ID Type Source Tests Collected by Time Destination   1 : CYSTO URINE Urine Urine, Cystoscopic URINE RT REFLEX TO CULTURE Karly Arias MD 7/16/2021 1430    A : SUPRAPUBIC LESIONS Tissue Penis SURGICAL PATHOLOGY Karly Arias MD 7/16/2021 1441        Implants:  * No implants in log *      Drains: * No LDAs found *    Findings: Indications: 38-year-old male with increasing symptoms of prostatism, multiple pubic warts, patient seen at the office and evaluated, patient requesting surgical excision of pubic wart also scheduled for cystoscopy to evaluate for any possible intraurethral lesions and evaluate prostate hypertrophy and bladder outlet obstruction    Detailed Description of Procedure:   Patient was brought to the operating room, positioned in dorsolithotomy, proper patient identification procedure identification. We first addressed the bladder and prostate by cystoscopy. We entered the bladder with the size 17 cystoscope, ureteroscopy revealed no intraurethral lesions, prostate demonstrates lateral lobe hypertrophy and some median lobe hypertrophy with bladder outlet obstruction. The interior of the bladder was carefully examined, trigone is normal, ureteral orifices effluxing clear urine. Anterior and lateral bladder walls unremarkable, the bladder was then emptied the scope removed.     The patient was then positioned in supine, the suprapubic and pubic area were examined, the lesions in the pubic hair were identified, surgical excision was carried out. Considering the size and the depth of the lesion elliptical incision was performed to remove the lesions totally. Bleeders were coagulated as encountered in the pubic fat and subcutaneous tissues. We then proceeded with approximation of the subcutaneous tissues and the skin edges using interrupted and running sutures of Vicryl. Patient tolerated the procedure well sterile dressing was applied there was no evidence of residual bleeding, patient returned to recovery room in stable condition.     Follow-up visit at the office in 1 week    Electronically signed by Jr Murrieta MD on 7/16/2021 at 4:05 PM

## 2021-07-20 LAB — DERMATOLOGY PATHOLOGY REPORT: NORMAL

## 2021-08-03 ENCOUNTER — OFFICE VISIT (OUTPATIENT)
Dept: NEUROLOGY | Age: 61
End: 2021-08-03
Payer: COMMERCIAL

## 2021-08-03 VITALS
DIASTOLIC BLOOD PRESSURE: 65 MMHG | HEART RATE: 84 BPM | HEIGHT: 72 IN | WEIGHT: 254 LBS | SYSTOLIC BLOOD PRESSURE: 120 MMHG | BODY MASS INDEX: 34.4 KG/M2

## 2021-08-03 DIAGNOSIS — R13.19 ESOPHAGEAL DYSPHAGIA: Primary | ICD-10-CM

## 2021-08-03 PROCEDURE — 1036F TOBACCO NON-USER: CPT | Performed by: PSYCHIATRY & NEUROLOGY

## 2021-08-03 PROCEDURE — G8417 CALC BMI ABV UP PARAM F/U: HCPCS | Performed by: PSYCHIATRY & NEUROLOGY

## 2021-08-03 PROCEDURE — G8427 DOCREV CUR MEDS BY ELIG CLIN: HCPCS | Performed by: PSYCHIATRY & NEUROLOGY

## 2021-08-03 PROCEDURE — 99204 OFFICE O/P NEW MOD 45 MIN: CPT | Performed by: PSYCHIATRY & NEUROLOGY

## 2021-08-03 PROCEDURE — 3017F COLORECTAL CA SCREEN DOC REV: CPT | Performed by: PSYCHIATRY & NEUROLOGY

## 2021-08-03 NOTE — PROGRESS NOTES
[address]  [facility], [address] [place] [address], [address] [place] [address]  Ph: [phone] or [phone]  FAX: [phone]    Chief Complaint: Oropharyngeal Dysphagia      Dear Vasquez Araiza MD     I had the pleasure of seeing your patient today in neurology consultation for his symptoms. As you would recall Maureen Mijares is a 56 y.o. male. Patient has reports with a history of difficulty swallowing since November 2020. Patient has reported to have been seen by multiple specialist in the past and was referred to neurology. He has previously completed a swallow test which was unable to identify cause of difficulty. Overall, the patient admits his symptoms have not worsen or improved since November. Patient denies any additional symptoms including diplopia. Denies urine and bowel incontinence. He admits his weight has remained stable once he found foods he could eat with minimal difficulty. Patient admits he has previously experienced a pinched nerve in his right shoulder. Furthermore, he still experiences numbness and tingling in his fingers on the right hand thought to be caused by the injury. MRI Brain W WO Contrast on 02/03/2021: No acute intracranial abnormality. Mild chronic microvascular disease within the periventricular white matter. MRI Cervical Spine WO Contrast on 02/03/2021: Moderate-sized broad-based right paracentral disc protrusion at C5-6 in mild compression of the cervical cord on the right-side.  In addition, moderate central canal stenosis and moderate bilateral foraminal stenosis are identified at this level. Broad disc osteophyte complex at C6-C7 resulting in moderate central canal stenosis.  Severe left foraminal stenosis and moderate right foraminal  stenosis are additionally appreciated at this level. Small central disc protrusion at C4-C5 with associated mild right foraminal stenosis.  Small central disc protrusion at C3-C4 with associated moderate left foraminal stenosis. Moderate left foraminal stenosis at C2-C3. Past Medical History:   Diagnosis Date    Arthritis     neck    BPH (benign prostatic hyperplasia)     Class 2 severe obesity due to excess calories with serious comorbidity in adult [facility])     Obesity (BMI 30.0-34. 9) [E66.9]    Dysphagia     Erectile dysfunction     Full dentures     GERD (gastroesophageal reflux disease)     Hiatal hernia     Hyperlipidemia     Hypertension     YUMIKO on CPAP     PONV (postoperative nausea and vomiting)     nauseated after last surgery    Unspecified sleep apnea     cpap nightly     Past Surgical History:   Procedure Laterality Date    COLONOSCOPY      ENDOSCOPY, COLON, DIAGNOSTIC      ESOPHAGEAL MOTILITY STUDY N/A 3/17/2021    ESOPHAGEAL MOTILITY STUDY performed by Lindsey Andre MD at [address] N/A 7/16/2021    CYSTO  FULGURATION  EXCISION PENILE WARTS performed by Florina Almeida MD at [address] N/A 7/31/2018    SUPRA PUBIC LESIONS BIOPSY EXCISION performed by Florina Almeida MD at [address] Bilateral 07/31/2018    SUPRA PUBIC LESIONS BIOPSY EXCISION (N/A )    THROAT SURGERY  2008    TONSILLECTOMY      TUMOR REMOVAL Right 09/23/2016    [facility]  right thigh   (benign)    UPPER GASTROINTESTINAL ENDOSCOPY N/A 12/21/2020    EGD BIOPSY performed by Lindsey Andre MD at [address] [facility] [[facility]]      Patient states he was told he had allergy as a child. Has taken penicillin related medications in past with no problems.      Family History   Problem Relation Age of Onset    Heart Disease Mother       Social History     Socioeconomic History    Marital status:      Spouse name: Not on file    Number of children: Not on file    Years of education: Not on file    Highest education level: Not on file   Occupational History    Not on file   Tobacco Use    Smoking status: Never Smoker    Smokeless tobacco: Never Used   Vaping Use    Vaping Use: Never used   Substance and Sexual Activity    Alcohol use: Not Currently    Drug use: No    Sexual activity: Not on file   Other Topics Concern    Not on file   Social History Narrative    Not on file     Social Determinants of Health     Financial Resource Strain: Low Risk     Difficulty of Paying Living Expenses: Not hard at all   Food Insecurity: No Food Insecurity    Worried About [address] [employer] in the Last Year: Never true    [facility] in the Last Year: Never true   Transportation Needs:     Lack of Transportation (Medical):      Lack of Transportation (Non-Medical):    Physical Activity:     Days of Exercise per Week:     Minutes of Exercise per Session:    Stress:     Feeling of Stress :    Social Connections:     Frequency of Communication with Friends and Family:     Frequency of Social Gatherings with Friends and Family:     Attends Hindu Services:     Active Member of Clubs or Organizations:     Attends Club or Organization Meetings:     Marital Status:    Intimate Partner Violence:     Fear of Current or Ex-Partner:     Emotionally Abused:     Physically Abused:     Sexually Abused:       /65 (Site: Left Upper Arm, Position: Sitting, Cuff Size: Medium Adult)   Pulse 84   Ht 6' (1.829 m)   Wt 254 lb (115.2 kg)   BMI 34.45 kg/m²      HEENT [] Hearing Loss  [] Visual Disturbance  [] Tinnitus  [] Eye pain   Respiratory [] Shortness of Breath  [] Cough  [] Snoring   Cardiovascular [] Chest Pain  [] Palpitations  [] Lightheaded   GI [] Constipation  [] Diarrhea  [x] Swallowing change  [] Nausea/vomiting    [] Urinary Frequency  [] Urinary Urgency   Musculoskeletal [] Neck pain  [] Back pain  [] Muscle pain  [] Restless legs   Dermatologic [] Skin changes   Neurologic [] Memory loss/confusion  [] Seizures  [] Trouble walking or imbalance  [] Dizziness  [] Sleep disturbance  [] Weakness  [x] Numbness  [] Tremors  [] Speech Difficulty  [] Headaches  [] Light Sensitivity  [] Sound Sensitivity   Endocrinology []Excessive thirst  []Excessive hunger   Psychiatric [] Anxiety/Depression  [] Hallucination   Allergy/immunology []Hives/environmental allergies   Hematologic/lymph [] Abnormal bleeding  [] Abnormal bruising       General appearence: Normal. Well groomed. In no acute distress    Head: Normocephalic, atraumatic  Eyes: Extraocular movements intact, eye lids normal  Lungs: Respirations unlabored, chest wall no deformity  ENT: Normal external ear canals, no sinus tenderness  Heart: Regular rate rhythm  Abdomen: No masses, tenderness  Extremities: No cyanosis or edema, 2+ pulses  Musculoskeletal: Normal range of motion in all joints  Skin: Intact, normal skin color    Neurological examination:    Mental status   Alert and oriented; intact memory with no confusion, speech or language problems; no hallucinations or delusions     Cranial nerves   II - visual fields intact to confrontation                                                III, IV, VI - extra-ocular muscles full: no pupillary defect; no ANALI, no nystagmus, no ptosis   V - normal facial sensation                                                               VII - normal facial symmetry                                                             VIII - intact hearing                                                                             IX, X - symmetrical palate                                                                  XI - symmetrical shoulder shrug                                                       XII - midline tongue without atrophy or fasciculation     Motor function  Normal muscle bulk and tone; normal power 5/5, including fine motor movements     Sensory function Intact to touch, pin, vibration, proprioception     Cerebellar Intact fine motor movement. No involuntary movements or tremors     Reflex function Intact 2+ DTR and symmetric. Negative Babinski     Gait                  Normal station and gait       Lab Results   Component Value Date    LDLCHOLESTEROL 53 06/01/2021     No components found for: CHLPL  Lab Results   Component Value Date    TRIG 47 06/01/2021    TRIG 69 06/24/2020    TRIG 59 04/01/2019     Lab Results   Component Value Date    HDL 52 06/01/2021    HDL 46 06/24/2020    HDL 58 04/01/2019     No results found for: LDLCALC  No results found for: LABVLDL  Lab Results   Component Value Date    LABA1C 5.4 06/01/2021     Lab Results   Component Value Date     06/01/2021     Lab Results   Component Value Date    [id number] 744 02/13/2014      Neurological work up:  CT head  CTA head and neck  MRI brain   2 D echo     All of patient's labs were personally reviewed. All the imaging studies were personally reviewed and discussed with the patient. Assessment Recommendations:  Patient with intermittent dysphagia, etiology unclear    I recommend the patient complete a CK, Myasthenia Gravis Panel, and Myoglobin lab test to identify etiology of swallowing difficulty. If patient's condition is still unclear following the labs, I discussed the possibility of a muscle biopsy in the future    Follow up 3-4 weeks or sooner if symptoms worsen. Doris Escalera MD I would like to thank you for the consult. Please do not hesitate if you have any questions about the patient care. This note is created with the assistance of a speech-recognition program. While intending to generate a document that actually reflects the content of the visit, the document can still have some errors including those of syntax and sound a- like substitutions which may escape proofreading. In such instances, actual meaning can be extrapolated by contextual derivation.     Scribe Attestation:   By signing my name below, Karina Ruiz, attest that this documentation has been prepared under the direction and in the presence of Jeramy Perdomo MD.    Electronically Signed: Doyle Snow. 8/3/2021 12:33  Lindy Cox MD, personally performed the services described in this documentation. All medical record entries made by the scribe were at my direction and in my presence. I have reviewed the chart and discharge instructions (if applicable) and agree that the record reflects my personal performance and is accurate and complete.     Electronically Signed: Citlalli Castro 8/3/2021 12:33 PM    Diplomate, American Board of Psychiatry and Neurology  Diplomate, American Board of Clinical Neurophysiology  Diplomate, American Board of Epilepsy

## 2021-08-04 ENCOUNTER — HOSPITAL ENCOUNTER (OUTPATIENT)
Age: 61
Setting detail: SPECIMEN
Discharge: HOME | End: 2021-08-04
Payer: COMMERCIAL

## 2021-08-04 DIAGNOSIS — R13.19 ESOPHAGEAL DYSPHAGIA: ICD-10-CM

## 2021-08-04 LAB
MYOGLOBIN: 34 NG/ML (ref 28–72)
TOTAL CK: 139 U/L (ref 39–308)

## 2021-08-09 LAB
ACETYLCHOL BLOCK AB: 4 % (ref 0–26)
ACETYLCHOLINE BINDING ANTIBODY: 0.1 NMOL/L (ref 0–0.4)
STRIATED MUSCLE AB TITER: ABNORMAL
STRIATED MUSCLE AB, IGG SCREEN: DETECTED
TITIN ANTIBODY: 0.11 IV (ref 0–0.45)

## 2021-08-27 ENCOUNTER — OFFICE VISIT (OUTPATIENT)
Dept: PULMONOLOGY | Age: 61
End: 2021-08-27
Payer: COMMERCIAL

## 2021-08-27 VITALS
BODY MASS INDEX: 33.86 KG/M2 | HEART RATE: 66 BPM | DIASTOLIC BLOOD PRESSURE: 64 MMHG | WEIGHT: 250 LBS | OXYGEN SATURATION: 100 % | SYSTOLIC BLOOD PRESSURE: 122 MMHG | HEIGHT: 72 IN | RESPIRATION RATE: 18 BRPM

## 2021-08-27 DIAGNOSIS — E66.9 OBESITY (BMI 35.0-39.9 WITHOUT COMORBIDITY): ICD-10-CM

## 2021-08-27 DIAGNOSIS — K21.9 GASTROESOPHAGEAL REFLUX DISEASE WITHOUT ESOPHAGITIS: ICD-10-CM

## 2021-08-27 DIAGNOSIS — G47.33 OSA ON CPAP: Primary | ICD-10-CM

## 2021-08-27 DIAGNOSIS — Z99.89 OSA ON CPAP: Primary | ICD-10-CM

## 2021-08-27 DIAGNOSIS — N40.0 BENIGN PROSTATIC HYPERPLASIA WITHOUT LOWER URINARY TRACT SYMPTOMS: ICD-10-CM

## 2021-08-27 DIAGNOSIS — I10 ESSENTIAL HYPERTENSION: ICD-10-CM

## 2021-08-27 PROCEDURE — 1036F TOBACCO NON-USER: CPT | Performed by: INTERNAL MEDICINE

## 2021-08-27 PROCEDURE — G8417 CALC BMI ABV UP PARAM F/U: HCPCS | Performed by: INTERNAL MEDICINE

## 2021-08-27 PROCEDURE — 3017F COLORECTAL CA SCREEN DOC REV: CPT | Performed by: INTERNAL MEDICINE

## 2021-08-27 PROCEDURE — G8427 DOCREV CUR MEDS BY ELIG CLIN: HCPCS | Performed by: INTERNAL MEDICINE

## 2021-08-27 PROCEDURE — 99214 OFFICE O/P EST MOD 30 MIN: CPT | Performed by: INTERNAL MEDICINE

## 2021-08-27 NOTE — PROGRESS NOTES
Ngoc Mclaughlin  8/27/2021    Chief Complaint   Patient presents with    Sleep Apnea   Mr. Tammi Babcock i is known to have obstructive sleep apnea syndrome that is being successfully treated with a CPAP. CPAP was very effective in relieving the apnea improving daytime symptoms. He has some leakage from the mouth. For which she was prescribed to use chinstrap however the DME supplier has not been able to supply him the chinstrap. I gave her another prescription for chinstrap which will definitely benefit him. There is no nasal stuffiness no sinusitis. He has been able to lose weight. His blood pressure is under good control. No angina no PND    He has no diabetes. No pedal edema no esophageal flux disease. Sleep Medicine 2/22/2021 2/3/2020 8/5/2019 2/4/2019 8/2/2018 5/2/2018 4/23/2018   Sitting and reading 0 0 0 1 1 0 0   Watching TV 1 2 0 1 0 0 0   Sitting, inactive in a public place (e.g. a theatre or a meeting) 0 0 0 1 0 0 0   As a passenger in a car for an hour without a break 0 0 0 1 1 0 0   Lying down to rest in the afternoon when circumstances permit 2 0 2 2 1 0 0   Sitting and talking to someone 0 0 0 0 0 0 0   Sitting quietly after a lunch without alcohol 1 1 1 1 1 0 0   In a car, while stopped for a few minutes in traffic 0 0 0 0 0 0 0   Total score 4 3 3 7 4 0 0   Neck circumference - - - - - 42 -         Review of Systems review is assistant was conducted for all other system including upper and lower extremities. No additional information was obtained. General ROS: negative for - chills, fatigue, fever or weight loss  ENT ROS: negative for - headaches, oral lesions or sore throat  Cardiovascular ROS: no chest pain , orthopnea or pnd   Gastrointestinal ROS: no abdominal pain, change in bowel habits, or black or bloody stools  Skin - no rash   Neuro - no blurry vision , no loc .  No focal weakness   msk - no jt tenderness or swelling    Vascular - no claudication , rest completed and negative Lymphatic - complete and negative   Hematology - oncology - complete and negative   Allergy immunology - complete and negative    no burning or hematuria       LUNG CANCER SCREENING     1. CRITERIA MET    []     CT ORDERED  []      2. CRITERIA NOT MET   [x]      3. REFUSED                    []      Nonsmoker   REASON CRITERIA NOT MET     1. SMOKING LESS THAN 30 PY  []      2. AGE LESS THAN 55 or GREATER 77 YEARS  []      3. QUIT SMOKING 15 YEARS OR GREATER   []      4. RECENT CT WITH IN 11 MONTHS    []      5. LIFE EXPECTANCY < 5 YEARS   []      6. SIGNS  AND SYMPTOMS OF LUNG CANCER   []           Immunization   Immunization History   Administered Date(s) Administered    COVID-19, Moderna, PF, 100mcg/0.5mL 02/27/2021, 03/20/2021    Influenza A ([id number]) Vaccine PF IM 11/18/2009    Influenza Virus Vaccine 10/14/2014, 12/18/2015, 12/21/2016, 12/21/2017, 11/19/2018    Influenza, Koleen Rosaura, IM, (6 mo and older Fluzone, Flulaval, Fluarix and 3 yrs and older Afluria) 12/21/2016, 12/21/2017, 11/19/2018, 10/04/2019    Influenza, Quadv, adjuvanted, 65 yrs +, IM, PF (Fluad) 11/30/2020    Pneumococcal Conjugate 13-valent ([id number]) 11/19/2018    Pneumococcal Polysaccharide ([id number]) 12/19/2019    Tdap (Boostrix, Adacel) 06/26/2019        Pneumococcal Vaccine     [x] Up to date    [] Indicated   [] Refused  [] Contraindicated       Influenza Vaccine   [x] Up to date    [] Indicated   [] Refused  [] Contraindicated     He already had a COVID-19 vaccine. PAST MEDICAL HISTORY:         Diagnosis Date    Arthritis     neck    BPH (benign prostatic hyperplasia)     Class 2 severe obesity due to excess calories with serious comorbidity in adult [facility])     Obesity (BMI 30.0-34. 9) [E66.9]    Dysphagia     Erectile dysfunction     Full dentures     GERD (gastroesophageal reflux disease)     Hiatal hernia     Hyperlipidemia     Hypertension     YUMIKO on CPAP     PONV (postoperative nausea and vomiting) nauseated after last surgery    Unspecified sleep apnea     cpap nightly       Family History:       Problem Relation Age of Onset    Heart Disease Mother        SURGICAL HISTORY:   Past Surgical History:   Procedure Laterality Date    COLONOSCOPY      ENDOSCOPY, COLON, DIAGNOSTIC      ESOPHAGEAL MOTILITY STUDY N/A 3/17/2021    ESOPHAGEAL MOTILITY STUDY performed by Dave Gan MD at [address] N/A 7/16/2021    CYSTO  FULGURATION  EXCISION PENILE WARTS performed by Freddy Baer MD at [address] [place] [address] N/A 7/31/2018    SUPRA PUBIC LESIONS BIOPSY EXCISION performed by Freddy Baer MD at [facility] Bilateral 07/31/2018    SUPRA PUBIC LESIONS BIOPSY EXCISION (N/A )    THROAT SURGERY  2008    TONSILLECTOMY      TUMOR REMOVAL Right 09/23/2016    [facility]  right thigh   (benign)    UPPER GASTROINTESTINAL ENDOSCOPY N/A 12/21/2020    EGD BIOPSY performed by [facility] MD at [facility]              Not in a hospital admission. Allergies   Allergen Reactions    Pcn [Penicillins]      Patient states he was told he had allergy as a child. Has taken penicillin related medications in past with no problems. Social History     Tobacco Use   Smoking Status Never Smoker   Smokeless Tobacco Never Used     Prior to Admission medications    Medication Sig Start Date End Date Taking?  Authorizing Provider   pravastatin (PRAVACHOL) 40 MG tablet TAKE ONE TABLET BY MOUTH EVERY DAY 6/24/21  Yes Trina Rivera MD   loratadine (CLARITIN) 10 MG tablet TAKE ONE TABLET BY MOUTH EVERY DAY 6/21/21  Yes Trina Rivera MD   amLODIPine (NORVASC) 10 MG tablet TAKE ONE TABLET BY MOUTH EVERY DAY 6/21/21  Yes Trina Rivera MD   valsartan-hydroCHLOROthiazide (DIOVAN-HCT) 160-12.5 MG per tablet TAKE ONE TABLET BY MOUTH EVERY DAY 6/21/21  Yes Trina Rivera MD   omeprazole (PRILOSEC) 20 MG delayed release capsule Take 1 capsule by mouth daily Take 45 minutes prior to all other medications 6/8/21  Yes Alison Jarrell MD   potassium chloride (KLOR-CON M) 20 MEQ extended release tablet TAKE ONE TABLET BY MOUTH EVERY DAY 3/15/21  Yes Natali Buenrostro MD   spironolactone (ALDACTONE) 50 MG tablet TAKE ONE TABLET BY MOUTH EVERY DAY 11/2/20  Yes Natali Buenrostro MD   fluticasone (FLONASE) 50 MCG/ACT nasal spray 1 spray by Each Nostril route daily  Patient taking differently: 1 spray by Each Nostril route daily Indications: Patient using prn  7/17/20  Yes Natali Buenrostro MD   azelastine (ASTELIN) 0.1 % nasal spray 1 spray by Nasal route 2 times daily Use in each nostril as directed  Patient taking differently: 1 spray by Nasal route 2 times daily Indications: Patient using PRN Use in each nostril as directed 7/17/20  Yes Natali Buenrostro MD   tamsulosin (FLOMAX) 0.4 MG capsule Take 0.4 mg by mouth daily   Yes José Heck MD         Physical Exam  General Appearance:    Alert, cooperative, no distress, appears stated age, Obesity [place]. Distress, very cooperative not sleeping not using any accessory muscles   Head:    Normocephalic, without obvious abnormality, atraumatic   Allergies no polyps. No sinus tenderness noted. No aspirin intolerance    Throat examination is unremarkable. Ear examination is normal.    Eyes do not reveal any jaundice or Elías's syndrome                :    Neck:   Supple, symmetrical, trachea midline, no adenopathy;     thyroid:  no enlargement/tenderness/nodules; no carotid    bruit or JVD   Back:     Symmetric, no curvature, ROM normal, no CVA tenderness   Lungs:      AP diameter is not increased. Percussion note is resonant breathing vesicular. Expiration is not prolonged. No rales, rhonchi are audible. No pleural friction rub is audible.        Chest Wall:    No tenderness or deformity      Heart:    Regular rate and rhythm, S1 and S2 normal, no murmur, rub        or gallop no rvh                           Abdomen: Pulses:                                            Lymph nodes:                    Neurologic:                  Soft, non-tender, bowel sounds active all four quadrants,     no masses, no organomegaly         2+ and symmetric all extremities            Cervical, supraclavicular not enlarged or matted or tender      CNII-XII intact, normal strength 5/5 . Sensation grossly normal  and reflexes normal 2+  throughout     Clubbing No  Lower ext edema No1+   [] , 2 +  [] , 3+   []  Upper ext edema No       Musculoskeletal - no joint swelling or tenderness or synovitis               /64 (Site: Left Upper Arm, Position: Sitting, Cuff Size: Medium Adult)   Pulse 66   Resp 18   Ht 6' (1.829 m)   Wt 250 lb (113.4 kg)   SpO2 100%   BMI 33.91 kg/m²     CXR  No recent chest x-ray      CT Scans  Syndrome. CT scan    Echo  No echocardiogram        Assessment  Sleep apnea syndrome   systemic hypertension   obesity   benign prostatic hypertrophy  Acid reflux  Plan:  Sleep apnea responding well to the use of CPAP is not sleepy tired fatigue during the daytime. Sleep is refreshing. No morning headaches. No nasal stuffiness sinusitis. Does not have to wake up at night frequently. He has a he is progressively losing weight successfully    No acid reflux. Blood pressure has been under good control. I gave him another prescription for chinstrap which has helped leakage from the mouth and make the CPAP more effective. He already had Covid vaccine.     Advised to get flu vaccine in the fall  He is not a candidate for lung cancer screening  Dictated by Dr. Abdi Vergara MD dictation over thank you

## 2021-09-07 ENCOUNTER — OFFICE VISIT (OUTPATIENT)
Dept: INTERNAL MEDICINE CLINIC | Age: 61
End: 2021-09-07
Payer: COMMERCIAL

## 2021-09-07 VITALS
OXYGEN SATURATION: 100 % | RESPIRATION RATE: 18 BRPM | TEMPERATURE: 98.1 F | BODY MASS INDEX: 34.35 KG/M2 | SYSTOLIC BLOOD PRESSURE: 110 MMHG | WEIGHT: 253.6 LBS | HEART RATE: 68 BPM | DIASTOLIC BLOOD PRESSURE: 66 MMHG | HEIGHT: 72 IN

## 2021-09-07 DIAGNOSIS — R13.12 OROPHARYNGEAL DYSPHAGIA: ICD-10-CM

## 2021-09-07 DIAGNOSIS — N52.01 ERECTILE DYSFUNCTION DUE TO ARTERIAL INSUFFICIENCY: ICD-10-CM

## 2021-09-07 DIAGNOSIS — G47.33 OSA ON CPAP: ICD-10-CM

## 2021-09-07 DIAGNOSIS — K21.9 GASTROESOPHAGEAL REFLUX DISEASE WITHOUT ESOPHAGITIS: ICD-10-CM

## 2021-09-07 DIAGNOSIS — M67.919 TENDINOPATHY OF ROTATOR CUFF, UNSPECIFIED LATERALITY: ICD-10-CM

## 2021-09-07 DIAGNOSIS — M50.90 DISORDER OF INTERVERTEBRAL DISC OF CERVICAL SPINE: Primary | ICD-10-CM

## 2021-09-07 DIAGNOSIS — N18.30 STAGE 3 CHRONIC KIDNEY DISEASE, UNSPECIFIED WHETHER STAGE 3A OR 3B CKD (HCC): ICD-10-CM

## 2021-09-07 DIAGNOSIS — J30.1 SEASONAL ALLERGIC RHINITIS DUE TO POLLEN: ICD-10-CM

## 2021-09-07 DIAGNOSIS — Z99.89 OSA ON CPAP: ICD-10-CM

## 2021-09-07 DIAGNOSIS — F45.8 ANXIETY HYPERVENTILATION: ICD-10-CM

## 2021-09-07 DIAGNOSIS — D36.7 BENIGN NEOPLASM OF LOWER EXTREMITY: ICD-10-CM

## 2021-09-07 DIAGNOSIS — E78.2 MIXED HYPERLIPIDEMIA: ICD-10-CM

## 2021-09-07 DIAGNOSIS — N40.0 BENIGN PROSTATIC HYPERPLASIA WITHOUT LOWER URINARY TRACT SYMPTOMS: ICD-10-CM

## 2021-09-07 DIAGNOSIS — F41.9 ANXIETY HYPERVENTILATION: ICD-10-CM

## 2021-09-07 DIAGNOSIS — I10 ESSENTIAL HYPERTENSION: ICD-10-CM

## 2021-09-07 PROBLEM — A04.8 POSITIVE HELICOBACTER PYLORI TEST: Status: RESOLVED | Noted: 2021-03-01 | Resolved: 2021-09-07

## 2021-09-07 PROBLEM — E66.9 OBESITY (BMI 35.0-39.9 WITHOUT COMORBIDITY): Status: RESOLVED | Noted: 2019-03-26 | Resolved: 2021-09-07

## 2021-09-07 PROCEDURE — 90471 IMMUNIZATION ADMIN: CPT | Performed by: FAMILY MEDICINE

## 2021-09-07 PROCEDURE — G8417 CALC BMI ABV UP PARAM F/U: HCPCS | Performed by: FAMILY MEDICINE

## 2021-09-07 PROCEDURE — 3017F COLORECTAL CA SCREEN DOC REV: CPT | Performed by: FAMILY MEDICINE

## 2021-09-07 PROCEDURE — 90674 CCIIV4 VAC NO PRSV 0.5 ML IM: CPT | Performed by: FAMILY MEDICINE

## 2021-09-07 PROCEDURE — 99214 OFFICE O/P EST MOD 30 MIN: CPT | Performed by: FAMILY MEDICINE

## 2021-09-07 PROCEDURE — 1036F TOBACCO NON-USER: CPT | Performed by: FAMILY MEDICINE

## 2021-09-07 PROCEDURE — G8427 DOCREV CUR MEDS BY ELIG CLIN: HCPCS | Performed by: FAMILY MEDICINE

## 2021-09-07 ASSESSMENT — PATIENT HEALTH QUESTIONNAIRE - PHQ9
SUM OF ALL RESPONSES TO PHQ QUESTIONS 1-9: 0
1. LITTLE INTEREST OR PLEASURE IN DOING THINGS: 0
SUM OF ALL RESPONSES TO PHQ9 QUESTIONS 1 & 2: 0
2. FEELING DOWN, DEPRESSED OR HOPELESS: 0

## 2021-09-07 ASSESSMENT — ENCOUNTER SYMPTOMS
RESPIRATORY NEGATIVE: 1
EYES NEGATIVE: 1
GASTROINTESTINAL NEGATIVE: 1
ALLERGIC/IMMUNOLOGIC NEGATIVE: 1

## 2021-09-07 NOTE — PROGRESS NOTES
Nose: Nose normal.      Comments: Allergy, allergic rhinitis     Mouth/Throat:      Comments: Oropharyngeal dysphagia of indeterminate etiology  Eyes:      Conjunctiva/sclera: Conjunctivae normal.      Pupils: Pupils are equal, round, and reactive to light. Cardiovascular:      Rate and Rhythm: Normal rate and regular rhythm. Heart sounds: Normal heart sounds. Comments: Hypertension  Dyslipidemia  Pulmonary:      Effort: Pulmonary effort is normal.      Breath sounds: Normal breath sounds. Abdominal:      General: Bowel sounds are normal.      Palpations: Abdomen is soft. Comments: GERD  Recently treated for H. pylori by GI status post EGD   Genitourinary:     Comments: CKD 3  BPH  Musculoskeletal:         General: Normal range of motion. Cervical back: Normal range of motion and neck supple. Comments: Discogenic disease of cervical spine as seen on MRI done in February 2021 showing broad-based C6-C7 disc protrusion with moderate spinal canal compromise   Skin:     General: Skin is warm and dry. Neurological:      Mental Status: He is alert and oriented to person, place, and time. Deep Tendon Reflexes: Reflexes are normal and symmetric. Psychiatric:      Comments: Anxious         Assessment:       Diagnosis Orders   1. Disorder of intervertebral disc of cervical spine     2. YUMIKO on CPAP     3. Benign prostatic hyperplasia without lower urinary tract symptoms     4. Erectile dysfunction due to arterial insufficiency     5. Gastroesophageal reflux disease without esophagitis     6. Essential hypertension     7. Mixed hyperlipidemia     8. Tendinopathy of rotator cuff, unspecified laterality     9. Anxiety hyperventilation     10. Benign neoplasm of lower extremity     11. Seasonal allergic rhinitis due to pollen     12. Oropharyngeal dysphagia     13.  Stage 3 chronic kidney disease, unspecified whether stage 3a or 3b CKD ([facility] [address]             Plan:      56year-old very anxious -American male is presented for follow-up. Ongoing oropharyngeal investigation. He has been seen by GI status post EGD and biopsy and treated for H. pylori. He continues to take omeprazole. However he continues to be symptomatic and consume soft mechanical diet that he is tolerating well. Caution aspiration  Is also been investigated by ENT. He is having second opinion ongoing with [facility] neurologist  MRI of the cervical spine done in February 2021 showed discogenic disease with broad-based C6/C7 disc prolapse with moderate canal compromise. Patient states that he continues to have tingling and numbness in C6 distribution, however, states it is significantly improved following physical therapy. He is advised to discuss with neurology if that has any bearing on his dysphagia. Neurosurgery consultation may be another option  Allergies allergic rhinitis. Continue Flonase, nasal saline, antihistamine  Hypertension well-controlled to Aldactone, Diovan HCT, amlodipine. Hyperlipidemia on pravastatin. Weight loss secondary to dysphagia. Sleep apnea on CPAP  CKD 3 with creatinine 1. 11. Avoid nephrotoxic drugs, anti-inflammatory radiocontrast.  Maintain oral hydration and urine output  Med list and available labs reviewed, discussed with patient, questions answered  He is updated on COVID-19 and flu vaccination  Call for any concern  This note is created with a voice recognition program and while intend to generate a document that accurately reflects the content of the visit, no guarantee can be provided that every mistake has been identified and corrected by editing.           Belvie Jeans, MD

## 2021-09-10 DIAGNOSIS — A04.8 H. PYLORI INFECTION: ICD-10-CM

## 2021-09-10 RX ORDER — OMEPRAZOLE 20 MG/1
CAPSULE, DELAYED RELEASE ORAL
Qty: 30 CAPSULE | Refills: 2 | Status: SHIPPED | OUTPATIENT
Start: 2021-09-10 | End: 2021-10-22 | Stop reason: SDUPTHER

## 2021-09-24 DIAGNOSIS — I10 ESSENTIAL HYPERTENSION: ICD-10-CM

## 2021-09-24 RX ORDER — AMLODIPINE BESYLATE 10 MG/1
TABLET ORAL
Qty: 90 TABLET | Refills: 1 | OUTPATIENT
Start: 2021-09-24

## 2021-09-24 NOTE — TELEPHONE ENCOUNTER
Manus Flurry is calling to request a refill on the following medication(s):    Medication Request:  Requested Prescriptions     Pending Prescriptions Disp Refills    amLODIPine (NORVASC) 10 MG tablet [Pharmacy Med Name: amlodipine 10 mg tablet] 90 tablet 1     Sig: TAKE ONE TABLET BY MOUTH EVERY DAY     Last filled 6/21/21 90 day 1 refill  Not due    Last Visit Date (If Applicable):  1/4/6276    Next Visit Date:    12/6/2021

## 2021-09-29 NOTE — TELEPHONE ENCOUNTER
Call placed to Mr. Lauro Lesch and this information was given. Patient is agreeable to try the Mestinon. Patient did say that he can't swallow pills so the Mestinon needs to be crushed or come in liquid form. I told him I would check in to this and let him know. Call placed back to the patient and I informed him that the medication does come in liquid form. I told him I would place the order and send it to Dr. Doretha Cortes for his approval.  Once approved the Rx will go electronically to the pharmacy. Patient also advised that I already contacted his pharmacy and they advised that they will have to order the medication once they receive the Rx. Mr. Lauro Lesch is scheduled to see Dr. Doretha Cortes on 10/13/21. Patient advised to contact the office if there were any problems. Mr. Lauro Lesch verbally stated his understanding.

## 2021-09-29 NOTE — TELEPHONE ENCOUNTER
----- Message from Obed Carter MD sent at 9/22/2021  3:24 PM EDT -----  1 of patient's lab is borderline positive. Although this is a nonspecific test.  A possibility of myasthenia gravis cannot be excluded. I suggest to try Mestinon 60 mg 3 times daily and follow-up with me next 2 weeks. If any side effects, please call my office sooner.

## 2021-09-30 RX ORDER — PYRIDOSTIGMINE BROMIDE 60 MG/5ML
60 SOLUTION ORAL 3 TIMES DAILY
Qty: 450 ML | Refills: 1 | Status: SHIPPED | OUTPATIENT
Start: 2021-09-30 | End: 2021-11-24

## 2021-10-12 ENCOUNTER — OFFICE VISIT (OUTPATIENT)
Dept: GASTROENTEROLOGY | Age: 61
End: 2021-10-12
Payer: COMMERCIAL

## 2021-10-12 VITALS — BODY MASS INDEX: 34.58 KG/M2 | DIASTOLIC BLOOD PRESSURE: 70 MMHG | WEIGHT: 255 LBS | SYSTOLIC BLOOD PRESSURE: 134 MMHG

## 2021-10-12 DIAGNOSIS — R13.12 OROPHARYNGEAL DYSPHAGIA: Primary | ICD-10-CM

## 2021-10-12 PROCEDURE — 1036F TOBACCO NON-USER: CPT | Performed by: INTERNAL MEDICINE

## 2021-10-12 PROCEDURE — G8427 DOCREV CUR MEDS BY ELIG CLIN: HCPCS | Performed by: INTERNAL MEDICINE

## 2021-10-12 PROCEDURE — 3017F COLORECTAL CA SCREEN DOC REV: CPT | Performed by: INTERNAL MEDICINE

## 2021-10-12 PROCEDURE — 99213 OFFICE O/P EST LOW 20 MIN: CPT | Performed by: INTERNAL MEDICINE

## 2021-10-12 PROCEDURE — G8417 CALC BMI ABV UP PARAM F/U: HCPCS | Performed by: INTERNAL MEDICINE

## 2021-10-12 PROCEDURE — G8482 FLU IMMUNIZE ORDER/ADMIN: HCPCS | Performed by: INTERNAL MEDICINE

## 2021-10-12 ASSESSMENT — ENCOUNTER SYMPTOMS
GASTROINTESTINAL NEGATIVE: 1
ALLERGIC/IMMUNOLOGIC NEGATIVE: 1
RESPIRATORY NEGATIVE: 1

## 2021-10-12 NOTE — PROGRESS NOTES
GI FOLLOW UP    INTERVAL HISTORY:     Oropharyngeal dysphagia-significantly include with pyridostigmine bromide  Suspected to have myasthenia gravis    Chief Complaint   Patient presents with    Follow-up     dysphagia follow up    Dysphagia     Patient states in the past week he started Pyridostrigmine Bromide (for myasthenia gravis). Patient states he has been eating and swallowing foods better now. 1. Oropharyngeal dysphagia          HISTORY OF PRESENT ILLNESS: Jaret Hutton is a 56 y. o. 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  Patient reports that he had scrambled eggs and toast and since then he has been having difficulty swallowing localized to the oropharyngeal area.  Denies any globus sensation.  Denies any regurgitation or chest symptoms.  No other GI symptoms.  Patient reports that he is able to tolerate liquids with more thicker consistency currently which is improvement from previous.  Has yet to try solid food due to fear of dysphagia.  Denies any neurological symptoms that might suggest a possible TIA or CVA.     2007--throat surgery-- resection of benign growth around vocal cords.      Smoker: none   Drinking history: 10 months quit, social  Abdominal surgeries: none   Prior Colonoscopy: 2017--next in 10 yrs  Prior EGD: 2010 dysphagia symptoms at that time  FH of GI issues: none     12 days ago. Liquids can tolerate.     Past Medical,Family, and Social History reviewed and does contribute to the patient presenting condition.     Patient's PMH/PSH,SH,PSYCH Hx, MEDs, ALLERGIES, and ROS were all reviewed and updated in the appropriate sections. PAST MEDICAL HISTORY:  Past Medical History:   Diagnosis Date    Arthritis     neck    BPH (benign prostatic hyperplasia)     Class 2 severe obesity due to excess calories with serious comorbidity in adult [facility])     Obesity (BMI 30.0-34. 9) [E66.9]    Dysphagia     Erectile dysfunction     Full dentures     GERD (gastroesophageal reflux disease)     Hiatal hernia     Hyperlipidemia     Hypertension     YUMIKO on CPAP     PONV (postoperative nausea and vomiting)     nauseated after last surgery    Unspecified sleep apnea     cpap nightly       Past Surgical History:   Procedure Laterality Date    COLONOSCOPY      ENDOSCOPY, COLON, DIAGNOSTIC      ESOPHAGEAL MOTILITY STUDY N/A 3/17/2021    ESOPHAGEAL MOTILITY STUDY performed by Corey Adames MD at [address] N/A 7/16/2021    CYSTO  FULGURATION  EXCISION PENILE WARTS performed by Gallo Castillo MD at [address] [place] [address] N/A 7/31/2018    SUPRA PUBIC LESIONS BIOPSY EXCISION performed by Gallo Castillo MD at [facility] 07/31/2018    SUPRA PUBIC LESIONS BIOPSY EXCISION (N/A )    THROAT SURGERY  2008    TONSILLECTOMY      TUMOR REMOVAL Right 09/23/2016    [facility]  right thigh   (benign)    UPPER GASTROINTESTINAL ENDOSCOPY N/A 12/21/2020    EGD BIOPSY performed by Corey Adames MD at [facility]       CURRENT MEDICATIONS:    Current Outpatient Medications:     Pyridostigmine Bromide 60 MG/5ML SOLN, Take 5 mLs by mouth 3 times daily, Disp: 450 mL, Rfl: 1    omeprazole (PRILOSEC) 20 MG delayed release capsule, TAKE ONE CAPSULE BY MOUTH DAILY.  TAKE 45 MINUTES PRIOR TO ALL OTHER MEDICATIONS., Disp: 30 capsule, Rfl: 2    pravastatin (PRAVACHOL) 40 MG tablet, TAKE ONE TABLET BY MOUTH EVERY DAY, Disp: 90 tablet, Rfl: 1    loratadine (CLARITIN) 10 MG tablet, TAKE ONE TABLET BY MOUTH EVERY DAY, Disp: 90 tablet, Rfl: 1    amLODIPine (NORVASC) 10 MG tablet, TAKE ONE TABLET BY MOUTH EVERY DAY, Disp: 90 tablet, Rfl: 1    valsartan-hydroCHLOROthiazide (DIOVAN-HCT) 160-12.5 MG per tablet, TAKE ONE TABLET BY MOUTH EVERY DAY, Disp: 90 tablet, Rfl: 1    potassium chloride (KLOR-CON M) 20 MEQ extended release tablet, TAKE ONE TABLET BY MOUTH EVERY DAY, Disp: 90 tablet, Rfl: 1    spironolactone (ALDACTONE) 50 MG tablet, TAKE ONE TABLET BY MOUTH EVERY DAY, Disp: 90 tablet, Rfl: 0    fluticasone (FLONASE) 50 MCG/ACT nasal spray, 1 spray by Each Nostril route daily (Patient taking differently: 1 spray by Each Nostril route daily Indications: Patient using prn ), Disp: 2 Bottle, Rfl: 1    azelastine (ASTELIN) 0.1 % nasal spray, 1 spray by Nasal route 2 times daily Use in each nostril as directed (Patient taking differently: 1 spray by Nasal route 2 times daily Indications: Patient using PRN Use in each nostril as directed), Disp: 2 Bottle, Rfl: 1    tamsulosin (FLOMAX) 0.4 MG capsule, Take 0.4 mg by mouth daily, Disp: , Rfl:     ALLERGIES:   Allergies   Allergen Reactions    Pcn [Penicillins]      Patient states he was told he had allergy as a child. Has taken penicillin related medications in past with no problems.        FAMILY HISTORY:       Problem Relation Age of Onset    Heart Disease Mother          SOCIAL HISTORY:   Social History     Socioeconomic History    Marital status:      Spouse name: Not on file    Number of children: Not on file    Years of education: Not on file    Highest education level: Not on file   Occupational History    Not on file   Tobacco Use    Smoking status: Never Smoker    Smokeless tobacco: Never Used   Vaping Use    Vaping Use: Never used   Substance and Sexual Activity    Alcohol use: Not Currently    Drug use: No    Sexual activity: Not on file   Other Topics Concern    Not on file   Social History Narrative    Not on file     Social Determinants of Health     Financial Resource Strain: Low Risk Psychiatric/Behavioral: Negative. All other systems reviewed and are negative. PHYSICAL EXAMINATION: Vital signs reviewed per the nursing documentation. /70   Wt 255 lb (115.7 kg)   BMI 34.58 kg/m²   Body mass index is 34.58 kg/m². Physical Exam    Physical Exam   Constitutional: Patient is oriented to person, place, and time. Patient appears well-developed and well-nourished. HENT:   Head: Normocephalic and atraumatic. Eyes: Pupils are equal, round, and reactive to light. EOM are normal.   Neck: Normal range of motion. Neck supple. No JVD present. No tracheal deviation present. No thyromegaly present. Cardiovascular: Normal rate, regular rhythm, normal heart sounds and intact distal pulses. Pulmonary/Chest: Effort normal and breath sounds normal. No stridor. No respiratory distress. He has no wheezes. He has no rales. He exhibits no tenderness. Abdominal: Soft. Bowel sounds are normal. He exhibits no distension and no mass. There is no tenderness. There is no rebound and no guarding. No hernia. Musculoskeletal: Normal range of motion. Lymphadenopathy:    Patient has no cervical adenopathy. Neurological: Patient is alert and oriented to person, place, and time. Psychiatric: Patient has a normal mood and affect.  Patient behavior is normal.       LABORATORY DATA: Reviewed  Lab Results   Component Value Date    WBC 5.0 07/08/2021    HGB 12.9 (L) 07/08/2021    HCT 41.3 07/08/2021    MCV 86.6 07/08/2021     07/08/2021     07/08/2021    K 3.8 07/08/2021     07/08/2021    CO2 27 07/08/2021    BUN 13 07/08/2021    CREATININE 1.11 07/08/2021    LABPROT 7.9 05/09/2012    LABALBU 4.4 09/11/2020    BILITOT 0.90 09/11/2020    ALKPHOS 96 09/11/2020    AST 17 09/11/2020    ALT 14 09/11/2020         Lab Results   Component Value Date    RBC 4.77 07/08/2021    HGB 12.9 (L) 07/08/2021    MCV 86.6 07/08/2021    MCH 27.0 07/08/2021    MCHC 31.2 07/08/2021    RDW 13.3 recommended. May consider switching to Pepcid 20 mg twice daily. 2)  RTC in 3 months. 3) colorectal cancer screening. Colonoscopy in 2017--next in 2027    Thank you for allowing me to participate in the care of Mr. Ye Crawford. For any further questions please do not hesitate to contact me. I have reviewed and agree with the ROS entered by the MA/LPN from today's encounter documented in a separate note. Marisol Gonzalez MD, MPH   [facility]  Office #: [phone]    this note is created with the assistance of a speech recognition program.  While intending to generate a document that actually reflects the content of the visit, the document can still have some errors including those of syntax and sound a like substitutions which may escape proof reading. It such instances, actual meaning can be extrapolated by contextual diversion.

## 2021-10-13 ENCOUNTER — OFFICE VISIT (OUTPATIENT)
Dept: NEUROLOGY | Age: 61
End: 2021-10-13
Payer: COMMERCIAL

## 2021-10-13 VITALS
WEIGHT: 257 LBS | HEART RATE: 70 BPM | DIASTOLIC BLOOD PRESSURE: 65 MMHG | BODY MASS INDEX: 34.81 KG/M2 | TEMPERATURE: 97.9 F | HEIGHT: 72 IN | SYSTOLIC BLOOD PRESSURE: 128 MMHG

## 2021-10-13 DIAGNOSIS — G70.00 MYASTHENIA GRAVIS (HCC): Primary | ICD-10-CM

## 2021-10-13 PROCEDURE — 3017F COLORECTAL CA SCREEN DOC REV: CPT | Performed by: PSYCHIATRY & NEUROLOGY

## 2021-10-13 PROCEDURE — G8482 FLU IMMUNIZE ORDER/ADMIN: HCPCS | Performed by: PSYCHIATRY & NEUROLOGY

## 2021-10-13 PROCEDURE — G8417 CALC BMI ABV UP PARAM F/U: HCPCS | Performed by: PSYCHIATRY & NEUROLOGY

## 2021-10-13 PROCEDURE — 1036F TOBACCO NON-USER: CPT | Performed by: PSYCHIATRY & NEUROLOGY

## 2021-10-13 PROCEDURE — G8427 DOCREV CUR MEDS BY ELIG CLIN: HCPCS | Performed by: PSYCHIATRY & NEUROLOGY

## 2021-10-13 PROCEDURE — 99214 OFFICE O/P EST MOD 30 MIN: CPT | Performed by: PSYCHIATRY & NEUROLOGY

## 2021-10-13 NOTE — PROGRESS NOTES
[facility], [address], [address] [place] [address]  Ph: [phone] or [phone]  FAX: [phone]    Chief Complaint: Oropharyngeal Dysphagia, myasthenia gravis     Dear Laci Mascorro MD     I had the pleasure of seeing your patient today in neurology consultation for his symptoms. As you would recall Jesús Winkler is a 56 y.o. male. Patient has reports with a history of difficulty swallowing since November 2020. Patient has reported to have been seen by multiple specialist in the past and was referred to neurology. He has previously completed a swallow test which was unable to identify cause of difficulty. Overall, the patient admits his symptoms have not worsen or improved since November. Patient denies any additional symptoms including diplopia. Denies urine and bowel incontinence. He admits his weight has remained stable once he found foods he could eat with minimal difficulty. Patient admits he has previously experienced a pinched nerve in his right shoulder. Furthermore, he still experiences numbness and tingling in his fingers on the right hand thought to be caused by the injury. Today, the patient reports Pyridostigmine Bromide 5mL SOLN TID has been working well. Patient was taking Pyridostigmine Bromide 60 mg TID, but had difficulty swallowing the pill so he was switched to 5 mL solution. Patient has noticed 30% improvement in symptoms since initiation of medication. He states he is able to speak better and hold a conversation, prior to taking medication he was unable to. Patient denies pain, diarrhea, and other side effects of this medication. He reports he is eating softer, smaller foods with more ease. Patient reports droopiness of his eye has remained at baseline.        Past Medical History:   Diagnosis Date    Arthritis     neck    BPH (benign prostatic hyperplasia)     Class 2 severe obesity due to excess calories with serious comorbidity in adult [facility]     Obesity (BMI 30.0-34. 9) [E66.9]    Dysphagia     Erectile dysfunction     Full dentures     GERD (gastroesophageal reflux disease)     Hiatal hernia     Hyperlipidemia     Hypertension     YUMIKO on CPAP     PONV (postoperative nausea and vomiting)     nauseated after last surgery    Unspecified sleep apnea     cpap nightly     Past Surgical History:   Procedure Laterality Date    COLONOSCOPY      ENDOSCOPY, COLON, DIAGNOSTIC      ESOPHAGEAL MOTILITY STUDY N/A 3/17/2021    ESOPHAGEAL MOTILITY STUDY performed by Nakul Alejandre MD at [address] N/A 7/16/2021    CYSTO  FULGURATION  EXCISION PENILE WARTS performed by William Mota MD at [address] [place] [address] N/A 7/31/2018    SUPRA PUBIC LESIONS BIOPSY EXCISION performed by William Mota MD at [facility] 07/31/2018    SUPRA PUBIC LESIONS BIOPSY EXCISION (N/A )    THROAT SURGERY  2008    TONSILLECTOMY      TUMOR REMOVAL Right 09/23/2016    [facility]  right thigh   (benign)    UPPER GASTROINTESTINAL ENDOSCOPY N/A 12/21/2020    EGD BIOPSY performed by Nakul Alejandre MD at [address] [facility] [Penicillins]      Patient states he was told he had allergy as a child. Has taken penicillin related medications in past with no problems.      Family History   Problem Relation Age of Onset    Heart Disease Mother       Social History     Socioeconomic History    Marital status:      Spouse name: Not on file    Number of children: Not on file    Years of education: Not on file    Highest education level: Not on file   Occupational History    Not on file   Tobacco Use    Smoking status: Never Smoker    Smokeless tobacco: Never Used   Vaping Use    Vaping Use: Never used   Substance and Sexual Activity    Alcohol use: Not Currently    Drug use: No    Sexual activity: Not on file   Other Topics Concern    Not on file   Social History groomed. In no acute distress    Head: Normocephalic, atraumatic  Eyes: Extraocular movements intact, eye lids normal  Lungs: Respirations unlabored, chest wall no deformity  ENT: Normal external ear canals, no sinus tenderness  Heart: Regular rate rhythm  Abdomen: No masses, tenderness  Extremities: No cyanosis or edema, 2+ pulses  Musculoskeletal: Normal range of motion in all joints  Skin: Intact, normal skin color    Neurological examination:    Mental status   Alert and oriented; intact memory with no confusion, speech or language problems; no hallucinations or delusions     Cranial nerves   II - visual fields intact to confrontation                                                III, IV, VI - extra-ocular muscles full: no pupillary defect; no ANALI, no nystagmus, no ptosis   V - normal facial sensation                                                               VII - normal facial symmetry                                                             VIII - intact hearing                                                                             IX, X - symmetrical palate                                                                  XI - symmetrical shoulder shrug                                                       XII - midline tongue without atrophy or fasciculation     Motor function  Normal muscle bulk and tone; normal power 5/5, including fine motor movements     Sensory function Intact to touch, pin, vibration, proprioception     Cerebellar Intact fine motor movement. No involuntary movements or tremors     Reflex function Intact 2+ DTR and symmetric.  Negative Babinski     Gait                  Normal station and gait       Lab Results   Component Value Date    LDLCHOLESTEROL 53 06/01/2021     No components found for: CHLPL  Lab Results   Component Value Date    TRIG 47 06/01/2021    TRIG 69 06/24/2020    TRIG 59 04/01/2019     Lab Results   Component Value Date    HDL 52 06/01/2021    HDL 46 06/24/2020    HDL 58 04/01/2019     No results found for: LDLCALC  No results found for: LABVLDL  Lab Results   Component Value Date    LABA1C 5.4 06/01/2021     Lab Results   Component Value Date     06/01/2021     Lab Results   Component Value Date    ECQPRISZ21 697 02/13/2014        Neurological work up:  CK on 8/4/2021 was within normal limits at 139 U/L  Myasthenia Gravis Panel performed on 8/4/2021 was within normal limits at 0.1 nmol/L  Myoglobin performed on 8/4/2021 was within normal limits at 34 ng/mL    MRI Brain W WO Contrast on 02/03/2021: No acute intracranial abnormality. Mild chronic microvascular disease within the periventricular white matter. MRI Cervical Spine WO Contrast on 02/03/2021: Moderate-sized broad-based right paracentral disc protrusion at C5-6 in mild compression of the cervical cord on the right-side.  In addition, moderate central canal stenosis and moderate bilateral foraminal stenosis are identified at this level. Broad disc osteophyte complex at C6-C7 resulting in moderate central canal stenosis.  Severe left foraminal stenosis and moderate right foraminal  stenosis are additionally appreciated at this level. Small central disc protrusion at C4-C5 with associated mild right foraminal stenosis. Small central disc protrusion at C3-C4 with associated moderate left foraminal stenosis. Moderate left foraminal stenosis at C2-C3. All of patient's labs were personally reviewed. All the imaging studies were personally reviewed and discussed with the patient. Assessment Recommendations:  Patient with intermittent dysphagia, etiology unclear. Possible myasthenia gravis with positive striated muscle antibody titer    The patient reports a significant improvement of his symptoms with pyridostigmine. The patient is currently taking low-to-medium dose of Pyridostigmine Bromide at 5mL SOLN TID (60 mg TID) and has benefited from this dose.  I will increase Pyridostigmine Bromide 5 mL TID to 10 mL + 5 mL + 5 mL. I ask that the patient keep in touch with me every 5 days to monitor effectiveness of medication increase as I may continue to increase dose if consistent relief of symptoms is present. Patient verbalized understanding. I discussed with patient the possibility of obtaining a second opinion from a neuromuscular specialist at [facility]. I recommend we first monitor patient's progress with Pyridostigmine Bromide increase. If no improvement noted, we will proceed with second opinion from neuromuscular. Patient is agreeable with plan. I have provided the patient with information regarding patient's disease as per his request as he would like to inquire more information about myasthenia gravis. Patient to follow up with me in 2 months or sooner if symptoms worsen. Leigh Coyle MD I would like to thank you for the consult. Please do not hesitate if you have any questions about the patient care. This note is created with the assistance of a speech-recognition program. While intending to generate a document that actually reflects the content of the visit, the document can still have some errors including those of syntax and sound a- like substitutions which may escape proofreading. In such instances, actual meaning can be extrapolated by contextual derivation. Scribe Attestation:   By signing my name below, I, Lavonne Oscar, attest that this documentation has been prepared under the direction and in the presence of Cammie Mccartney MD.      Electronically Signed: Lavonne Oscar. 10/13/2021 11:33 AM      Physician Attestation:  Norma Bonilla MD, personally performed the services described in this documentation. All medical record entries made by the scribe were at my direction and in my presence.  I have reviewed the chart and discharge instructions (if applicable) and agree that the record reflects my personal performance and is accurate and complete.     Electronically Signed: Hesham Pozo 10/13/2021 11:34 AM    Diplomate, American Board of Psychiatry and Neurology  Diplomate, American Board of Clinical Neurophysiology  Diplomate, American Board of Epilepsy

## 2021-10-21 RX ORDER — LORATADINE 10 MG/1
TABLET ORAL
Qty: 90 TABLET | Refills: 1 | Status: SHIPPED | OUTPATIENT
Start: 2021-10-21 | End: 2022-05-17

## 2021-10-21 NOTE — TELEPHONE ENCOUNTER
Nam Maldonado is calling to request a refill on the following medication(s):    Medication Request:  Requested Prescriptions     Pending Prescriptions Disp Refills    loratadine (CLARITIN) 10 MG tablet [Pharmacy Med Name: loratadine 10 mg tablet] 90 tablet 1     Sig: TAKE ONE TABLET BY MOUTH EVERY DAY     Last filled 6/21/21 90 day 1 refill  Order pending    Last Visit Date (If Applicable):  6/5/7032    Next Visit Date:    12/6/2021

## 2021-10-22 ENCOUNTER — TELEPHONE (OUTPATIENT)
Dept: NEUROLOGY | Age: 61
End: 2021-10-22

## 2021-10-22 DIAGNOSIS — A04.8 H. PYLORI INFECTION: ICD-10-CM

## 2021-10-22 RX ORDER — OMEPRAZOLE 20 MG/1
CAPSULE, DELAYED RELEASE ORAL
Qty: 30 CAPSULE | Refills: 2 | Status: SHIPPED | OUTPATIENT
Start: 2021-10-22 | End: 2021-11-15

## 2021-10-22 NOTE — TELEPHONE ENCOUNTER
Cara Mcintyre called in to let you know he is doing ok on the increased dose of Pyridostigmine Bromide.

## 2021-10-22 NOTE — PROGRESS NOTES
Sending script to new pharmacy. Previous OOP cost at [facility] was too much for patient.

## 2021-10-25 ENCOUNTER — HOSPITAL ENCOUNTER (OUTPATIENT)
Dept: CT IMAGING | Facility: CLINIC | Age: 61
Discharge: HOME | End: 2021-10-27
Payer: COMMERCIAL

## 2021-10-25 DIAGNOSIS — C37 THYMOMA, MALIGNANT (HCC): ICD-10-CM

## 2021-10-25 PROCEDURE — 6360000004 HC RX CONTRAST MEDICATION: Performed by: PSYCHIATRY & NEUROLOGY

## 2021-10-25 PROCEDURE — 71260 CT THORAX DX C+: CPT

## 2021-10-25 PROCEDURE — 2580000003 HC RX 258: Performed by: PSYCHIATRY & NEUROLOGY

## 2021-10-25 RX ORDER — SODIUM CHLORIDE 0.9 % (FLUSH) 0.9 %
10 SYRINGE (ML) INJECTION PRN
Status: DISCONTINUED | OUTPATIENT
Start: 2021-10-25 | End: 2021-10-28 | Stop reason: HOSPADM

## 2021-10-25 RX ORDER — 0.9 % SODIUM CHLORIDE 0.9 %
70 INTRAVENOUS SOLUTION INTRAVENOUS ONCE
Status: COMPLETED | OUTPATIENT
Start: 2021-10-25 | End: 2021-10-25

## 2021-10-25 RX ADMIN — SODIUM CHLORIDE 70 ML: 9 INJECTION, SOLUTION INTRAVENOUS at 10:51

## 2021-10-25 RX ADMIN — IOPAMIDOL 80 ML: 755 INJECTION, SOLUTION INTRAVENOUS at 10:52

## 2021-10-25 RX ADMIN — Medication 10 ML: at 10:50

## 2021-11-13 DIAGNOSIS — A04.8 H. PYLORI INFECTION: ICD-10-CM

## 2021-11-15 RX ORDER — OMEPRAZOLE 20 MG/1
CAPSULE, DELAYED RELEASE ORAL
Qty: 30 CAPSULE | Refills: 2 | Status: SHIPPED | OUTPATIENT
Start: 2021-11-15 | End: 2022-02-09

## 2021-11-24 DIAGNOSIS — G70.00 MYASTHENIA GRAVIS (HCC): Primary | ICD-10-CM

## 2021-11-24 RX ORDER — PYRIDOSTIGMINE BROMIDE 60 MG/5ML
SOLUTION ORAL
Qty: 450 ML | Refills: 3 | Status: SHIPPED | OUTPATIENT
Start: 2021-11-24 | End: 2022-03-29

## 2021-12-06 ENCOUNTER — OFFICE VISIT (OUTPATIENT)
Dept: INTERNAL MEDICINE CLINIC | Age: 61
End: 2021-12-06
Payer: COMMERCIAL

## 2021-12-06 VITALS
DIASTOLIC BLOOD PRESSURE: 68 MMHG | HEART RATE: 62 BPM | SYSTOLIC BLOOD PRESSURE: 122 MMHG | HEIGHT: 72 IN | RESPIRATION RATE: 16 BRPM | TEMPERATURE: 98.2 F | BODY MASS INDEX: 35.46 KG/M2 | WEIGHT: 261.8 LBS

## 2021-12-06 DIAGNOSIS — D36.7 BENIGN NEOPLASM OF LOWER EXTREMITY: ICD-10-CM

## 2021-12-06 DIAGNOSIS — G70.00 MYASTHENIA GRAVIS (HCC): ICD-10-CM

## 2021-12-06 DIAGNOSIS — E78.2 MIXED HYPERLIPIDEMIA: ICD-10-CM

## 2021-12-06 DIAGNOSIS — Z99.89 OSA ON CPAP: ICD-10-CM

## 2021-12-06 DIAGNOSIS — G47.33 OSA ON CPAP: ICD-10-CM

## 2021-12-06 DIAGNOSIS — N40.0 BENIGN PROSTATIC HYPERPLASIA WITHOUT LOWER URINARY TRACT SYMPTOMS: Primary | ICD-10-CM

## 2021-12-06 DIAGNOSIS — J30.1 SEASONAL ALLERGIC RHINITIS DUE TO POLLEN: ICD-10-CM

## 2021-12-06 DIAGNOSIS — K21.9 GASTROESOPHAGEAL REFLUX DISEASE WITHOUT ESOPHAGITIS: ICD-10-CM

## 2021-12-06 DIAGNOSIS — M50.90 DISORDER OF INTERVERTEBRAL DISC OF CERVICAL SPINE: ICD-10-CM

## 2021-12-06 DIAGNOSIS — F41.9 ANXIETY HYPERVENTILATION: ICD-10-CM

## 2021-12-06 DIAGNOSIS — I10 HYPERTENSION, UNSPECIFIED TYPE: ICD-10-CM

## 2021-12-06 DIAGNOSIS — R13.12 OROPHARYNGEAL DYSPHAGIA: ICD-10-CM

## 2021-12-06 DIAGNOSIS — N52.01 ERECTILE DYSFUNCTION DUE TO ARTERIAL INSUFFICIENCY: ICD-10-CM

## 2021-12-06 DIAGNOSIS — N18.30 STAGE 3 CHRONIC KIDNEY DISEASE, UNSPECIFIED WHETHER STAGE 3A OR 3B CKD (HCC): ICD-10-CM

## 2021-12-06 DIAGNOSIS — M67.919 TENDINOPATHY OF ROTATOR CUFF, UNSPECIFIED LATERALITY: ICD-10-CM

## 2021-12-06 DIAGNOSIS — F45.8 ANXIETY HYPERVENTILATION: ICD-10-CM

## 2021-12-06 PROCEDURE — G8482 FLU IMMUNIZE ORDER/ADMIN: HCPCS | Performed by: FAMILY MEDICINE

## 2021-12-06 PROCEDURE — 3017F COLORECTAL CA SCREEN DOC REV: CPT | Performed by: FAMILY MEDICINE

## 2021-12-06 PROCEDURE — G8427 DOCREV CUR MEDS BY ELIG CLIN: HCPCS | Performed by: FAMILY MEDICINE

## 2021-12-06 PROCEDURE — 1036F TOBACCO NON-USER: CPT | Performed by: FAMILY MEDICINE

## 2021-12-06 PROCEDURE — 99214 OFFICE O/P EST MOD 30 MIN: CPT | Performed by: FAMILY MEDICINE

## 2021-12-06 PROCEDURE — G8417 CALC BMI ABV UP PARAM F/U: HCPCS | Performed by: FAMILY MEDICINE

## 2021-12-06 ASSESSMENT — ENCOUNTER SYMPTOMS
EYES NEGATIVE: 1
RESPIRATORY NEGATIVE: 1
ALLERGIC/IMMUNOLOGIC NEGATIVE: 1
GASTROINTESTINAL NEGATIVE: 1

## 2021-12-06 NOTE — PROGRESS NOTES
Subjective:      Patient ID: Johann Chavarria is a 59 y.o. male. Hypertension  This is a chronic problem. The current episode started more than 1 month ago. The problem has been gradually improving since onset. The problem is controlled. Associated symptoms include anxiety. Risk factors for coronary artery disease include stress, obesity, male gender and dyslipidemia. Past treatments include calcium channel blockers, angiotensin blockers and diuretics. The current treatment provides moderate improvement. There are no compliance problems. Identifiable causes of hypertension include sleep apnea. Review of Systems   Constitutional: Negative. HENT: Negative. Eyes: Negative. Respiratory: Negative. Cardiovascular: Negative. Gastrointestinal: Negative. Endocrine: Negative. Musculoskeletal: Positive for arthralgias. Skin: Negative. Allergic/Immunologic: Negative. Neurological: Positive for weakness. Hematological: Negative. Psychiatric/Behavioral: The patient is nervous/anxious. Past family and social history unremarkable. Diagnosis Orders   1. Benign prostatic hyperplasia without lower urinary tract symptoms     2. Erectile dysfunction due to arterial insufficiency     3. Gastroesophageal reflux disease without esophagitis     4. Hypertension, unspecified type     5. Mixed hyperlipidemia     6. Tendinopathy of rotator cuff, unspecified laterality     7. YUMIKO on CPAP     8. Anxiety hyperventilation     9. Benign neoplasm of lower extremity     10. Seasonal allergic rhinitis due to pollen     11. Disorder of intervertebral disc of cervical spine     12. Stage 3 chronic kidney disease, unspecified whether stage 3a or 3b CKD ([address]     13. Oropharyngeal dysphagia     14. Myasthenia gravis ([address]           Objective:   Physical Exam  Vitals and nursing note reviewed. Constitutional:       Appearance: He is well-developed.       Comments: Sleep apnea on CPAP   HENT:      Head: Normocephalic and atraumatic. Right Ear: External ear normal.      Left Ear: External ear normal.      Nose: Nose normal.      Comments: Allergies, allergic rhinitis  Eyes:      Conjunctiva/sclera: Conjunctivae normal.      Pupils: Pupils are equal, round, and reactive to light. Cardiovascular:      Rate and Rhythm: Normal rate and regular rhythm. Heart sounds: Normal heart sounds. Comments: Hypertension  Hyperlipidemia  Pulmonary:      Effort: Pulmonary effort is normal.      Breath sounds: Normal breath sounds. Abdominal:      General: Bowel sounds are normal.      Palpations: Abdomen is soft. Comments: Dysphagia unawareness on puréed food  GERD   Genitourinary:     Comments: BPH  CKD  Musculoskeletal:         General: Normal range of motion. Cervical back: Normal range of motion and neck supple. Comments: Degenerative polyarthralgia   Skin:     General: Skin is warm and dry. Neurological:      Mental Status: He is alert and oriented to person, place, and time. Deep Tendon Reflexes: Reflexes are normal and symmetric. Comments: Suspected myasthenia gravis   Psychiatric:      Comments: Anxious         Assessment:       Diagnosis Orders   1. Benign prostatic hyperplasia without lower urinary tract symptoms     2. Erectile dysfunction due to arterial insufficiency     3. Gastroesophageal reflux disease without esophagitis     4. Hypertension, unspecified type     5. Mixed hyperlipidemia     6. Tendinopathy of rotator cuff, unspecified laterality     7. YUMIKO on CPAP     8. Anxiety hyperventilation     9. Benign neoplasm of lower extremity     10. Seasonal allergic rhinitis due to pollen     11. Disorder of intervertebral disc of cervical spine     12. Stage 3 chronic kidney disease, unspecified whether stage 3a or 3b CKD ([address]     13. Oropharyngeal dysphagia     14.  Myasthenia gravis [facility])             Plan:      75-year-old pleasant -American male returns for follow-up. He does not voice any new distress, afebrile hemodynamically stable, clinical examination is benign. History of oropharyngeal dysphagia. He has been seen by team of gastroenterologist, ENT, neurology. He is being treated for suspected myasthenia gravis. Patient states that his symptoms started about a year ago and he does not feel any worsening however, he states that he does not feel any improvement either. He denies any sign of aspiration. However he feels that his hoarseness has improved to [place]. He is having ongoing investigation at [facility] and is scheduled to have repeat videofluoroscopy. He states that he also had laryngoscopy-unremarkable by ENT. History of GERD. He had unremarkable EGD done by GI. He is on proton pump inhibitor  Hypertension well controlled with Diovan HCT and amlodipine that he is tolerating well. Consume less than 2 g of salt a day  Hyperlipidemia on statin that he is tolerating well  Sleep apnea on CPAP  CKD with creatinine of 1.11. Avoid nephrotoxic drugs, anti-inflammatory radiocontrast.  Maintain oral hydration  He appears anxious heart denies being depressed  Degenerative polyarthralgia. May take over-the-counter Tylenol as needed  He is updating Covid vaccination including booster, influenza vaccination  Med list and available labs reviewed, discussed with patient, questions answered  This note is created with a voice recognition program and while intend to generate a document that accurately reflects the content of the visit, no guarantee can be provided that every mistake has been identified and corrected by editing.           Jon Pierson MD

## 2021-12-06 NOTE — PROGRESS NOTES
Patient here for a 3 month follow and follow up on GI, Neurology ct scan done by neurology at [address] [place] [address] in [facility]  No other concern

## 2021-12-20 DIAGNOSIS — I10 ESSENTIAL HYPERTENSION: ICD-10-CM

## 2021-12-20 RX ORDER — VALSARTAN AND HYDROCHLOROTHIAZIDE 160; 12.5 MG/1; MG/1
TABLET, FILM COATED ORAL
Qty: 90 TABLET | Refills: 1 | Status: SHIPPED | OUTPATIENT
Start: 2021-12-20 | End: 2022-05-25

## 2021-12-20 RX ORDER — PRAVASTATIN SODIUM 40 MG
TABLET ORAL
Qty: 90 TABLET | Refills: 1 | Status: SHIPPED | OUTPATIENT
Start: 2021-12-20 | End: 2022-05-25

## 2021-12-20 RX ORDER — AMLODIPINE BESYLATE 10 MG/1
TABLET ORAL
Qty: 90 TABLET | Refills: 1 | Status: SHIPPED | OUTPATIENT
Start: 2021-12-20 | End: 2022-05-10

## 2021-12-20 NOTE — TELEPHONE ENCOUNTER
Stacy Sebastian is calling to request a refill on the following medication(s):    Medication Request:  Requested Prescriptions     Pending Prescriptions Disp Refills    valsartan-hydroCHLOROthiazide (DIOVAN-HCT) 160-12.5 MG per tablet [Pharmacy Med Name: valsartan 160 mg-hydrochlorothiazide 12.5 mg tablet] 90 tablet 1     Sig: TAKE ONE TABLET BY MOUTH EVERY DAY    amLODIPine (NORVASC) 10 MG tablet [Pharmacy Med Name: amlodipine 10 mg tablet] 90 tablet 1     Sig: TAKE ONE TABLET BY MOUTH EVERY DAY    pravastatin (PRAVACHOL) 40 MG tablet [Pharmacy Med Name: pravastatin 40 mg tablet] 90 tablet 1     Sig: TAKE ONE TABLET BY MOUTH EVERY DAY       Last Visit Date (If Applicable):  35/0/7619    Next Visit Date:    4/6/2022

## 2021-12-28 ENCOUNTER — OFFICE VISIT (OUTPATIENT)
Dept: NEUROLOGY | Age: 61
End: 2021-12-28
Payer: COMMERCIAL

## 2021-12-28 VITALS
HEART RATE: 66 BPM | TEMPERATURE: 98.4 F | BODY MASS INDEX: 36.57 KG/M2 | HEIGHT: 72 IN | SYSTOLIC BLOOD PRESSURE: 151 MMHG | DIASTOLIC BLOOD PRESSURE: 84 MMHG | WEIGHT: 270 LBS

## 2021-12-28 DIAGNOSIS — G70.00 MYASTHENIA GRAVIS (HCC): Primary | ICD-10-CM

## 2021-12-28 PROCEDURE — G8427 DOCREV CUR MEDS BY ELIG CLIN: HCPCS | Performed by: PSYCHIATRY & NEUROLOGY

## 2021-12-28 PROCEDURE — 99214 OFFICE O/P EST MOD 30 MIN: CPT | Performed by: PSYCHIATRY & NEUROLOGY

## 2021-12-28 PROCEDURE — 1036F TOBACCO NON-USER: CPT | Performed by: PSYCHIATRY & NEUROLOGY

## 2021-12-28 PROCEDURE — G8482 FLU IMMUNIZE ORDER/ADMIN: HCPCS | Performed by: PSYCHIATRY & NEUROLOGY

## 2021-12-28 PROCEDURE — G8417 CALC BMI ABV UP PARAM F/U: HCPCS | Performed by: PSYCHIATRY & NEUROLOGY

## 2021-12-28 PROCEDURE — 3017F COLORECTAL CA SCREEN DOC REV: CPT | Performed by: PSYCHIATRY & NEUROLOGY

## 2021-12-28 NOTE — PROGRESS NOTES
[facility], [address], [address] [place] [address]  Ph: [phone] or [phone]  FAX: [phone]    Chief Complaint: Oropharyngeal Dysphagia, myasthenia gravis     Dear Mikey Luke MD     I had the pleasure of seeing your patient today in neurology consultation for his symptoms. As you would recall Shane Zimmer is a 59 y.o. male. Patient has reports with a history of difficulty swallowing since November 2020. Patient has reported to have been seen by multiple specialist in the past and was referred to neurology. He has previously completed a swallow test which was unable to identify cause of difficulty. Overall, the patient admits his symptoms have not worsen or improved since November. Patient denies any additional symptoms including diplopia. Denies urine and bowel incontinence. He admits his weight has remained stable once he found foods he could eat with minimal difficulty. He reports he is eating softer, smaller foods with more ease. Patient admits he has previously experienced a pinched nerve in his right shoulder. Furthermore, he still experiences numbness and tingling in his fingers on the right hand thought to be caused by the injury. Today, the patient reports to the office following visitation at [facility]. Patient reports his diagnosis was not consistent with myasthenia gravis at this time and is planning to see the [facility] for a second opinion. He reports that increased Pyridostigmine Bromide 60mg/5mL soln TID improved his speech, breathing, and swallowing. Admits to occasional stomach cramping as a possible side effect. Patient admits that his strength and walking is at baseline. He continues to experience some droopiness in his eyes and has some difficulty reading for an extended time. This is associated with intermittent blurred vision.        Past Medical History:   Diagnosis Date    Arthritis     neck    BPH (benign prostatic hyperplasia)     Class 2 severe obesity due to excess calories with serious comorbidity in adult [facility])     Obesity (BMI 30.0-34. 9) [E66.9]    Dysphagia     Erectile dysfunction     Full dentures     GERD (gastroesophageal reflux disease)     Hiatal hernia     Hyperlipidemia     Hypertension     YUMIKO on CPAP     PONV (postoperative nausea and vomiting)     nauseated after last surgery    Unspecified sleep apnea     cpap nightly     Past Surgical History:   Procedure Laterality Date    COLONOSCOPY      ENDOSCOPY, COLON, DIAGNOSTIC      ESOPHAGEAL MOTILITY STUDY N/A 3/17/2021    ESOPHAGEAL MOTILITY STUDY performed by Reynaldo Garvin MD at [address] N/A 7/16/2021    CYSTO  FULGURATION  EXCISION PENILE WARTS performed by Jd Morgan MD at [address] [place] [address] N/A 7/31/2018    SUPRA PUBIC LESIONS BIOPSY EXCISION performed by Jd Morgan MD at [facility] 07/31/2018    SUPRA PUBIC LESIONS BIOPSY EXCISION (N/A )    THROAT SURGERY  2008    TONSILLECTOMY      TUMOR REMOVAL Right 09/23/2016    [facility]  right thigh   (benign)    UPPER GASTROINTESTINAL ENDOSCOPY N/A 12/21/2020    EGD BIOPSY performed by Reynaldo Garvin MD at [facility] [[place]]      Patient states he was told he had allergy as a child. Has taken penicillin related medications in past with no problems.      Family History   Problem Relation Age of Onset    Heart Disease Mother       Social History     Socioeconomic History    Marital status:      Spouse name: Not on file    Number of children: Not on file    Years of education: Not on file    Highest education level: Not on file   Occupational History    Not on file   Tobacco Use    Smoking status: Never Smoker    Smokeless tobacco: Never Used   Vaping Use    Vaping Use: Never used   Substance and Sexual Activity    Alcohol use: Not Currently    Drug use: No    Sexual activity: Not on file   Other Topics Concern    Not on file   Social History Narrative    Not on file     Social Determinants of Health     Financial Resource Strain: Low Risk     Difficulty of Paying Living Expenses: Not hard at all   Food Insecurity: No Food Insecurity    Worried About Running Out of Food in the Last Year: Never true    [address] in the Last Year: Never true   Transportation Needs:     Lack of Transportation (Medical): Not on file    Lack of Transportation (Non-Medical):  Not on file   Physical Activity:     Days of Exercise per Week: Not on file    Minutes of Exercise per Session: Not on file   Stress:     Feeling of Stress : Not on file   Social Connections:     Frequency of Communication with Friends and Family: Not on file    Frequency of Social Gatherings with Friends and Family: Not on file    Attends Buddhism Services: Not on file    Active Member of [address] [place] [address] or Organizations: Not on file    Attends Club or Organization Meetings: Not on file    Marital Status: Not on file   Intimate Partner Violence:     Fear of Current or Ex-Partner: Not on file    Emotionally Abused: Not on file    Physically Abused: Not on file    Sexually Abused: Not on file   Housing Stability:     Unable to Pay for Housing in the Last Year: Not on file    Number of [place] in the Last Year: Not on file    Unstable Housing in the Last Year: Not on file      Temp 98.4 °F (36.9 °C)   Ht 6' (1.829 m)   Wt 270 lb (122.5 kg)   BMI 36.62 kg/m²      HEENT [] Hearing Loss  [] Visual Disturbance  [] Tinnitus  [] Eye pain   Respiratory [] Shortness of Breath  [] Cough  [] Snoring   Cardiovascular [] Chest Pain  [] Palpitations  [] Lightheaded   GI [] Constipation  [] Diarrhea  [x] Swallowing change  [] Nausea/vomiting    [] Urinary Frequency  [] Urinary Urgency   Musculoskeletal [] Neck pain  [] Back pain  [] Muscle pain  [] Restless legs   Dermatologic [] Skin changes Neurologic [] Memory loss/confusion  [] Seizures  [] Trouble walking or imbalance  [] Dizziness  [] Sleep disturbance  [] Weakness  [x] Numbness  [] Tremors  [] Speech Difficulty  [] Headaches  [] Light Sensitivity  [] Sound Sensitivity   Endocrinology []Excessive thirst  []Excessive hunger   Psychiatric [] Anxiety/Depression  [] Hallucination   Allergy/immunology []Hives/environmental allergies   Hematologic/lymph [] Abnormal bleeding  [] Abnormal bruising     General appearence: Normal. Well groomed.  In no acute distress    Head: Normocephalic, atraumatic  Eyes: Extraocular movements intact, eye lids normal  Lungs: Respirations unlabored, chest wall no deformity  ENT: Normal external ear canals, no sinus tenderness  Heart: Regular rate rhythm  Abdomen: No masses, tenderness  Extremities: No cyanosis or edema, 2+ pulses  Musculoskeletal: Normal range of motion in all joints  Skin: Intact, normal skin color    Neurological examination:    Mental status   Alert and oriented; intact memory with no confusion, speech or language problems; no hallucinations or delusions     Cranial nerves   II - visual fields intact to confrontation                                                III, IV, VI - extra-ocular muscles full: no pupillary defect; no ANALI, no nystagmus, no ptosis   V - normal facial sensation                                                               VII - normal facial symmetry                                                             VIII - intact hearing                                                                             IX, X - symmetrical palate                                                                  XI - symmetrical shoulder shrug                                                       XII - midline tongue without atrophy or fasciculation     Motor function  Normal muscle bulk and tone; normal power 5/5, including fine motor movements     Sensory function Intact to touch, pin, vibration, proprioception     Cerebellar Intact fine motor movement. No involuntary movements or tremors     Reflex function Intact 2+ DTR and symmetric. Negative Babinski     Gait                  Normal station and gait       Lab Results   Component Value Date    LDLCHOLESTEROL 53 06/01/2021     No components found for: CHLPL  Lab Results   Component Value Date    TRIG 47 06/01/2021    TRIG 69 06/24/2020    TRIG 59 04/01/2019     Lab Results   Component Value Date    HDL 52 06/01/2021    HDL 46 06/24/2020    HDL 58 04/01/2019     No results found for: LDLCALC  No results found for: LABVLDL  Lab Results   Component Value Date    LABA1C 5.4 06/01/2021     Lab Results   Component Value Date     06/01/2021     Lab Results   Component Value Date    QYHGGSZL10 705 02/13/2014        Neurological work up:  CK on 8/4/2021 was within normal limits at 139 U/L  Myasthenia Gravis Panel performed on 8/4/2021 was within normal limits at 0.1 nmol/L  Myoglobin performed on 8/4/2021 was within normal limits at 34 ng/mL    MRI Brain W WO Contrast on 02/03/2021: No acute intracranial abnormality. Mild chronic microvascular disease within the periventricular white matter. MRI Cervical Spine WO Contrast on 02/03/2021: Moderate-sized broad-based right paracentral disc protrusion at C5-6 in mild compression of the cervical cord on the right-side.  In addition, moderate central canal stenosis and moderate bilateral foraminal stenosis are identified at this level. Broad disc osteophyte complex at C6-C7 resulting in moderate central canal stenosis.  Severe left foraminal stenosis and moderate right foraminal  stenosis are additionally appreciated at this level. Small central disc protrusion at C4-C5 with associated mild right foraminal stenosis. Small central disc protrusion at C3-C4 with associated moderate left foraminal stenosis. Moderate left foraminal stenosis at C2-C3. All of patient's labs were personally reviewed.  All the imaging studies were personally reviewed and discussed with the patient. Assessment Recommendations:  Patient with intermittent dysphagia. Possible myasthenia gravis with positive striated muscle antibody titer    Following visitation with a neuromuscular specialist [facility], the patient tested negative for acetylcholine receptor antibodies, including MUSK antibodies. His speech, breathing, and swallowing has improved with increase Pyridostigmine Bromide. I discussed with him about initiating an immunosuppression steroid injection for continued management of symptoms. Patient would like to wait until after his visit at the [address] [place] [address]. I advised the patient to call me after completion of the swallow study. For now, I recommend the patient continue Pyridostigmine Bromide 60mg/5mL soln TID for management of symptoms. All medication side effects were discussed and questions answered. Patient to follow up in 5-6 months or sooner if symptoms worsen. Luciana Halsted, MD I would like to thank you for the consult. Please do not hesitate if you have any questions about the patient care. This note is created with the assistance of a speech-recognition program. While intending to generate a document that actually reflects the content of the visit, the document can still have some errors including those of syntax and sound a- like substitutions which may escape proofreading. In such instances, actual meaning can be extrapolated by contextual derivation. Scribe Attestation:   By signing my name below, Cassie Whitlock, attest that this documentation has been prepared under the direction and in the presence of Roger Calderon MD.    Electronically Signed: Michelle Lara. 12/28/21. 9:48 AM    Physician Attestation:  Anil Mills MD, personally performed the services described in this documentation. All medical record entries made by the scribe were at my direction and in my presence.  I have reviewed the chart and discharge instructions (if applicable) and agree that the record reflects my personal performance and is accurate and complete.     Electronically Signed: Nusrat Philip 12/28/2021 9:48 AM    Diplomate, American Board of Psychiatry and Neurology  Diplomate, American Board of Clinical Neurophysiology  Diplomate, American Board of Epilepsy

## 2022-01-04 ENCOUNTER — TELEPHONE (OUTPATIENT)
Dept: INTERNAL MEDICINE CLINIC | Age: 62
End: 2022-01-04

## 2022-01-04 DIAGNOSIS — Z01.818 PRE-OP TESTING: Primary | ICD-10-CM

## 2022-01-04 DIAGNOSIS — Z01.812 ENCOUNTER FOR PREOPERATIVE SCREENING LABORATORY TESTING FOR COVID-19 VIRUS: ICD-10-CM

## 2022-01-04 DIAGNOSIS — Z20.822 ENCOUNTER FOR PREOPERATIVE SCREENING LABORATORY TESTING FOR COVID-19 VIRUS: ICD-10-CM

## 2022-01-04 NOTE — TELEPHONE ENCOUNTER
Patient notified  Order placed, he is going to [facility] walk in [facility] on Friday

## 2022-01-07 ENCOUNTER — HOSPITAL ENCOUNTER (OUTPATIENT)
Age: 62
Setting detail: SPECIMEN
Discharge: HOME | End: 2022-01-07

## 2022-01-07 DIAGNOSIS — Z20.822 ENCOUNTER FOR PREOPERATIVE SCREENING LABORATORY TESTING FOR COVID-19 VIRUS: ICD-10-CM

## 2022-01-07 DIAGNOSIS — Z01.812 ENCOUNTER FOR PREOPERATIVE SCREENING LABORATORY TESTING FOR COVID-19 VIRUS: ICD-10-CM

## 2022-01-08 LAB
SARS-COV-2: NORMAL
SARS-COV-2: NOT DETECTED
SOURCE: NORMAL

## 2022-02-09 DIAGNOSIS — A04.8 H. PYLORI INFECTION: ICD-10-CM

## 2022-02-09 RX ORDER — OMEPRAZOLE 20 MG/1
CAPSULE, DELAYED RELEASE ORAL
Qty: 90 CAPSULE | Refills: 1 | Status: SHIPPED | OUTPATIENT
Start: 2022-02-09 | End: 2022-08-16 | Stop reason: ALTCHOICE

## 2022-02-15 ENCOUNTER — TELEPHONE (OUTPATIENT)
Dept: NEUROLOGY | Age: 62
End: 2022-02-15

## 2022-02-15 ENCOUNTER — OFFICE VISIT (OUTPATIENT)
Dept: GASTROENTEROLOGY | Age: 62
End: 2022-02-15
Payer: COMMERCIAL

## 2022-02-15 VITALS — SYSTOLIC BLOOD PRESSURE: 118 MMHG | WEIGHT: 269 LBS | BODY MASS INDEX: 36.48 KG/M2 | DIASTOLIC BLOOD PRESSURE: 67 MMHG

## 2022-02-15 DIAGNOSIS — R13.12 OROPHARYNGEAL DYSPHAGIA: Primary | ICD-10-CM

## 2022-02-15 DIAGNOSIS — R13.19 ESOPHAGEAL DYSPHAGIA: Primary | ICD-10-CM

## 2022-02-15 DIAGNOSIS — A04.8 H. PYLORI INFECTION: ICD-10-CM

## 2022-02-15 PROCEDURE — G8417 CALC BMI ABV UP PARAM F/U: HCPCS | Performed by: INTERNAL MEDICINE

## 2022-02-15 PROCEDURE — G8482 FLU IMMUNIZE ORDER/ADMIN: HCPCS | Performed by: INTERNAL MEDICINE

## 2022-02-15 PROCEDURE — 1036F TOBACCO NON-USER: CPT | Performed by: INTERNAL MEDICINE

## 2022-02-15 PROCEDURE — 99213 OFFICE O/P EST LOW 20 MIN: CPT | Performed by: INTERNAL MEDICINE

## 2022-02-15 PROCEDURE — 3017F COLORECTAL CA SCREEN DOC REV: CPT | Performed by: INTERNAL MEDICINE

## 2022-02-15 PROCEDURE — G8427 DOCREV CUR MEDS BY ELIG CLIN: HCPCS | Performed by: INTERNAL MEDICINE

## 2022-02-15 ASSESSMENT — ENCOUNTER SYMPTOMS
GASTROINTESTINAL NEGATIVE: 1
ALLERGIC/IMMUNOLOGIC NEGATIVE: 1
RESPIRATORY NEGATIVE: 1

## 2022-02-15 NOTE — TELEPHONE ENCOUNTER
Pt called in about his recent visit to [facility]. He said that they suggested we order a modified barium swallow study. He wants to have this done at [facility]. Can was place this order for him?

## 2022-02-15 NOTE — PROGRESS NOTES
GI FOLLOW UP    INTERVAL HISTORY:     Oropharyngeal dysphagia improving with myasthenia gravis medication      Chief Complaint   Patient presents with    Follow-up     oropharyngeal dysphagia            HISTORY OF PRESENT ILLNESS: Giselle Hutton is a 59 y. o. 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  Patient reports that he had scrambled eggs and toast and since then he has been having difficulty swallowing localized to the oropharyngeal area.  Denies any globus sensation.  Denies any regurgitation or chest symptoms.  No other GI symptoms.  Patient reports that he is able to tolerate liquids with more thicker consistency currently which is improvement from previous.  Has yet to try solid food due to fear of dysphagia.  Denies any neurological symptoms that might suggest a possible TIA or CVA.     2007--throat surgery-- resection of benign growth around vocal cords.      Smoker: none   Drinking history: 10 months quit, social  Abdominal surgeries: none   Prior Colonoscopy: 2017--next in 10 yrs  Prior EGD: 2010 dysphagia symptoms at that time  FH of GI issues: none    Past Medical,Family, and Social History reviewed and does contribute to the patient presenting condition. Patient's PMH/PSH,SH,PSYCH Hx, MEDs, ALLERGIES, and ROS were all reviewed and updated in the appropriate sections.     PAST MEDICAL HISTORY:  Past Medical History:   Diagnosis Date    Arthritis     neck    BPH (benign prostatic hyperplasia)     Class 2 severe obesity due to excess calories with serious comorbidity in adult [facility])     Obesity (BMI 30.0-34. 9) [E66.9]    Dysphagia     Erectile dysfunction     Full dentures     GERD (gastroesophageal reflux disease)     Hiatal hernia     Hyperlipidemia     Hypertension     YUMIKO on CPAP     PONV (postoperative nausea and vomiting)     nauseated after last surgery    Unspecified sleep apnea     cpap nightly       Past Surgical History:   Procedure Laterality Date    COLONOSCOPY      ENDOSCOPY, COLON, DIAGNOSTIC      ESOPHAGEAL MOTILITY STUDY N/A 3/17/2021    ESOPHAGEAL MOTILITY STUDY performed by Smith Payton MD at [address] N/A 7/16/2021    CYSTO  FULGURATION  EXCISION PENILE WARTS performed by Maddison Baumann MD at [address] N/A 7/31/2018    SUPRA PUBIC LESIONS BIOPSY EXCISION performed by Maddison Baumann MD at [address] Bilateral 07/31/2018    SUPRA PUBIC LESIONS BIOPSY EXCISION (N/A )    THROAT SURGERY  2008    TONSILLECTOMY      TUMOR REMOVAL Right 09/23/2016    [facility]  right thigh   (benign)    UPPER GASTROINTESTINAL ENDOSCOPY N/A 12/21/2020    EGD BIOPSY performed by Smith Payton MD at [facility]       CURRENT MEDICATIONS:    Current Outpatient Medications:     omeprazole (PRILOSEC) 20 MG delayed release capsule, TAKE ONE CAPSULE BY MOUTH DAILY.  TAKE 45 MINUTES PRIOR TO ALL OTHER MEDICATIONS., Disp: 90 capsule, Rfl: 1    valsartan-hydroCHLOROthiazide (DIOVAN-HCT) 160-12.5 MG per tablet, TAKE ONE TABLET BY MOUTH EVERY DAY, Disp: 90 tablet, Rfl: 1    amLODIPine (NORVASC) 10 MG tablet, TAKE ONE TABLET BY MOUTH EVERY DAY, Disp: 90 tablet, Rfl: 1    pravastatin (PRAVACHOL) 40 MG tablet, TAKE ONE TABLET BY MOUTH EVERY DAY, Disp: 90 tablet, Rfl: 1    Pyridostigmine Bromide 60 MG/5ML SOLN, TAKE 5 ML BY MOUTH THREE TIMES DAILY, Disp: 450 mL, Rfl: 3    loratadine (CLARITIN) 10 MG tablet, TAKE ONE TABLET BY MOUTH EVERY DAY, Disp: 90 tablet, Rfl: 1    potassium chloride (KLOR-CON M) 20 MEQ extended release tablet, TAKE ONE TABLET BY MOUTH EVERY DAY, Disp: 90 tablet, Rfl: 1    spironolactone (ALDACTONE) 50 MG tablet, TAKE ONE TABLET BY MOUTH EVERY DAY, Disp: 90 tablet, Rfl: 0    fluticasone (FLONASE) 50 MCG/ACT nasal spray, 1 spray by Each Nostril route daily (Patient taking differently: 1 spray by Each Nostril route daily Indications: Patient using prn ), Disp: 2 Bottle, Rfl: 1    azelastine (ASTELIN) 0.1 % nasal spray, 1 spray by Nasal route 2 times daily Use in each nostril as directed (Patient taking differently: 1 spray by Nasal route 2 times daily Indications: Patient using PRN Use in each nostril as directed), Disp: 2 Bottle, Rfl: 1    tamsulosin (FLOMAX) 0.4 MG capsule, Take 0.4 mg by mouth daily, Disp: , Rfl:     ALLERGIES:   Allergies   Allergen Reactions    Pcn [Penicillins]      Patient states he was told he had allergy as a child. Has taken penicillin related medications in past with no problems. FAMILY HISTORY:       Problem Relation Age of Onset    Heart Disease Mother          SOCIAL HISTORY:   Social History     Socioeconomic History    Marital status:      Spouse name: Not on file    Number of children: Not on file    Years of education: Not on file    Highest education level: Not on file   Occupational History    Not on file   Tobacco Use    Smoking status: Never Smoker    Smokeless tobacco: Never Used   Vaping Use    Vaping Use: Never used   Substance and Sexual Activity    Alcohol use: Not Currently    Drug use: No    Sexual activity: Not on file   Other Topics Concern    Not on file   Social History Narrative    Not on file     Social Determinants of Health     Financial Resource Strain: Low Risk     Difficulty of Paying Living Expenses: Not hard at all   Food Insecurity: No Food Insecurity    Worried About [employer] [employer] in the Last Year: Never true    [facility] in the Last Year: Never true   Transportation Needs:     Lack of Transportation (Medical):  Not on file  Lack of Transportation (Non-Medical): Not on file   Physical Activity:     Days of Exercise per Week: Not on file    Minutes of Exercise per Session: Not on file   Stress:     Feeling of Stress : Not on file   Social Connections:     Frequency of Communication with Friends and Family: Not on file    Frequency of Social Gatherings with Friends and Family: Not on file    Attends Mu-ism Services: Not on file    Active Member of [address] [place] [address] or Organizations: Not on file    Attends Club or Organization Meetings: Not on file    Marital Status: Not on file   Intimate Partner Violence:     Fear of Current or Ex-Partner: Not on file    Emotionally Abused: Not on file    Physically Abused: Not on file    Sexually Abused: Not on file   Housing Stability:     Unable to Pay for Housing in the Last Year: Not on file    Number of [place] in the Last Year: Not on file    Unstable Housing in the Last Year: Not on file       REVIEW OF SYSTEMS: A 12-point review of systems was obtained and pertinent positives and negatives were listed below. REVIEW OF SYSTEMS:     Constitutional: No fever, no chills, no lethargy, no weakness. HEENT:  No headache, otalgia, itchy eyes, nasal discharge or sore throat. Cardiac:  No chest pain, dyspnea, orthopnea or PND. Chest:   No cough, phlegm or wheezing. Abdomen:      Detailed by MA   Neuro:  No focal weakness, abnormal movements or seizure like activity. Skin:   No rashes, no itching. :   No hematuria, no pyuria, no dysuria, no flank pain. Extremities:  No swelling or joint pains. ROS was otherwise negative    Review of Systems   Constitutional: Negative. HENT: Negative. Eyes: Positive for visual disturbance (glasses ). Respiratory: Negative. Cardiovascular: Negative. Gastrointestinal: Negative. Endocrine: Negative. Genitourinary: Negative. Musculoskeletal: Negative. Skin: Negative. Allergic/Immunologic: Negative.     Neurological: Negative. Hematological: Negative. Psychiatric/Behavioral: Negative. PHYSICAL EXAMINATION: Vital signs reviewed per the nursing documentation. There were no vitals taken for this visit. There is no height or weight on file to calculate BMI. Physical Exam    Physical Exam   Constitutional: Patient is oriented to person, place, and time. Patient appears well-developed and well-nourished. HENT:   Head: Normocephalic and atraumatic. Eyes: Pupils are equal, round, and reactive to light. EOM are normal.   Neck: Normal range of motion. Neck supple. No JVD present. No tracheal deviation present. No thyromegaly present. Cardiovascular: Normal rate, regular rhythm, normal heart sounds and intact distal pulses. Pulmonary/Chest: Effort normal and breath sounds normal. No stridor. No respiratory distress. He has no wheezes. He has no rales. He exhibits no tenderness. Abdominal: Soft. Bowel sounds are normal. He exhibits no distension and no mass. There is no tenderness. There is no rebound and no guarding. No hernia. Musculoskeletal: Normal range of motion. Lymphadenopathy:    Patient has no cervical adenopathy. Neurological: Patient is alert and oriented to person, place, and time. Psychiatric: Patient has a normal mood and affect.  Patient behavior is normal.       LABORATORY DATA: Reviewed  Lab Results   Component Value Date    WBC 5.0 07/08/2021    HGB 12.9 (L) 07/08/2021    HCT 41.3 07/08/2021    MCV 86.6 07/08/2021     07/08/2021     07/08/2021    K 3.8 07/08/2021     07/08/2021    CO2 27 07/08/2021    BUN 13 07/08/2021    CREATININE 1.11 07/08/2021    LABPROT 7.9 05/09/2012    LABALBU 4.4 09/11/2020    BILITOT 0.90 09/11/2020    ALKPHOS 96 09/11/2020    AST 17 09/11/2020    ALT 14 09/11/2020         Lab Results   Component Value Date    RBC 4.77 07/08/2021    HGB 12.9 (L) 07/08/2021    MCV 86.6 07/08/2021    MCH 27.0 07/08/2021    MCHC 31.2 07/08/2021    RDW 13.3 07/08/2021    MPV 9.7 07/08/2021    BASOPCT 1 07/20/2018    LYMPHSABS 1.60 07/20/2018    MONOSABS 0.40 07/20/2018    NEUTROABS 3.00 07/20/2018    EOSABS 0.00 07/20/2018    BASOSABS 0.00 07/20/2018         DIAGNOSTIC TESTING:     No results found. IMPRESSION: Mr.Ernest DORIE Hutton is a 56 y. o. 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  Patient reports that he had scrambled eggs and toast and since then he has been having difficulty swallowing localized to the oropharyngeal area.  Denies any globus sensation.  Denies any regurgitation or chest symptoms.  No other GI symptoms.  Patient reports that he is able to tolerate liquids with more thicker consistency currently which is improvement from previous.  Has yet to try solid food due to fear of dysphagia.  Denies any neurological symptoms that might suggest a possible TIA or CVA.        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-eradicated with negative breath test  Continues to have difficulty swallowing with solid food only-no structural causes identified during upper endoscopy and upper GI/esophagram  Esophageal biopsies were negative  Reports swallowing-\" fatigue \"  Unable to initiate swallowing during fatigue episodes   Was referred to neurology-myasthenia gravis antibodies appear to be favoring diagnosis       Had emperic dilation of non-obstructive Schatzki at [facility] gastroenterology    Assessment  Oropharyngeal dysphagia    PLAN:    1) oropharyngeal dysphagia -improvement seen with OP dysphagia with Mysthenia Gravis medications. Continue PPI as currently ordered. 2) Ongoing follow up with neurology services.   No strong indication for repeat upper endoscopy. 3) RTC in 6 months. Thank you for allowing me to participate in the care of Mr. Cristofer Bourgeois. For any further questions please do not hesitate to contact me. I have reviewed and agree with the ROS entered by the MA/LPN from today's encounter documented in a separate note. Amparo Gupta MD, MPH   [facility]  Office #: [phone]    this note is created with the assistance of a speech recognition program.  While intending to generate a document that actually reflects the content of the visit, the document can still have some errors including those of syntax and sound a like substitutions which may escape proof reading. It such instances, actual meaning can be extrapolated by contextual diversion.

## 2022-02-18 NOTE — TELEPHONE ENCOUNTER
I called pt. and he said they also want Speech therapy for the swallowing problems. Order placed for that too. He chose [facility].

## 2022-03-04 ENCOUNTER — OFFICE VISIT (OUTPATIENT)
Dept: PULMONOLOGY | Age: 62
End: 2022-03-04
Payer: COMMERCIAL

## 2022-03-04 VITALS
SYSTOLIC BLOOD PRESSURE: 122 MMHG | DIASTOLIC BLOOD PRESSURE: 73 MMHG | OXYGEN SATURATION: 98 % | BODY MASS INDEX: 36.44 KG/M2 | TEMPERATURE: 96.9 F | HEIGHT: 72 IN | RESPIRATION RATE: 18 BRPM | WEIGHT: 269 LBS | HEART RATE: 68 BPM

## 2022-03-04 DIAGNOSIS — G47.33 OSA ON CPAP: Primary | ICD-10-CM

## 2022-03-04 DIAGNOSIS — Z99.89 OSA ON CPAP: Primary | ICD-10-CM

## 2022-03-04 DIAGNOSIS — I10 ESSENTIAL HYPERTENSION: ICD-10-CM

## 2022-03-04 DIAGNOSIS — E66.9 OBESITY (BMI 35.0-39.9 WITHOUT COMORBIDITY): ICD-10-CM

## 2022-03-04 PROCEDURE — 1036F TOBACCO NON-USER: CPT | Performed by: INTERNAL MEDICINE

## 2022-03-04 PROCEDURE — G8427 DOCREV CUR MEDS BY ELIG CLIN: HCPCS | Performed by: INTERNAL MEDICINE

## 2022-03-04 PROCEDURE — G8417 CALC BMI ABV UP PARAM F/U: HCPCS | Performed by: INTERNAL MEDICINE

## 2022-03-04 PROCEDURE — 99214 OFFICE O/P EST MOD 30 MIN: CPT | Performed by: INTERNAL MEDICINE

## 2022-03-04 PROCEDURE — 3017F COLORECTAL CA SCREEN DOC REV: CPT | Performed by: INTERNAL MEDICINE

## 2022-03-04 PROCEDURE — G8482 FLU IMMUNIZE ORDER/ADMIN: HCPCS | Performed by: INTERNAL MEDICINE

## 2022-03-04 ASSESSMENT — SLEEP AND FATIGUE QUESTIONNAIRES
ESS TOTAL SCORE: 11
HOW LIKELY ARE YOU TO NOD OFF OR FALL ASLEEP WHILE SITTING AND TALKING TO SOMEONE: 0
HOW LIKELY ARE YOU TO NOD OFF OR FALL ASLEEP WHILE WATCHING TV: 2
HOW LIKELY ARE YOU TO NOD OFF OR FALL ASLEEP WHILE SITTING INACTIVE IN A PUBLIC PLACE: 2
HOW LIKELY ARE YOU TO NOD OFF OR FALL ASLEEP WHILE SITTING QUIETLY AFTER LUNCH WITHOUT ALCOHOL: 2
HOW LIKELY ARE YOU TO NOD OFF OR FALL ASLEEP WHEN YOU ARE A PASSENGER IN A CAR FOR AN HOUR WITHOUT A BREAK: 0
HOW LIKELY ARE YOU TO NOD OFF OR FALL ASLEEP WHILE SITTING AND READING: 2
HOW LIKELY ARE YOU TO NOD OFF OR FALL ASLEEP IN A CAR, WHILE STOPPED FOR A FEW MINUTES IN TRAFFIC: 0
HOW LIKELY ARE YOU TO NOD OFF OR FALL ASLEEP WHILE LYING DOWN TO REST IN THE AFTERNOON WHEN CIRCUMSTANCES PERMIT: 3

## 2022-03-04 NOTE — PROGRESS NOTES
Shorty Weeks  3/4/2022    Chief Complaint   Patient presents with    Sleep Apnea   Mr. Maribel Ignacio i is known to have struct of sleep apnea syndrome which is being treated with CPAP he uses CPAP regularly 6 to 8 hours every night. He used to have a leakage from the mouth but with the use of the chinstrap and the leakage of significantly improved and he does not complain of any dryness of the mouth sleep is refreshing no morning headaches no nasal stuffiness reflux symptoms have improved he does not complain of excessive daytime sleepiness. However an Gilman Sleepiness Scale revealed a score of 11 indicating daytime sleepiness. However there can be discrepancy between subjective and objective measurement of the daytime sleepiness. He has not noted any pedal edema no thromboembolic process. His blood pressure is under good control no angina no PND. He has not been able to lose much weight. Sleep Medicine 3/4/2022 2/22/2021 2/3/2020 8/5/2019 2/4/2019 8/2/2018 5/2/2018   Sitting and reading 2 0 0 0 1 1 0   Watching TV 2 1 2 0 1 0 0   Sitting, inactive in a public place (e.g. a theatre or a meeting) 2 0 0 0 1 0 0   As a passenger in a car for an hour without a break 0 0 0 0 1 1 0   Lying down to rest in the afternoon when circumstances permit 3 2 0 2 2 1 0   Sitting and talking to someone 0 0 0 0 0 0 0   Sitting quietly after a lunch without alcohol 2 1 1 1 1 1 0   In a car, while stopped for a few minutes in traffic 0 0 0 0 0 0 0   Total score 11 4 3 3 7 4 0   Neck circumference (Inches) - - - - - - 42         Review of Systems review is assistant was conducted for all other system including upper and lower extremities. No additional information was obtained.   General ROS: negative for - chills, fatigue, fever or weight loss  ENT ROS: negative for - headaches, oral lesions or sore throat  Cardiovascular ROS: no chest pain , orthopnea or pnd   Gastrointestinal ROS: no abdominal pain, change in bowel habits, or black or bloody stools  Skin - no rash   Neuro - no blurry vision , no loc . No focal weakness   msk - no jt tenderness or swelling    Vascular - no claudication , rest completed and negative   Lymphatic - complete and negative   Hematology - oncology - complete and negative   Allergy immunology - complete and negative    no burning or hematuria       LUNG CANCER SCREENING     1. CRITERIA MET    []     CT ORDERED  []      2. CRITERIA NOT MET   [x]      3. REFUSED                    []      Nonsmoker   REASON CRITERIA NOT MET     1. SMOKING LESS THAN 30 PY  []      2. AGE LESS THAN 55 or GREATER 77 YEARS  []      3. QUIT SMOKING 15 YEARS OR GREATER   []      4. RECENT CT WITH IN 11 MONTHS    []      5. LIFE EXPECTANCY < 5 YEARS   []      6. SIGNS  AND SYMPTOMS OF LUNG CANCER   []           Immunization   Immunization History   Administered Date(s) Administered    COVID-19, [employer], Primary or Immunocompromised, PF, 100mcg/0.5mL 02/27/2021, 03/20/2021    COVID-19, Pfizer Purple top, DILUTE for use, 12+ yrs, 30mcg/0.3mL dose 10/23/2021    Influenza A ([id number]) Vaccine PF IM 11/18/2009    Influenza Virus Vaccine 10/14/2014, 12/18/2015, 12/21/2016, 12/21/2017, 11/19/2018    Influenza, MDCK Quadv, IM, PF (Flucelvax 2 yrs and older) 09/07/2021    Influenza, Timo Lard, IM, (6 mo and older Fluzone, Flulaval, Fluarix and 3 yrs and older Afluria) 12/21/2016, 12/21/2017, 11/19/2018, 10/04/2019    Influenza, Quadv, adjuvanted, 65 yrs +, IM, PF (Fluad) 11/30/2020    Pneumococcal Conjugate 13-valent ([id number]) 11/19/2018    Pneumococcal Polysaccharide ([id number]) 12/19/2019    Tdap (Boostrix, Adacel) 06/26/2019        Pneumococcal Vaccine     [x] Up to date    [] Indicated   [] Refused  [] Contraindicated       Influenza Vaccine   [x] Up to date    [] Indicated   [] Refused  [] Contraindicated     He already had a COVID-19 vaccine.   PAST MEDICAL HISTORY:         Diagnosis Date    Arthritis     neck    BPH (benign prostatic hyperplasia)     Class 2 severe obesity due to excess calories with serious comorbidity in adult [facility])     Obesity (BMI 30.0-34. 9) [E66.9]    Dysphagia     Erectile dysfunction     Full dentures     GERD (gastroesophageal reflux disease)     Hiatal hernia     Hyperlipidemia     Hypertension     YUMIKO on CPAP     PONV (postoperative nausea and vomiting)     nauseated after last surgery    Unspecified sleep apnea     cpap nightly       Family History:       Problem Relation Age of Onset    Heart Disease Mother        SURGICAL HISTORY:   Past Surgical History:   Procedure Laterality Date    COLONOSCOPY      ENDOSCOPY, COLON, DIAGNOSTIC      ESOPHAGEAL MOTILITY STUDY N/A 3/17/2021    ESOPHAGEAL MOTILITY STUDY performed by Andra Euceda MD at [facility]    PENIS SURGERY N/A 7/16/2021    CYSTO  FULGURATION  EXCISION PENILE WARTS performed by Wilfredo Toro MD at [address] [facility] N/A 7/31/2018    SUPRA PUBIC LESIONS BIOPSY EXCISION performed by Wilfredo Toro MD at [address] Bilateral 07/31/2018    SUPRA PUBIC LESIONS BIOPSY EXCISION (N/A )    THROAT SURGERY  2008    TONSILLECTOMY      TUMOR REMOVAL Right 09/23/2016    [facility]  right thigh   (benign)    UPPER GASTROINTESTINAL ENDOSCOPY N/A 12/21/2020    EGD BIOPSY performed by Andra Euceda MD at [facility]              Not in a hospital admission. Allergies   Allergen Reactions    Pcn [Penicillins]      Patient states he was told he had allergy as a child. Has taken penicillin related medications in past with no problems. Social History     Tobacco Use   Smoking Status Never Smoker   Smokeless Tobacco Never Used     Prior to Admission medications    Medication Sig Start Date End Date Taking? Authorizing Provider   omeprazole (PRILOSEC) 20 MG delayed release capsule TAKE ONE CAPSULE BY MOUTH DAILY.  TAKE 45 MINUTES PRIOR TO ALL OTHER MEDICATIONS. 2/9/22  Yes Andra Euceda MD valsartan-hydroCHLOROthiazide (DIOVAN-HCT) 160-12.5 MG per tablet TAKE ONE TABLET BY MOUTH EVERY DAY 12/20/21  Yes Leela Santiago MD   amLODIPine (NORVASC) 10 MG tablet TAKE ONE TABLET BY MOUTH EVERY DAY 12/20/21  Yes Leela Santiago MD   pravastatin (PRAVACHOL) 40 MG tablet TAKE ONE TABLET BY MOUTH EVERY DAY 12/20/21  Yes Leela Santiago MD   Pyridostigmine Bromide 60 MG/5ML SOLN TAKE 5 ML BY MOUTH THREE TIMES DAILY 11/24/21  Yes Germáneal Patient, MD   loratadine (CLARITIN) 10 MG tablet TAKE ONE TABLET BY MOUTH EVERY DAY 10/21/21  Yes Leela Santiago MD   potassium chloride (KLOR-CON M) 20 MEQ extended release tablet TAKE ONE TABLET BY MOUTH EVERY DAY 3/15/21  Yes Leela Santiago MD   spironolactone (ALDACTONE) 50 MG tablet TAKE ONE TABLET BY MOUTH EVERY DAY 11/2/20  Yes Leela Santiago MD   fluticasone (FLONASE) 50 MCG/ACT nasal spray 1 spray by Each Nostril route daily  Patient taking differently: 1 spray by Each Nostril route daily Indications: Patient using prn  7/17/20  Yes Leela Santiago MD   azelastine (ASTELIN) 0.1 % nasal spray 1 spray by Nasal route 2 times daily Use in each nostril as directed  Patient taking differently: 1 spray by Nasal route 2 times daily Indications: Patient using PRN Use in each nostril as directed 7/17/20  Yes Leela Santiago MD   tamsulosin (FLOMAX) 0.4 MG capsule Take 0.4 mg by mouth daily   Yes Glenys Marsh MD         Physical Exam  General Appearance:    Alert, cooperative, no distress, appears stated age, Obesity [place]. Distress, very cooperative not sleeping not using any accessory muscles   Head:    Normocephalic, without obvious abnormality, atraumatic   Allergies no polyps. No sinus tenderness noted. No aspirin intolerance    Throat examination is unremarkable.     Ear examination is normal.    Eyes do not reveal any jaundice or Elías's syndrome                :    Neck:   Supple, symmetrical, trachea midline, no adenopathy;     thyroid:  no enlargement/tenderness/nodules; no to lose weight. I invited advised to participate in exercise program and caloric restriction. However we all know that the weight loss is difficult to achieve but he seems to be motivated to do so.     I plan to see him in follow-up in 6 months

## 2022-03-08 ENCOUNTER — HOSPITAL ENCOUNTER (OUTPATIENT)
Dept: GENERAL RADIOLOGY | Age: 62
Discharge: HOME | End: 2022-03-10
Payer: COMMERCIAL

## 2022-03-08 DIAGNOSIS — R13.19 ESOPHAGEAL DYSPHAGIA: ICD-10-CM

## 2022-03-08 PROCEDURE — 74230 X-RAY XM SWLNG FUNCJ C+: CPT

## 2022-03-08 PROCEDURE — 92611 MOTION FLUOROSCOPY/SWALLOW: CPT

## 2022-03-08 NOTE — PROCEDURES
INSTRUMENTAL SWALLOW REPORT  MODIFIED BARIUM SWALLOW    NAME: Maribeth Khalil   : 1960  MRN: [id number]       Date of Eval: 3/8/2022              Referring Diagnosis(es):      Past Medical History:  has a past medical history of Arthritis, BPH (benign prostatic hyperplasia), Class 2 severe obesity due to excess calories with serious comorbidity in [facility]), Dysphagia, Erectile dysfunction, Full dentures, GERD (gastroesophageal reflux disease), Hiatal hernia, Hyperlipidemia, Hypertension, YUMIKO on CPAP, PONV (postoperative nausea and vomiting), and Unspecified sleep apnea. Past Surgical History:  has a past surgical history that includes Throat surgery (); tumor removal (Right, 2016); Endoscopy, colon, diagnostic; Colonoscopy; skin biopsy (Bilateral, 2018); pr destr penis lesn,simpl,surg excis (N/A, 2018); Upper gastrointestinal endoscopy (N/A, 2020); esophageal motility study (N/A, 3/17/2021); Tonsillectomy; and Penis surgery (N/A, 2021). Current Diet Solid Consistency: Regular  Current Diet Liquid Consistency: Thin       Type of Study: Initial MBS       Patient Complaints/Reason for Referral:  Maribeth Khalil was referred for a MBS to assess the efficiency of his/her swallow function, assess for aspiration, and to make recommendations regarding safe dietary consistencies, effective compensatory strategies, and safe eating environment. Onset of problem:  Pt reports difficulty swallowing approximately 1 year. He states since starting medication prescribed by neurologist and EGD, his swallowing has improved. Pt reports recent dx of Myasthenia Gravis, states he is undergoing continued testing. Behavior/Cognition/Vision/Hearing:  Behavior/Cognition: Cooperative; Alert  Vision: Within Functional Limits  Hearing: Within functional limits    Impressions:  Patient presents with safe swallow for Regular diet with thin liquids as evidenced by no penetration or aspiration with consistencies tested. Note piece meal swallow,  Min-mod vallecula and pyriform residue with all consistencies, decreased/cleared with double swallow. Recommend small sips and bites, double swallow, only feed when alert and awake and upright at 90 degrees for all PO intake. Recommend close monitoring for overt/clinical s/s of aspiration and D/C PO intake and repeat Modified Barium Swallow Study should they occur. Results and recommendations discussed with pt, no further ST recommended at this time as swallow is within functional limits. Patient Position: Lateral and Patient Degrees: 90      Consistencies Administered: All    Compensatory Swallowing Strategies Attempted: Swallow 2 times per bite/sip  Postural Changes and/or Swallow Maneuvers Trialed: Upright 90 degrees    Dysphagia Outcome Severity Scale: Level 6: Within functional limits/Modified independence  Penetration-Aspiration Scale (PAS): 1 - Material does not enter the airway    Recommended Diet:  Solid consistency: Regular  Liquid consistency: Thin       Medication administration: PO    Safe Swallow Protocol:  Supervision: Independent  Compensatory Swallowing Strategies: Alternate solids and liquids;Upright as possible for all oral intake;Eat/Feed slowly; Remain upright for 30-45 minutes after meals;Small bites/sips;Swallow 2 times per bite/sip        Recommendations/Treatment  Requires SLP Intervention: No        D/C Recommendations:  To be determined  Postural Changes and/or Swallow Maneuvers: Upright 90 degrees             Education: Images and recommendations were reviewed with pt following this exam.   Patient Education: yes  Patient Education Response: Verbalizes understanding    Prognosis  Prognosis for safe diet advancement: good      Oral Preparation / Oral Phase  Oral Phase: WFL    Oral Phase: Note piece meal swallow with all consistencies      Pharyngeal Phase    Pharyngeal: No penetration, no aspiration with all consistencies (puree, soft solid, regular solid, nectar, thin liquid).       Esophageal Phase  Esophageal Screen: [facility]  Upper Esophageal Screen: Suspect mild decreased UES opening, functional      Pain   Patient Currently in Pain: No         Therapy Time:   Individual Concurrent Group Co-treatment   Time In 0855         Time Out 0905         Minutes [address] [place] [address], 3/8/2022, 3:43 PM

## 2022-03-21 RX ORDER — VALSARTAN AND HYDROCHLOROTHIAZIDE 160; 12.5 MG/1; MG/1
TABLET, FILM COATED ORAL
Qty: 90 TABLET | Refills: 1 | OUTPATIENT
Start: 2022-03-21

## 2022-03-21 NOTE — TELEPHONE ENCOUNTER
Alberto Arroyo is calling to request a refill on the following medication(s):    Medication Request:  Requested Prescriptions     Pending Prescriptions Disp Refills    valsartan-hydroCHLOROthiazide (DIOVAN-HCT) 160-12.5 MG per tablet [Pharmacy Med Name: valsartan 160 mg-hydrochlorothiazide 12.5 mg tablet] 90 tablet 1     Sig: TAKE ONE TABLET BY MOUTH EVERY DAY     Last filled 12/20/21 90 day 1 refill  Not due    Last Visit Date (If Applicable):  77/4/6600    Next Visit Date:    4/6/2022

## 2022-03-28 DIAGNOSIS — G70.00 MYASTHENIA GRAVIS (HCC): ICD-10-CM

## 2022-03-29 NOTE — TELEPHONE ENCOUNTER
Pharmacy requesting a  refill of: Pyridostigmine 60mg        Medication active on med list yes     Date of last prescription: 11/24/2021  with 3  refills verified on 03/29/2022  verified by MALICK CABALLERO     Date of last appointment: 12/28/2022     Next Visit Date:  05/04/2022

## 2022-03-31 RX ORDER — PYRIDOSTIGMINE BROMIDE 60 MG/5ML
SOLUTION ORAL
Qty: 450 ML | Refills: 5 | Status: SHIPPED | OUTPATIENT
Start: 2022-03-31 | End: 2022-05-04 | Stop reason: SDUPTHER

## 2022-04-06 ENCOUNTER — OFFICE VISIT (OUTPATIENT)
Dept: INTERNAL MEDICINE CLINIC | Age: 62
End: 2022-04-06
Payer: COMMERCIAL

## 2022-04-06 ENCOUNTER — HOSPITAL ENCOUNTER (OUTPATIENT)
Age: 62
Setting detail: SPECIMEN
Discharge: HOME | End: 2022-04-06

## 2022-04-06 VITALS
RESPIRATION RATE: 16 BRPM | HEIGHT: 72 IN | DIASTOLIC BLOOD PRESSURE: 68 MMHG | WEIGHT: 273 LBS | SYSTOLIC BLOOD PRESSURE: 124 MMHG | BODY MASS INDEX: 36.98 KG/M2 | OXYGEN SATURATION: 98 % | HEART RATE: 70 BPM | TEMPERATURE: 98.2 F

## 2022-04-06 DIAGNOSIS — G47.33 OSA ON CPAP: ICD-10-CM

## 2022-04-06 DIAGNOSIS — I10 ESSENTIAL HYPERTENSION: ICD-10-CM

## 2022-04-06 DIAGNOSIS — M67.919 TENDINOPATHY OF ROTATOR CUFF, UNSPECIFIED LATERALITY: ICD-10-CM

## 2022-04-06 DIAGNOSIS — F41.9 ANXIETY HYPERVENTILATION: ICD-10-CM

## 2022-04-06 DIAGNOSIS — Z98.890 HISTORY OF ESOPHAGEAL DILATATION: ICD-10-CM

## 2022-04-06 DIAGNOSIS — N40.0 BENIGN PROSTATIC HYPERPLASIA WITHOUT LOWER URINARY TRACT SYMPTOMS: ICD-10-CM

## 2022-04-06 DIAGNOSIS — F45.8 ANXIETY HYPERVENTILATION: ICD-10-CM

## 2022-04-06 DIAGNOSIS — G70.00 MYASTHENIA GRAVIS (HCC): ICD-10-CM

## 2022-04-06 DIAGNOSIS — N52.01 ERECTILE DYSFUNCTION DUE TO ARTERIAL INSUFFICIENCY: ICD-10-CM

## 2022-04-06 DIAGNOSIS — M50.90 DISORDER OF INTERVERTEBRAL DISC OF CERVICAL SPINE: ICD-10-CM

## 2022-04-06 DIAGNOSIS — E78.2 MIXED HYPERLIPIDEMIA: ICD-10-CM

## 2022-04-06 DIAGNOSIS — R13.12 OROPHARYNGEAL DYSPHAGIA: ICD-10-CM

## 2022-04-06 DIAGNOSIS — Z99.89 OSA ON CPAP: ICD-10-CM

## 2022-04-06 DIAGNOSIS — J30.89 SEASONAL ALLERGIC RHINITIS DUE TO OTHER ALLERGIC TRIGGER: ICD-10-CM

## 2022-04-06 DIAGNOSIS — K21.9 GASTROESOPHAGEAL REFLUX DISEASE WITHOUT ESOPHAGITIS: ICD-10-CM

## 2022-04-06 DIAGNOSIS — N18.30 STAGE 3 CHRONIC KIDNEY DISEASE, UNSPECIFIED WHETHER STAGE 3A OR 3B CKD (HCC): ICD-10-CM

## 2022-04-06 DIAGNOSIS — N18.30 STAGE 3 CHRONIC KIDNEY DISEASE, UNSPECIFIED WHETHER STAGE 3A OR 3B CKD (HCC): Primary | ICD-10-CM

## 2022-04-06 LAB
ALBUMIN SERPL-MCNC: 4.6 G/DL (ref 3.5–5.2)
ALBUMIN/GLOBULIN RATIO: 1.5 (ref 1–2.5)
ALP BLD-CCNC: 85 U/L (ref 40–129)
ALT SERPL-CCNC: 23 U/L (ref 5–41)
ANION GAP SERPL CALCULATED.3IONS-SCNC: 11 MMOL/L (ref 9–17)
AST SERPL-CCNC: 21 U/L
BILIRUB SERPL-MCNC: 0.94 MG/DL (ref 0.3–1.2)
BUN BLDV-MCNC: 12 MG/DL (ref 8–23)
CALCIUM SERPL-MCNC: 9.6 MG/DL (ref 8.6–10.4)
CHLORIDE BLD-SCNC: 104 MMOL/L (ref 98–107)
CHOLESTEROL/HDL RATIO: 2.6
CHOLESTEROL: 125 MG/DL
CO2: 28 MMOL/L (ref 20–31)
CREAT SERPL-MCNC: 1.03 MG/DL (ref 0.7–1.2)
ESTIMATED AVERAGE GLUCOSE: 114 MG/DL
GFR AFRICAN AMERICAN: >60 ML/MIN
GFR NON-AFRICAN AMERICAN: >60 ML/MIN
GFR SERPL CREATININE-BSD FRML MDRD: NORMAL ML/MIN/{1.73_M2}
GLUCOSE BLD-MCNC: 99 MG/DL (ref 70–99)
HBA1C MFR BLD: 5.6 % (ref 4–6)
HCT VFR BLD CALC: 41.8 % (ref 40.7–50.3)
HDLC SERPL-MCNC: 49 MG/DL
HEMOGLOBIN: 13.6 G/DL (ref 13–17)
LDL CHOLESTEROL: 63 MG/DL (ref 0–130)
MCH RBC QN AUTO: 28.2 PG (ref 25.2–33.5)
MCHC RBC AUTO-ENTMCNC: 32.5 G/DL (ref 28.4–34.8)
MCV RBC AUTO: 86.5 FL (ref 82.6–102.9)
NRBC AUTOMATED: 0 PER 100 WBC
PDW BLD-RTO: 13.7 % (ref 11.8–14.4)
PLATELET # BLD: 214 K/UL (ref 138–453)
PMV BLD AUTO: 9.5 FL (ref 8.1–13.5)
POTASSIUM SERPL-SCNC: 3.8 MMOL/L (ref 3.7–5.3)
PROSTATE SPECIFIC ANTIGEN: 2.7 UG/L
RBC # BLD: 4.83 M/UL (ref 4.21–5.77)
SODIUM BLD-SCNC: 143 MMOL/L (ref 135–144)
TOTAL PROTEIN: 7.6 G/DL (ref 6.4–8.3)
TRIGL SERPL-MCNC: 66 MG/DL
TSH SERPL DL<=0.05 MIU/L-ACNC: 3.66 UIU/ML (ref 0.3–5)
WBC # BLD: 5.1 K/UL (ref 3.5–11.3)

## 2022-04-06 PROCEDURE — 99214 OFFICE O/P EST MOD 30 MIN: CPT | Performed by: FAMILY MEDICINE

## 2022-04-06 PROCEDURE — G8417 CALC BMI ABV UP PARAM F/U: HCPCS | Performed by: FAMILY MEDICINE

## 2022-04-06 PROCEDURE — 1036F TOBACCO NON-USER: CPT | Performed by: FAMILY MEDICINE

## 2022-04-06 PROCEDURE — 3017F COLORECTAL CA SCREEN DOC REV: CPT | Performed by: FAMILY MEDICINE

## 2022-04-06 PROCEDURE — G8427 DOCREV CUR MEDS BY ELIG CLIN: HCPCS | Performed by: FAMILY MEDICINE

## 2022-04-06 ASSESSMENT — PATIENT HEALTH QUESTIONNAIRE - PHQ9
2. FEELING DOWN, DEPRESSED OR HOPELESS: 0
SUM OF ALL RESPONSES TO PHQ QUESTIONS 1-9: 0
1. LITTLE INTEREST OR PLEASURE IN DOING THINGS: 0
SUM OF ALL RESPONSES TO PHQ QUESTIONS 1-9: 0
SUM OF ALL RESPONSES TO PHQ9 QUESTIONS 1 & 2: 0
SUM OF ALL RESPONSES TO PHQ QUESTIONS 1-9: 0
SUM OF ALL RESPONSES TO PHQ QUESTIONS 1-9: 0

## 2022-04-06 NOTE — PROGRESS NOTES
Subjective:      Patient ID: Geno Alonzo is a 59 y.o. male. Hypertension  This is a chronic problem. The current episode started more than 1 month ago. The problem has been gradually improving since onset. The problem is controlled. Associated symptoms include anxiety. Risk factors for coronary artery disease include stress, obesity, male gender and dyslipidemia. Past treatments include angiotensin blockers and diuretics. The current treatment provides moderate improvement. There are no compliance problems. Identifiable causes of hypertension include sleep apnea. Review of Systems   Constitutional: Negative. HENT: Negative. Eyes: Negative. Respiratory: Negative. Cardiovascular: Negative. Gastrointestinal: Negative. Endocrine: Negative. Musculoskeletal: Positive for arthralgias. Skin: Negative. Allergic/Immunologic: Negative. Neurological: Negative. Hematological: Negative. Psychiatric/Behavioral: The patient is nervous/anxious. Past family and social history unremarkable. Diagnosis Orders   1. Stage 3 chronic kidney disease, unspecified whether stage 3a or 3b CKD (HCC)  CBC    Comprehensive Metabolic Panel    Hemoglobin A1C    Lipid Panel    Urinalysis with Microscopic    TSH    PSA Screening   2. Benign prostatic hyperplasia without lower urinary tract symptoms  CBC    Comprehensive Metabolic Panel    Hemoglobin A1C    Lipid Panel    Urinalysis with Microscopic    TSH    PSA Screening   3. Erectile dysfunction due to arterial insufficiency     4. Gastroesophageal reflux disease without esophagitis     5. Essential hypertension     6. Mixed hyperlipidemia  CBC    Comprehensive Metabolic Panel    Hemoglobin A1C    Lipid Panel    Urinalysis with Microscopic    TSH    PSA Screening   7. Tendinopathy of rotator cuff, unspecified laterality     8. YUMIKO on CPAP     9. Anxiety hyperventilation     10. Seasonal allergic rhinitis due to other allergic trigger     11. Oropharyngeal dysphagia     12. Disorder of intervertebral disc of cervical spine     13. Myasthenia gravis ([address]  CBC    Comprehensive Metabolic Panel    Hemoglobin A1C    Lipid Panel    Urinalysis with Microscopic    TSH    PSA Screening   14. History of esophageal dilatation           Objective:   Physical Exam  Vitals and nursing note reviewed. Constitutional:       Appearance: He is well-developed. HENT:      Head: Normocephalic and atraumatic. Right Ear: External ear normal.      Left Ear: External ear normal.      Nose: Nose normal.      Comments: Allergies, allergic rhinitis  Eyes:      Conjunctiva/sclera: Conjunctivae normal.      Pupils: Pupils are equal, round, and reactive to light. Cardiovascular:      Rate and Rhythm: Normal rate and regular rhythm. Heart sounds: Normal heart sounds. Comments: Hypertension  Hyperlipidemia  Pulmonary:      Effort: Pulmonary effort is normal.      Breath sounds: Normal breath sounds. Abdominal:      General: Bowel sounds are normal.      Palpations: Abdomen is soft. Comments: Oral and pharyngeal dysphagia status post dilatation in [facility]   Genitourinary:     Comments: CKD  Musculoskeletal:         General: Normal range of motion. Cervical back: Normal range of motion and neck supple. Skin:     General: Skin is warm and dry. Neurological:      Mental Status: He is alert and oriented to person, place, and time. Deep Tendon Reflexes: Reflexes are normal and symmetric. Comments: Myasthenia gravis   Psychiatric:      Comments: Anxiety         Assessment:       Diagnosis Orders   1. Stage 3 chronic kidney disease, unspecified whether stage 3a or 3b CKD (HCC)  CBC    Comprehensive Metabolic Panel    Hemoglobin A1C    Lipid Panel    Urinalysis with Microscopic    TSH    PSA Screening   2.  Benign prostatic hyperplasia without lower urinary tract symptoms  CBC    Comprehensive Metabolic Panel    Hemoglobin A1C    Lipid Panel Urinalysis with Microscopic    TSH    PSA Screening   3. Erectile dysfunction due to arterial insufficiency     4. Gastroesophageal reflux disease without esophagitis     5. Essential hypertension     6. Mixed hyperlipidemia  CBC    Comprehensive Metabolic Panel    Hemoglobin A1C    Lipid Panel    Urinalysis with Microscopic    TSH    PSA Screening   7. Tendinopathy of rotator cuff, unspecified laterality     8. YUMIKO on CPAP     9. Anxiety hyperventilation     10. Seasonal allergic rhinitis due to other allergic trigger     11. Oropharyngeal dysphagia     12. Disorder of intervertebral disc of cervical spine     13. Myasthenia gravis ([address]  CBC    Comprehensive Metabolic Panel    Hemoglobin A1C    Lipid Panel    Urinalysis with Microscopic    TSH    PSA Screening   14. History of esophageal dilatation             Plan:      57-year-old -American male with underlying history significant for myasthenia gravis on [place] is presented for follow-up. He is afebrile hemodynamically stable. Patient states that he was recently seen in [facility] clinic in coordination with neurology. Patient states that he underwent EGD and dilatation with improved oropharyngeal dysphagia. Obesity with weight gain. Dietary noncompliance. Compliance is advised  Hyperlipidemia on statin that he is tolerating well  Hypertension well-controlled to Diovan HCT and amlodipine, Aldactone that he is tolerating well. Consume less than 2 g of salt a day   Allergist allergic rhinitis. Continue Flonase, nasal saline and antihistamine   History of GERD stable on proton pump inhibitor as needed  CKD with creatinine of 1.11. Avoid nephrotoxic drugs, anti-inflammatory radiocontrast.  Maintain oral hydration. He may take over-the-counter Tylenol as needed for musculoskeletal pain  Anxiety. Gradual improvement.   He denies tobacco, excessive alcohol or illicit drug use  He is up-to-date influenza, pneumococcal vaccine  Med list reviewed advised to continue  Further recommendations to follow updated labs  He is encouraged to call for any concern  This note is created with a voice recognition program and while intend to generate a document that accurately reflects the content of the visit, no guarantee can be provided that every mistake has been identified and corrected by editing.       Delroy Candelaria MD

## 2022-05-04 ENCOUNTER — OFFICE VISIT (OUTPATIENT)
Dept: NEUROLOGY | Age: 62
End: 2022-05-04
Payer: COMMERCIAL

## 2022-05-04 ENCOUNTER — TELEPHONE (OUTPATIENT)
Dept: NEUROLOGY | Age: 62
End: 2022-05-04

## 2022-05-04 VITALS
SYSTOLIC BLOOD PRESSURE: 117 MMHG | DIASTOLIC BLOOD PRESSURE: 64 MMHG | HEART RATE: 64 BPM | HEIGHT: 72 IN | WEIGHT: 267.6 LBS | BODY MASS INDEX: 36.24 KG/M2

## 2022-05-04 DIAGNOSIS — G70.00 MYASTHENIA GRAVIS (HCC): ICD-10-CM

## 2022-05-04 PROCEDURE — 1036F TOBACCO NON-USER: CPT | Performed by: PSYCHIATRY & NEUROLOGY

## 2022-05-04 PROCEDURE — 3017F COLORECTAL CA SCREEN DOC REV: CPT | Performed by: PSYCHIATRY & NEUROLOGY

## 2022-05-04 PROCEDURE — G8417 CALC BMI ABV UP PARAM F/U: HCPCS | Performed by: PSYCHIATRY & NEUROLOGY

## 2022-05-04 PROCEDURE — 99214 OFFICE O/P EST MOD 30 MIN: CPT | Performed by: PSYCHIATRY & NEUROLOGY

## 2022-05-04 PROCEDURE — G8427 DOCREV CUR MEDS BY ELIG CLIN: HCPCS | Performed by: PSYCHIATRY & NEUROLOGY

## 2022-05-04 RX ORDER — PYRIDOSTIGMINE BROMIDE 60 MG/5ML
SOLUTION ORAL
Qty: 450 ML | Refills: 5 | Status: SHIPPED | OUTPATIENT
Start: 2022-05-04 | End: 2022-10-24

## 2022-05-04 NOTE — PROGRESS NOTES
[facility], [address], [address] [place] [address]  Ph: [phone] or [phone]  FAX: [phone]    Chief Complaint: Oropharyngeal Dysphagia, myasthenia gravis     Dear Lakeisha Lira MD     I had the pleasure of seeing your patient today in neurology consultation for his symptoms. As you would recall Luis Fernando Montero is a 59 y.o. male. Patient has reports with a history of difficulty swallowing since November 2020. Patient has reported to have been seen by multiple specialist in the past and was referred to neurology. He has previously completed a swallow test which was unable to identify cause of difficulty. He admits his weight has remained stable once he found foods he could eat with minimal difficulty. He reports he is eating softer, smaller foods with more ease. Furthermore, he still experiences numbness and tingling in his fingers on the right hand thought to be caused by the injury. Patient reports his diagnosis was not consistent with myasthenia gravis at this time and saw the [facility] for a second opinion. Denies urine and bowel incontinence. Today, the patient reports that his symptoms have improved since last visit; however, he reports that his swallowing has not returned to baseline yet. Patient is able to eat more solid foods, but still has difficulty with some tougher foods. Denies any weakness or tiredness when walking or lifting objects. Admits to diplopia after prolonged reading. He also experience droopiness affecting his right eye. He admits to medication compliance with Pyridostigmine Bromide 60mg/5mL soln TID and admits to relief. Past Medical History:   Diagnosis Date    Arthritis     neck    BPH (benign prostatic hyperplasia)     Class 2 severe obesity due to excess calories with serious comorbidity in adult [facility])     Obesity (BMI 30.0-34. 9) [E66.9]    Dysphagia     Erectile dysfunction     Full dentures     GERD (gastroesophageal reflux disease)     Hiatal hernia     Hyperlipidemia     Hypertension     YUMIKO on CPAP     PONV (postoperative nausea and vomiting)     nauseated after last surgery    Unspecified sleep apnea     cpap nightly     Past Surgical History:   Procedure Laterality Date    COLONOSCOPY      ENDOSCOPY, COLON, DIAGNOSTIC      ESOPHAGEAL MOTILITY STUDY N/A 3/17/2021    ESOPHAGEAL MOTILITY STUDY performed by Igor Redmond MD at [address] N/A 7/16/2021    CYSTO  FULGURATION  EXCISION PENILE WARTS performed by Ama Butler MD at [address] N/A 7/31/2018    SUPRA PUBIC LESIONS BIOPSY EXCISION performed by Ama Butler MD at [address] Bilateral 07/31/2018    SUPRA PUBIC LESIONS BIOPSY EXCISION (N/A )    THROAT SURGERY  2008    TONSILLECTOMY      TUMOR REMOVAL Right 09/23/2016    [facility]  right thigh   (benign)    UPPER GASTROINTESTINAL ENDOSCOPY N/A 12/21/2020    EGD BIOPSY performed by Igor Redmond MD at [facility] [[place]]      Patient states he was told he had allergy as a child. Has taken penicillin related medications in past with no problems.      Family History   Problem Relation Age of Onset    Heart Disease Mother       Social History     Socioeconomic History    Marital status:      Spouse name: Not on file    Number of children: Not on file    Years of education: Not on file    Highest education level: Not on file   Occupational History    Not on file   Tobacco Use    Smoking status: Never Smoker    Smokeless tobacco: Never Used   Vaping Use    Vaping Use: Never used   Substance and Sexual Activity    Alcohol use: Not Currently    Drug use: No    Sexual activity: Not on file   Other Topics Concern    Not on file   Social History Narrative    Not on file     Social Determinants of Health     Financial Resource Strain: Low Risk     Difficulty of Paying Living Expenses: Not hard at all   Food Insecurity: No Food Insecurity    Worried About Running Out of Food in the Last Year: Never true    Ran Out of Food in the Last Year: Never true   Transportation Needs:     Lack of Transportation (Medical): Not on file    Lack of Transportation (Non-Medical): Not on file   Physical Activity:     Days of Exercise per Week: Not on file    Minutes of Exercise per Session: Not on file   Stress:     Feeling of Stress : Not on file   Social Connections:     Frequency of Communication with Friends and Family: Not on file    Frequency of Social Gatherings with Friends and Family: Not on file    Attends Anabaptist Services: Not on file    Active Member of [address] [place] [address] or Organizations: Not on file    Attends Club or Organization Meetings: Not on file    Marital Status: Not on file   Intimate Partner Violence:     Fear of Current or Ex-Partner: Not on file    Emotionally Abused: Not on file    Physically Abused: Not on file    Sexually Abused: Not on file   Housing Stability:     Unable to Pay for Housing in the Last Year: Not on file    Number of [place] in the Last Year: Not on file    Unstable Housing in the Last Year: Not on file      There were no vitals taken for this visit.      HEENT [] Hearing Loss  [] Visual Disturbance  [] Tinnitus  [] Eye pain   Respiratory [] Shortness of Breath  [] Cough  [] Snoring   Cardiovascular [] Chest Pain  [] Palpitations  [] Lightheaded   GI [] Constipation  [] Diarrhea  [x] Swallowing change  [] Nausea/vomiting    [] Urinary Frequency  [] Urinary Urgency   Musculoskeletal [] Neck pain  [] Back pain  [] Muscle pain  [] Restless legs   Dermatologic [] Skin changes   Neurologic [] Memory loss/confusion  [] Seizures  [] Trouble walking or imbalance  [] Dizziness  [] Sleep disturbance  [] Weakness  [x] Numbness  [] Tremors  [] Speech Difficulty  [] Headaches  [] Light Sensitivity  [] Sound Sensitivity   Endocrinology []Excessive thirst  []Excessive hunger   Psychiatric [] Anxiety/Depression  [] Hallucination   Allergy/immunology []Hives/environmental allergies   Hematologic/lymph [] Abnormal bleeding  [] Abnormal bruising     General appearence: Normal. Well groomed. In no acute distress    Head: Normocephalic, atraumatic  Eyes: Extraocular movements intact, eye lids normal  Lungs: Respirations unlabored, chest wall no deformity  ENT: Normal external ear canals, no sinus tenderness  Heart: Regular rate rhythm  Abdomen: No masses, tenderness  Extremities: No cyanosis or edema, 2+ pulses  Musculoskeletal: Normal range of motion in all joints  Skin: Intact, normal skin color    Neurological examination:    Mental status   Alert and oriented; intact memory with no confusion, speech or language problems; no hallucinations or delusions     Cranial nerves   II - visual fields intact to confrontation                                                III, IV, VI - extra-ocular muscles full: no pupillary defect; no ANALI, no nystagmus, no ptosis   V - normal facial sensation                                                               VII - normal facial symmetry                                                             VIII - intact hearing                                                                             IX, X - symmetrical palate                                                                  XI - symmetrical shoulder shrug                                                       XII - midline tongue without atrophy or fasciculation     Motor function  Normal muscle bulk and tone; normal power 5/5, including fine motor movements     Sensory function Intact to touch, pin, vibration, proprioception     Cerebellar Intact fine motor movement. No involuntary movements or tremors     Reflex function Intact 2+ DTR and symmetric.  Negative Babinski     Gait                  Normal station and gait       Lab Results   Component Value Date LDLCHOLESTEROL 63 04/06/2022     No components found for: CHLPL  Lab Results   Component Value Date    TRIG 66 04/06/2022    TRIG 47 06/01/2021    TRIG 69 06/24/2020     Lab Results   Component Value Date    HDL 49 04/06/2022    HDL 52 06/01/2021    HDL 46 06/24/2020     No results found for: LDLCALC  No results found for: LABVLDL  Lab Results   Component Value Date    LABA1C 5.6 04/06/2022     Lab Results   Component Value Date     04/06/2022     Lab Results   Component Value Date    CSPLFJFA62 731 02/13/2014        Neurological work up:  CK on 8/4/2021 was within normal limits at 139 U/L  Myasthenia Gravis Panel performed on 8/4/2021 was within normal limits at 0.1 nmol/L  Myoglobin performed on 8/4/2021 was within normal limits at 34 ng/mL    MRI Brain W WO Contrast on 02/03/2021: No acute intracranial abnormality. Mild chronic microvascular disease within the periventricular white matter. MRI Cervical Spine WO Contrast on 02/03/2021: Moderate-sized broad-based right paracentral disc protrusion at C5-6 in mild compression of the cervical cord on the right-side.  In addition, moderate central canal stenosis and moderate bilateral foraminal stenosis are identified at this level. Broad disc osteophyte complex at C6-C7 resulting in moderate central canal stenosis.  Severe left foraminal stenosis and moderate right foraminal  stenosis are additionally appreciated at this level. Small central disc protrusion at C4-C5 with associated mild right foraminal stenosis. Small central disc protrusion at C3-C4 with associated moderate left foraminal stenosis. Moderate left foraminal stenosis at C2-C3. All of patient's labs were personally reviewed. All the imaging studies were personally reviewed and discussed with the patient. Assessment Recommendations:  Patient with oropharyngeal dysphagia.   Possible myasthenia gravis with positive striated muscle antibody titer    Following visitation with a neuromuscular specialist [facility], the patient tested negative for acetylcholine receptor antibodies, including MUSK antibodies. Patient has also been seen by the [address] [place] [address]. He reports to moderate improvement of dysphagia symptoms but continues to have difficulty swallowing tougher foods. I recommend he continue Pyridostigmine Bromide 60mg/5mL soln TID, and I discussed the possibility of initiating an immunosuppression steroid for continued improvement. Before doing so, I plan to consult medication adjustments with his GI physician. All medication side effects were discussed and questions answered. Patient to follow up in 4-5 months or sooner if symptoms worsen. Doretha Yuen MD I would like to thank you for the consult. Please do not hesitate if you have any questions about the patient care. This note is created with the assistance of a speech-recognition program. While intending to generate a document that actually reflects the content of the visit, the document can still have some errors including those of syntax and sound a- like substitutions which may escape proofreading. In such instances, actual meaning can be extrapolated by contextual derivation. Scribe Attestation:   By signing my name below, Diann Gonzalez, attest that this documentation has been prepared under the direction and in the presence of Jorge Saha MD.    Electronically Signed: Nabila Moreno. 05/04/22. 9:35 AM      I, Cheyenne Jc MD, personally performed the services described in this documentation. All medical record entries made by the scribe were at my direction and in my presence. I have reviewed the chart and discharge instructions (if applicable) and agree that the record reflects my personal performance and is accurate and complete.     Electronically Signed: Cheyenne Jc 5/15/2022 9:25 PM    Diplomate, American Board of Psychiatry and Neurology  Diplomate, American Board of Clinical Neurophysiology  Diplomate, American Board of Epilepsy

## 2022-05-04 NOTE — TELEPHONE ENCOUNTER
Dr Vicente Centeno saw Mr. Daren Rosales on 5/4/22. He wants to call and speak with his Gi specialist at [address] [place] [address] on Monday 5/9/22 to discuss his condition and possible use of Imuran. Please try to connect Dr. Vicente Centeno with GI on Monday.

## 2022-05-09 DIAGNOSIS — I10 ESSENTIAL HYPERTENSION: ICD-10-CM

## 2022-05-10 RX ORDER — AMLODIPINE BESYLATE 10 MG/1
TABLET ORAL
Qty: 90 TABLET | Refills: 1 | Status: SHIPPED | OUTPATIENT
Start: 2022-05-10 | End: 2022-08-11 | Stop reason: SDUPTHER

## 2022-05-10 NOTE — TELEPHONE ENCOUNTER
Bridget George is calling to request a refill on the following medication(s):    Medication Request:  Requested Prescriptions     Pending Prescriptions Disp Refills    amLODIPine (NORVASC) 10 MG tablet [Pharmacy Med Name: amlodipine 10 mg tablet] 90 tablet 1     Sig: TAKE ONE TABLET BY MOUTH EVERY DAY     Last filled 12/20/22 90 day 1 refill    Last Visit Date (If Applicable):  8/6/2008    Next Visit Date:    8/3/2022

## 2022-05-16 NOTE — TELEPHONE ENCOUNTER
. Neville Preciado called the office this morning to see if Dr. Noel Adhikari had spoke with the doctor at [address] [place] [address]. I told him I would check in to this further and call him back. This was discussed with Babs Estrella RN and she stated that she has tried two separate occasions to reach the GI physician, having to disconnect the line both times after being on hold over 15 minutes. Call placed to Mr. Neville Preciado and this information was given. Patient verbally stated his understanding.

## 2022-05-16 NOTE — TELEPHONE ENCOUNTER
Monica Woodson is calling to request a refill on the following medication(s):    Medication Request:  Requested Prescriptions     Pending Prescriptions Disp Refills    loratadine (CLARITIN) 10 MG tablet [Pharmacy Med Name: loratadine 10 mg tablet] 90 tablet 1     Sig: TAKE ONE TABLET BY MOUTH EVERY DAY       Last Visit Date (If Applicable):  2/7/1284    Next Visit Date:    8/3/2022

## 2022-05-17 RX ORDER — LORATADINE 10 MG/1
TABLET ORAL
Qty: 90 TABLET | Refills: 1 | Status: SHIPPED | OUTPATIENT
Start: 2022-05-17

## 2022-05-19 NOTE — TELEPHONE ENCOUNTER
I have called the GI department now multiple times. Today I was able to actually speak with someone in the GI department and then she transferred me and it was a voice mail. I did leave a message in that voice mail but I do not know if it was the correct department or not. I called Mr. Nate Ross to see if he had any contact number and he said he didn't unless he could find something in his papers. I told him if he as access to their My Chart system maybe he could send that  a message that Dr. Abhijit Mckinney is trying to reach him.

## 2022-05-25 RX ORDER — PRAVASTATIN SODIUM 40 MG
TABLET ORAL
Qty: 90 TABLET | Refills: 1 | Status: SHIPPED | OUTPATIENT
Start: 2022-05-25 | End: 2022-08-11 | Stop reason: SDUPTHER

## 2022-05-25 RX ORDER — VALSARTAN AND HYDROCHLOROTHIAZIDE 160; 12.5 MG/1; MG/1
TABLET, FILM COATED ORAL
Qty: 90 TABLET | Refills: 1 | Status: SHIPPED | OUTPATIENT
Start: 2022-05-25 | End: 2022-08-11 | Stop reason: SDUPTHER

## 2022-06-10 RX ORDER — PREDNISONE 20 MG/1
TABLET ORAL
Qty: 94 TABLET | Refills: 0 | Status: SHIPPED | OUTPATIENT
Start: 2022-06-10 | End: 2022-07-24

## 2022-06-10 NOTE — TELEPHONE ENCOUNTER
Patients wife Nai Alanis called back in today and wanted an update on the medication (steroid). She stated that patient is not doing as good as he was when he was present in [address] [place] [address]. She stated that patient is having a hard time swallowing. She stated if the steroid would help they would like for patient to try it.  Can you please advise

## 2022-06-10 NOTE — TELEPHONE ENCOUNTER
We have been trying to reach  out to GI at [facility] and I have tried to call neuromuscular clinic at [facility], Have not heard the response. Neuromuscular believes symptoms are atypical for myasthenia gravis however since patient has shown response to the medication I would continue mestinon. I would suggest to start prednisone 60 mg a day and taper down by 10 mg every 2 weeks and if his swallowing difficulty continues consider getting IVIG 2 g over 5 days.

## 2022-06-10 NOTE — TELEPHONE ENCOUNTER
Patient called back in wanting to know if he has to stop taking Pyridostigmine bromide 60 mg/ml.  Please advise

## 2022-06-14 ENCOUNTER — TELEPHONE (OUTPATIENT)
Dept: NEUROLOGY | Age: 62
End: 2022-06-14

## 2022-06-14 NOTE — TELEPHONE ENCOUNTER
I placed a call again to [address] [place] [address] [facility] trying to reach Dr. Miriam Bob. I called [phone]. I was transferred to Dr. Adrain Navarro office and spoke with Elvira. She will put through the message to Dr. Pretty Valdivia. not applicable (Male)

## 2022-06-17 ENCOUNTER — TELEPHONE (OUTPATIENT)
Dept: GASTROENTEROLOGY | Age: 62
End: 2022-06-17

## 2022-06-17 NOTE — TELEPHONE ENCOUNTER
Ann Mckeonjohn paul lisasanjuanita had Mobility Testing done at [facility]. Ra's on March 17, 2021 in Spring OP note in patients chart.     Patient informed

## 2022-06-17 NOTE — TELEPHONE ENCOUNTER
Writer spoke with patient regarding requested test by [facility] . Will give information to [address] [place] [address], Dr. Bj Gallo and contact patient with further instructions. Patient voiced understanding.

## 2022-06-17 NOTE — TELEPHONE ENCOUNTER
Dr. Abhijit Mckinney spoke Dr. Jerome Fleming Neuromuscular specialist. He does not feel that Mr. Nate Ross has Myasthenia. He advised Dr. Abhijit Mckinney that he does not feel he has MG and gardner snot feel that IVIG or steroids would be recommended. He said the pt. was seen at GI at [address] [place] [address] and was to have an esophageal manometry testing done but was not done. I called Mr. Nate Ross. He said that he could not have the testing done at [address] [place] [address] because they were not in network. I advised him he should call his local GI specialist. They can review CC and can probably order testing for him. He said he did think he had some benefit from the steroids Dr. Abhijit Mckinney put him on so he wondes if he should continue to take that.

## 2022-06-17 NOTE — TELEPHONE ENCOUNTER
Patient would like a call back regarding wanting a particular test to be ordered by [facility], that he will then share the results with the GI doctor he is seeing at the [facility]. (Patient could not remember the name of the test.) I told pt 's staff would give him a call back regarding this today. Please advise.

## 2022-06-17 NOTE — TELEPHONE ENCOUNTER
Patient will come to pharmacy office to : prescription.   Patient was advised of location and hours: Yes.   Patient was advised to bring photo identification: Yes.   Patient elects another party to  item: yes, name: Henrietta.   Spoke with Maribeth Henry in Dr. Sarah Carvajal office to inform Motility test already done 3/17/2021. Reports faxed to [phone]. Results under Surgeries tab in Epic and in MEDIA tab. Voiced understanding. Patient also informed that testspoken of by [facility] has already been done in [facility]. Patient voiced understanding.

## 2022-07-24 ENCOUNTER — HOSPITAL ENCOUNTER (EMERGENCY)
Age: 62
Discharge: HOME | End: 2022-07-24
Attending: EMERGENCY MEDICINE
Payer: COMMERCIAL

## 2022-07-24 VITALS
HEIGHT: 71 IN | HEART RATE: 85 BPM | WEIGHT: 245 LBS | OXYGEN SATURATION: 99 % | SYSTOLIC BLOOD PRESSURE: 155 MMHG | BODY MASS INDEX: 34.3 KG/M2 | DIASTOLIC BLOOD PRESSURE: 77 MMHG | RESPIRATION RATE: 14 BRPM | TEMPERATURE: 98.4 F

## 2022-07-24 DIAGNOSIS — H20.9 UVEITIS OF BOTH EYES: Primary | ICD-10-CM

## 2022-07-24 PROCEDURE — 99283 EMERGENCY DEPT VISIT LOW MDM: CPT

## 2022-07-24 RX ORDER — PREDNISOLONE ACETATE 10 MG/ML
1 SUSPENSION/ DROPS OPHTHALMIC 4 TIMES DAILY
Qty: 10 ML | Refills: 0 | Status: SHIPPED | OUTPATIENT
Start: 2022-07-24 | End: 2022-08-03

## 2022-07-24 ASSESSMENT — VISUAL ACUITY: OU: 1

## 2022-07-24 ASSESSMENT — PAIN SCALES - GENERAL: PAINLEVEL_OUTOF10: 10

## 2022-07-24 ASSESSMENT — TONOMETRY
OD_IOP_MMHG: 17
OS_IOP_MMHG: 22

## 2022-07-24 ASSESSMENT — PAIN - FUNCTIONAL ASSESSMENT: PAIN_FUNCTIONAL_ASSESSMENT: 0-10

## 2022-07-24 NOTE — ED PROVIDER NOTES
EMERGENCY DEPARTMENT ENCOUNTER    Pt Name: Abiola Rahman  MRN: [id number]  [place] 1960  Date of evaluation: 7/24/22  CHIEF COMPLAINT       Chief Complaint   Patient presents with    Other     Unable to see . Worse with lights on. HISTORY OF PRESENT ILLNESS   Patient is a 69-year-old male who presents to the ED for evaluation of red painful eyes. Symptoms started yesterday afternoon. He developed severe sensitivity to light. He also noticed mild blurry vision when reading the numbers on his watch. No no fevers, no headache, no field cuts. Never had symptoms like this before. No other issues at this time. ROS:  No fevers, cough, shortness of breath, chest pain, abdominal pain, nausea, vomiting, changes in urine or stool. REVIEW OF SYSTEMS     Review of Systems   All other systems reviewed and are negative. PASTMEDICAL HISTORY     Past Medical History:   Diagnosis Date    Arthritis     neck    BPH (benign prostatic hyperplasia)     Class 2 severe obesity due to excess calories with serious comorbidity in adult [facility])     Obesity (BMI 30.0-34. 9) [E66.9]    Dysphagia     Erectile dysfunction     Full dentures     GERD (gastroesophageal reflux disease)     Hiatal hernia     Hyperlipidemia     Hypertension     YUMIKO on CPAP     PONV (postoperative nausea and vomiting)     nauseated after last surgery    Unspecified sleep apnea     cpap nightly     SURGICAL HISTORY       Past Surgical History:   Procedure Laterality Date    COLONOSCOPY      ENDOSCOPY, COLON, DIAGNOSTIC      ESOPHAGEAL MOTILITY STUDY N/A 3/17/2021    ESOPHAGEAL MOTILITY STUDY performed by Xin Graham MD at [address] N/A 7/16/2021    CYSTO  FULGURATION  EXCISION PENILE WARTS performed by Ling Zayas MD at [address] [facility] N/A 7/31/2018    SUPRA PUBIC LESIONS BIOPSY EXCISION performed by Ling Zayas MD at [address] [facility] Bilateral 07/31/2018    SUPRA PUBIC LESIONS BIOPSY EXCISION (N/A )    THROAT SURGERY  2008    TONSILLECTOMY      TUMOR REMOVAL Right 2016    [facility]  right thigh   (benign)    UPPER GASTROINTESTINAL ENDOSCOPY N/A 2020    EGD BIOPSY performed by Keanu Johnson MD at [address] [place] [address]       Previous Medications    AMLODIPINE (NORVASC) 10 MG TABLET    TAKE ONE TABLET BY MOUTH EVERY DAY    AZELASTINE (ASTELIN) 0.1 % NASAL SPRAY    1 spray by Nasal route 2 times daily Use in each nostril as directed    FLUTICASONE (FLONASE) 50 MCG/ACT NASAL SPRAY    1 spray by Each Nostril route daily    LORATADINE (CLARITIN) 10 MG TABLET    TAKE ONE TABLET BY MOUTH EVERY DAY    OMEPRAZOLE (PRILOSEC) 20 MG DELAYED RELEASE CAPSULE    TAKE ONE CAPSULE BY MOUTH DAILY. TAKE 45 MINUTES PRIOR TO ALL OTHER MEDICATIONS. POTASSIUM CHLORIDE (KLOR-CON M) 20 MEQ EXTENDED RELEASE TABLET    TAKE ONE TABLET BY MOUTH EVERY DAY    PRAVASTATIN (PRAVACHOL) 40 MG TABLET    TAKE ONE TABLET BY MOUTH EVERY DAY    PYRIDOSTIGMINE BROMIDE 60 MG/5ML SOLN    TAKE 5 ML BY MOUTH THREE TIMES DAILY    SPIRONOLACTONE (ALDACTONE) 50 MG TABLET    TAKE ONE TABLET BY MOUTH EVERY DAY    TAMSULOSIN (FLOMAX) 0.4 MG CAPSULE    Take 0.4 mg by mouth daily    VALSARTAN-HYDROCHLOROTHIAZIDE (DIOVAN-HCT) 160-12.5 MG PER TABLET    TAKE ONE TABLET BY MOUTH EVERY DAY     ALLERGIES     is allergic to pcn [penicillins]. FAMILY HISTORY     He indicated that his mother is . He indicated that his father is alive. SOCIAL HISTORY       Social History     Tobacco Use    Smoking status: Never    Smokeless tobacco: Never   Vaping Use    Vaping Use: Never used   Substance Use Topics    Alcohol use: Not Currently    Drug use: No     PHYSICAL EXAM     INITIAL VITALS: BP (!) 155/77   Pulse 85   Temp 98.4 °F (36.9 °C) (Oral)   Resp 14   Ht 5' 11\" (1.803 m)   Wt 245 lb (111.1 kg)   SpO2 99%   BMI 34.17 kg/m²    Physical Exam  HENT:      Head: Normocephalic.       Right Ear: External ear normal. Left Ear: External ear normal.      Nose: Nose normal.   Eyes:      General: Lids are normal. Vision grossly intact. No visual field deficit. Right eye: No foreign body or discharge. Left eye: No foreign body or discharge. Intraocular pressure: Right eye pressure is 17 mmHg. Left eye pressure is 22 mmHg. Extraocular Movements:      Right eye: Normal extraocular motion. Left eye: Normal extraocular motion. Conjunctiva/sclera:      Right eye: Right conjunctiva is injected. Left eye: Left conjunctiva is injected. Pupils:      Right eye: No corneal abrasion or fluorescein uptake. Left eye: No corneal abrasion or fluorescein uptake. Slit lamp exam:     Right eye: Anterior chamber quiet. Left eye: Anterior chamber quiet. Visual Fields: Right eye visual fields normal and left eye visual fields normal.      Comments: Vision slightly blurred, moderate pain, photophobia, no discharge, . limbic injection, clear cornea, constricted pupil size, normally intraocular pressure. Cardiovascular:      Rate and Rhythm: Normal rate. Pulmonary:      Effort: Pulmonary effort is normal.   Abdominal:      General: Abdomen is flat. Skin:     General: Skin is dry. Neurological:      Mental Status: He is alert. Mental status is at baseline. Psychiatric:         Mood and Affect: Mood normal.         Behavior: Behavior normal.       MEDICAL DECISION MAKING:   The patient is hemodynamically stable, afebrile, nontoxic-appearing. Physical exam notable for Vision slightly blurred, moderate pain, photophobia, no discharge, . limbic injection, clear cornea, constricted pupil size, normally intraocular pressure. Based on history and exam likely uveitis. ED plan for steroid drops, close follow-up with ophthalmology.       DIAGNOSTIC RESULTS   EKG:All EKG's are interpreted by the Emergency Department Physician who either signs or Co-signs this chart in the absence of a cardiologist.        RADIOLOGY:All plain film, CT, MRI, and formal ultrasound images (except ED bedside ultrasound) are read by the radiologist, see reports below, unless otherwisenoted in MDM or here. No orders to display     LABS: All lab results were reviewed by myself, and all abnormals are listed below. Labs Reviewed - No data to display    EMERGENCY DEPARTMENTCOURSE:   Patient did well in the ED. Given Rx for prednisolone acetate drops. Given referral to ophthalmology, Dr. Aletha Ellington.  No further work-up indicated at this time. Nursing notes reviewed. At this time this is what I find, the patient appears well and does not appear sick or toxic. I gave my usual and customary discussion of the risks and benefits of discharge versus admission. I answered the patient's questions. I gave the patient strict return precautions. Patient expressed understanding of the discharge instructions. The care is provided during an unprecedented national emergency due to the novel coronavirus, COVID-19. Vitals:    Vitals:    07/24/22 1548   BP: (!) 155/77   Pulse: 85   Resp: 14   Temp: 98.4 °F (36.9 °C)   TempSrc: Oral   SpO2: 99%   Weight: 245 lb (111.1 kg)   Height: 5' 11\" (1.803 m)       The patient was given the following medications while in the emergency department:  Orders Placed This Encounter   Medications    prednisoLONE acetate (PRED FORTE) 1 % ophthalmic suspension     Sig: Place 1 drop into both eyes in the morning and 1 drop at noon and 1 drop in the evening and 1 drop before bedtime. Do all this for 10 days. Dispense:  10 mL     Refill:  0     CONSULTS:  None    FINAL IMPRESSION      1.  Uveitis of both eyes          DISPOSITION/PLAN   DISPOSITION Decision To Discharge 07/24/2022 04:50:30 PM      PATIENT REFERRED TO:  Archie Cervantes MD  [address] [facility]  [phone]    In 1 day    DISCHARGE MEDICATIONS:  New Prescriptions    PREDNISOLONE ACETATE (PRED FORTE) 1 % OPHTHALMIC SUSPENSION    Place 1 drop into both eyes in the morning and 1 drop at noon and 1 drop in the evening and 1 drop before bedtime. Do all this for 10 days.      Loree Mon MD  Attending Emergency Physician                    Ron Aldridge MD  07/24/22 0607

## 2022-07-24 NOTE — DISCHARGE INSTRUCTIONS
Return to this emergency room immediately if your symptoms persist, worsen or if new ones form. Make sure you follow-up with Dr. Nayan Gross tomorrow.

## 2022-08-03 ENCOUNTER — OFFICE VISIT (OUTPATIENT)
Dept: INTERNAL MEDICINE CLINIC | Age: 62
End: 2022-08-03
Payer: COMMERCIAL

## 2022-08-03 VITALS
BODY MASS INDEX: 36.23 KG/M2 | SYSTOLIC BLOOD PRESSURE: 110 MMHG | DIASTOLIC BLOOD PRESSURE: 68 MMHG | HEART RATE: 70 BPM | HEIGHT: 71 IN | WEIGHT: 258.8 LBS | OXYGEN SATURATION: 98 %

## 2022-08-03 DIAGNOSIS — K21.9 GASTROESOPHAGEAL REFLUX DISEASE WITHOUT ESOPHAGITIS: ICD-10-CM

## 2022-08-03 DIAGNOSIS — G47.33 OSA ON CPAP: ICD-10-CM

## 2022-08-03 DIAGNOSIS — N52.01 ERECTILE DYSFUNCTION DUE TO ARTERIAL INSUFFICIENCY: ICD-10-CM

## 2022-08-03 DIAGNOSIS — M67.919 TENDINOPATHY OF ROTATOR CUFF, UNSPECIFIED LATERALITY: ICD-10-CM

## 2022-08-03 DIAGNOSIS — M50.90 DISORDER OF INTERVERTEBRAL DISC OF CERVICAL SPINE: ICD-10-CM

## 2022-08-03 DIAGNOSIS — N18.30 STAGE 3 CHRONIC KIDNEY DISEASE, UNSPECIFIED WHETHER STAGE 3A OR 3B CKD (HCC): ICD-10-CM

## 2022-08-03 DIAGNOSIS — Z99.89 OSA ON CPAP: ICD-10-CM

## 2022-08-03 DIAGNOSIS — D36.7 BENIGN NEOPLASM OF LOWER EXTREMITY: ICD-10-CM

## 2022-08-03 DIAGNOSIS — Z98.890 HISTORY OF ESOPHAGEAL DILATATION: ICD-10-CM

## 2022-08-03 DIAGNOSIS — N40.0 BENIGN PROSTATIC HYPERPLASIA WITHOUT LOWER URINARY TRACT SYMPTOMS: Primary | ICD-10-CM

## 2022-08-03 DIAGNOSIS — R13.12 OROPHARYNGEAL DYSPHAGIA: ICD-10-CM

## 2022-08-03 DIAGNOSIS — F45.8 ANXIETY HYPERVENTILATION: ICD-10-CM

## 2022-08-03 DIAGNOSIS — G70.00 MYASTHENIA GRAVIS (HCC): ICD-10-CM

## 2022-08-03 DIAGNOSIS — F41.9 ANXIETY HYPERVENTILATION: ICD-10-CM

## 2022-08-03 DIAGNOSIS — I10 ESSENTIAL HYPERTENSION: ICD-10-CM

## 2022-08-03 DIAGNOSIS — E78.2 MIXED HYPERLIPIDEMIA: ICD-10-CM

## 2022-08-03 DIAGNOSIS — J30.1 SEASONAL ALLERGIC RHINITIS DUE TO POLLEN: ICD-10-CM

## 2022-08-03 PROCEDURE — G8417 CALC BMI ABV UP PARAM F/U: HCPCS | Performed by: FAMILY MEDICINE

## 2022-08-03 PROCEDURE — 3017F COLORECTAL CA SCREEN DOC REV: CPT | Performed by: FAMILY MEDICINE

## 2022-08-03 PROCEDURE — 1036F TOBACCO NON-USER: CPT | Performed by: FAMILY MEDICINE

## 2022-08-03 PROCEDURE — G8427 DOCREV CUR MEDS BY ELIG CLIN: HCPCS | Performed by: FAMILY MEDICINE

## 2022-08-03 PROCEDURE — 99214 OFFICE O/P EST MOD 30 MIN: CPT | Performed by: FAMILY MEDICINE

## 2022-08-03 SDOH — ECONOMIC STABILITY: FOOD INSECURITY: WITHIN THE PAST 12 MONTHS, YOU WORRIED THAT YOUR FOOD WOULD RUN OUT BEFORE YOU GOT MONEY TO BUY MORE.: NEVER TRUE

## 2022-08-03 SDOH — ECONOMIC STABILITY: FOOD INSECURITY: WITHIN THE PAST 12 MONTHS, THE FOOD YOU BOUGHT JUST DIDN'T LAST AND YOU DIDN'T HAVE MONEY TO GET MORE.: NEVER TRUE

## 2022-08-03 ASSESSMENT — ENCOUNTER SYMPTOMS
EYES NEGATIVE: 1
RESPIRATORY NEGATIVE: 1
GASTROINTESTINAL NEGATIVE: 1
BACK PAIN: 1
ALLERGIC/IMMUNOLOGIC NEGATIVE: 1

## 2022-08-03 ASSESSMENT — SOCIAL DETERMINANTS OF HEALTH (SDOH): HOW HARD IS IT FOR YOU TO PAY FOR THE VERY BASICS LIKE FOOD, HOUSING, MEDICAL CARE, AND HEATING?: NOT HARD AT ALL

## 2022-08-03 NOTE — PROGRESS NOTES
Subjective:      Patient ID: Monty Hernandez is a 59 y.o. male. Hypertension  This is a chronic problem. The current episode started more than 1 month ago. The problem has been gradually improving since onset. The problem is controlled. Associated symptoms include anxiety. Risk factors for coronary artery disease include stress and male gender. Past treatments include angiotensin blockers, diuretics and calcium channel blockers. The current treatment provides moderate improvement. There are no compliance problems. Hypertensive end-organ damage includes kidney disease. Identifiable causes of hypertension include chronic renal disease and sleep apnea. Review of Systems   Constitutional: Negative. HENT: Negative. Eyes: Negative. Respiratory: Negative. Cardiovascular: Negative. Gastrointestinal: Negative. Endocrine: Negative. Musculoskeletal:  Positive for arthralgias and back pain. Skin: Negative. Allergic/Immunologic: Negative. Neurological: Negative. Hematological: Negative. Psychiatric/Behavioral:  The patient is nervous/anxious. Past family and social history unremarkable. Diagnosis Orders   1. Benign prostatic hyperplasia without lower urinary tract symptoms        2. Erectile dysfunction due to arterial insufficiency        3. Gastroesophageal reflux disease without esophagitis        4. Tendinopathy of rotator cuff, unspecified laterality        5. YUMIKO on CPAP        6. Anxiety hyperventilation        7. Benign neoplasm of lower extremity        8. Seasonal allergic rhinitis due to pollen        9. Oropharyngeal dysphagia        10. Stage 3 chronic kidney disease, unspecified whether stage 3a or 3b CKD ([address]        11. Disorder of intervertebral disc of cervical spine        12. Myasthenia gravis ([address]        13. Essential hypertension        14. Mixed hyperlipidemia        15.  History of esophageal dilatation            Objective:   Physical Exam  Vitals and nursing note reviewed. Constitutional:       Appearance: He is well-developed. Comments: Sleep apnea on CPAP   HENT:      Head: Normocephalic and atraumatic. Right Ear: External ear normal.      Left Ear: External ear normal.      Nose: Nose normal.   Eyes:      Conjunctiva/sclera: Conjunctivae normal.      Pupils: Pupils are equal, round, and reactive to light. Cardiovascular:      Rate and Rhythm: Normal rate and regular rhythm. Heart sounds: Normal heart sounds. Comments: Essential hypertension  Pulmonary:      Effort: Pulmonary effort is normal.      Breath sounds: Normal breath sounds. Abdominal:      General: Bowel sounds are normal.      Palpations: Abdomen is soft. Comments: Mechanical dysphagia necessitating frequent dilatation   Genitourinary:     Comments: BPH  CKD  Musculoskeletal:         General: Normal range of motion. Cervical back: Normal range of motion and neck supple. Skin:     General: Skin is warm and dry. Neurological:      Mental Status: He is alert and oriented to person, place, and time. Deep Tendon Reflexes: Reflexes are normal and symmetric. Comments: Myasthenia gravis   Psychiatric:      Comments: Anxious       Assessment:       Diagnosis Orders   1. Benign prostatic hyperplasia without lower urinary tract symptoms        2. Erectile dysfunction due to arterial insufficiency        3. Gastroesophageal reflux disease without esophagitis        4. Tendinopathy of rotator cuff, unspecified laterality        5. YUMIKO on CPAP        6. Anxiety hyperventilation        7. Benign neoplasm of lower extremity        8. Seasonal allergic rhinitis due to pollen        9. Oropharyngeal dysphagia        10. Stage 3 chronic kidney disease, unspecified whether stage 3a or 3b CKD ([address]        11. Disorder of intervertebral disc of cervical spine        12. Myasthenia gravis ([address]        13. Essential hypertension        14. Mixed hyperlipidemia        15. History of esophageal dilatation                Plan:      68-year-old -American male returns for follow-up. He is afebrile hemodynamically stable  History of myasthenia gravis on pyridostigmine by neurology in [place] as well as [facility] clinic. Ongoing mechanical dysphagia. He has last esophageal dilatation at [facility] in January 2022. He is planned for elective manometry  Patient states that his insurance would not cover his visit to [facility]. He is advised to discuss with [place] neurologist  Upper lid droops secondary to myasthenia gravis  Hypertension well-controlled to amlodipine, Diovan HCT, Aldactone that he is tolerating well. Consume less than 2 g of salt a day  Allergies no recent flare. Allergic rhinitis. Continue Flonase, nasal saline antihistamine  GERD stable on proton pump inhibitor. He is established with GI  Hyperlipidemia on statin that he is tolerating well  Glaucoma on Pred Forte ophthalmic  BPH on Flomax  CRI with creatinine of 1.03. Avoid nephrotoxic drugs, anti-inflammatory radiocontrast  He is up-to-date influenza, COVID-vaccine, pneumococcal vaccine  He is advised to get meningococcal vaccine and Zostavax  He is anxious heart denies being depressed  Med list and available labs reviewed, discussed with patient, questions answered  Is encouraged to call for any concern  This note is created with a voice recognition program and while intend to generate a document that accurately reflects the content of the visit, no guarantee can be provided that every mistake has been identified and corrected by editing.           Jon Pierson MD

## 2022-08-10 DIAGNOSIS — I10 ESSENTIAL HYPERTENSION: ICD-10-CM

## 2022-08-10 NOTE — TELEPHONE ENCOUNTER
Jesús Winkler is calling to request a refill on the following medication(s):    Medication Request:  Requested Prescriptions     Pending Prescriptions Disp Refills    pravastatin (PRAVACHOL) 40 MG tablet 90 tablet 1     Sig: TAKE ONE TABLET BY MOUTH EVERY DAY    valsartan-hydroCHLOROthiazide (DIOVAN-HCT) 160-12.5 MG per tablet 90 tablet 1     Sig: TAKE ONE TABLET BY MOUTH EVERY DAY    amLODIPine (NORVASC) 10 MG tablet 90 tablet 1       Last Visit Date (If Applicable):  8/5/9547    Next Visit Date:    12/5/2022

## 2022-08-11 RX ORDER — PRAVASTATIN SODIUM 40 MG
TABLET ORAL
Qty: 90 TABLET | Refills: 1 | Status: SHIPPED | OUTPATIENT
Start: 2022-08-11

## 2022-08-11 RX ORDER — VALSARTAN AND HYDROCHLOROTHIAZIDE 160; 12.5 MG/1; MG/1
TABLET, FILM COATED ORAL
Qty: 90 TABLET | Refills: 1 | Status: SHIPPED | OUTPATIENT
Start: 2022-08-11

## 2022-08-11 RX ORDER — AMLODIPINE BESYLATE 10 MG/1
10 TABLET ORAL DAILY
Qty: 90 TABLET | Refills: 1 | Status: SHIPPED | OUTPATIENT
Start: 2022-08-11

## 2022-08-16 ENCOUNTER — OFFICE VISIT (OUTPATIENT)
Dept: GASTROENTEROLOGY | Age: 62
End: 2022-08-16
Payer: COMMERCIAL

## 2022-08-16 VITALS
HEART RATE: 62 BPM | DIASTOLIC BLOOD PRESSURE: 67 MMHG | HEIGHT: 71 IN | BODY MASS INDEX: 36.26 KG/M2 | WEIGHT: 259 LBS | SYSTOLIC BLOOD PRESSURE: 120 MMHG

## 2022-08-16 DIAGNOSIS — A04.8 H. PYLORI INFECTION: ICD-10-CM

## 2022-08-16 DIAGNOSIS — R13.10 DYSPHAGIA, UNSPECIFIED TYPE: ICD-10-CM

## 2022-08-16 DIAGNOSIS — R49.9 CHANGE IN VOICE: ICD-10-CM

## 2022-08-16 DIAGNOSIS — K21.9 GASTROESOPHAGEAL REFLUX DISEASE WITHOUT ESOPHAGITIS: Primary | ICD-10-CM

## 2022-08-16 PROCEDURE — 99214 OFFICE O/P EST MOD 30 MIN: CPT | Performed by: INTERNAL MEDICINE

## 2022-08-16 PROCEDURE — G8427 DOCREV CUR MEDS BY ELIG CLIN: HCPCS | Performed by: INTERNAL MEDICINE

## 2022-08-16 PROCEDURE — G8417 CALC BMI ABV UP PARAM F/U: HCPCS | Performed by: INTERNAL MEDICINE

## 2022-08-16 PROCEDURE — 3017F COLORECTAL CA SCREEN DOC REV: CPT | Performed by: INTERNAL MEDICINE

## 2022-08-16 PROCEDURE — 1036F TOBACCO NON-USER: CPT | Performed by: INTERNAL MEDICINE

## 2022-08-16 RX ORDER — OMEPRAZOLE 20 MG/1
20 TABLET, DELAYED RELEASE ORAL EVERY EVENING
Qty: 30 TABLET | Refills: 3 | Status: SHIPPED | OUTPATIENT
Start: 2022-08-16 | End: 2022-08-25 | Stop reason: SDUPTHER

## 2022-08-16 ASSESSMENT — ENCOUNTER SYMPTOMS
WHEEZING: 0
COUGH: 0
ANAL BLEEDING: 0
APNEA: 0
BLOOD IN STOOL: 0
RECTAL PAIN: 0
CONSTIPATION: 0
ABDOMINAL PAIN: 0
VOICE CHANGE: 0
DIARRHEA: 0
ABDOMINAL DISTENTION: 0
COLOR CHANGE: 0
SHORTNESS OF BREATH: 0
TROUBLE SWALLOWING: 1
NAUSEA: 0
CHOKING: 0
SORE THROAT: 0
VOMITING: 0

## 2022-08-16 NOTE — PROGRESS NOTES
GI FOLLOW UP    INTERVAL HISTORY:       Subacute progression of the patient's oropharyngeal dysphagia and dysphonia that appears to be neuromuscular in nature. Ongoing work-up for myasthenia gravis and medication adjustments being discussed    Chief Complaint   Patient presents with    Follow-up     Oropharyngeal Dysphagia       1. Gastroesophageal reflux disease without esophagitis    2. H. pylori infection    3. Change in voice    4. Dysphagia, unspecified type          HISTORY OF PRESENT ILLNESS: Merlene Iyer is a 59 y.o. 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  Patient reports that he had scrambled eggs and toast and since then he has been having difficulty swallowing localized to the oropharyngeal area. Denies any globus sensation. Denies any regurgitation or chest symptoms. No other GI symptoms. Patient reports that he is able to tolerate liquids with more thicker consistency currently which is improvement from previous. Has yet to try solid food due to fear of dysphagia. Denies any neurological symptoms that might suggest a possible TIA or CVA. 2007--throat surgery-- resection of benign growth around vocal cords. Smoker: none   Drinking history: 10 months quit, social  Abdominal surgeries: none   Prior Colonoscopy: 2017--next in 10 yrs  Prior EGD: 2010 dysphagia symptoms at that time  FH of GI issues: none    Past Medical,Family, and Social History reviewed and does contribute to the patient presenting condition.     Patient's PMH/PSH,SH,PSYCH Hx, MEDs, ALLERGIES, and ROS were all reviewed and updated in the appropriate sections. PAST MEDICAL HISTORY:  Past Medical History:   Diagnosis Date    Arthritis     neck    BPH (benign prostatic hyperplasia)     Class 2 severe obesity due to excess calories with serious comorbidity in adult [facility])     Obesity (BMI 30.0-34. 9) [E66.9]    Dysphagia     Erectile dysfunction     Full dentures     GERD (gastroesophageal reflux disease)     Hiatal hernia     Hyperlipidemia     Hypertension     YUMIKO on CPAP     PONV (postoperative nausea and vomiting)     nauseated after last surgery    Unspecified sleep apnea     cpap nightly       Past Surgical History:   Procedure Laterality Date    COLONOSCOPY      ENDOSCOPY, COLON, DIAGNOSTIC      ESOPHAGEAL MOTILITY STUDY N/A 3/17/2021    ESOPHAGEAL MOTILITY STUDY performed by Eladio Wagner MD at [address] 7/16/2021    CYSTO  FULGURATION  EXCISION PENILE WARTS performed by Julita Good MD at [address] N/A 7/31/2018    SUPRA PUBIC LESIONS BIOPSY EXCISION performed by Julita Good MD at [facility] Bilateral 07/31/2018    SUPRA PUBIC LESIONS BIOPSY EXCISION (N/A )    THROAT SURGERY  2008    TONSILLECTOMY      TUMOR REMOVAL Right 09/23/2016    [facility]  right thigh   (benign)    UPPER GASTROINTESTINAL ENDOSCOPY N/A 12/21/2020    EGD BIOPSY performed by Eladio Wagner MD at [facility]       CURRENT MEDICATIONS:    Current Outpatient Medications:     omeprazole (PRILOSEC OTC) 20 MG tablet, Take 1 tablet by mouth every evening, Disp: 30 tablet, Rfl: 3    pravastatin (PRAVACHOL) 40 MG tablet, TAKE ONE TABLET BY MOUTH EVERY DAY, Disp: 90 tablet, Rfl: 1    valsartan-hydroCHLOROthiazide (DIOVAN-HCT) 160-12.5 MG per tablet, TAKE ONE TABLET BY MOUTH EVERY DAY, Disp: 90 tablet, Rfl: 1    amLODIPine (NORVASC) 10 MG tablet, Take 1 tablet by mouth in the morning., Disp: 90 tablet, Rfl: 1    loratadine (CLARITIN) 10 MG tablet, TAKE ONE TABLET BY MOUTH EVERY DAY, Disp: 90 tablet, Rfl: 1    Pyridostigmine Bromide 60 MG/5ML SOLN, TAKE 5 ML BY MOUTH THREE TIMES DAILY, Disp: 450 mL, Rfl: 5    fluticasone (FLONASE) 50 MCG/ACT nasal spray, 1 spray by Each Nostril route daily (Patient taking differently: 1 spray by Each Nostril route daily Indications: Patient using prn), Disp: 2 Bottle, Rfl: 1    azelastine (ASTELIN) 0.1 % nasal spray, 1 spray by Nasal route 2 times daily Use in each nostril as directed (Patient taking differently: 1 spray by Nasal route 2 times daily Indications: Patient using PRN Use in each nostril as directed), Disp: 2 Bottle, Rfl: 1    tamsulosin (FLOMAX) 0.4 MG capsule, Take 0.4 mg by mouth daily, Disp: , Rfl:     potassium chloride (KLOR-CON M) 20 MEQ extended release tablet, TAKE ONE TABLET BY MOUTH EVERY DAY (Patient not taking: Reported on 8/16/2022), Disp: 90 tablet, Rfl: 1    spironolactone (ALDACTONE) 50 MG tablet, TAKE ONE TABLET BY MOUTH EVERY DAY (Patient not taking: Reported on 8/16/2022), Disp: 90 tablet, Rfl: 0    ALLERGIES:   Allergies   Allergen Reactions    Pcn [Penicillins]      Patient states he was told he had allergy as a child. Has taken penicillin related medications in past with no problems.        FAMILY HISTORY:       Problem Relation Age of Onset    Heart Disease Mother          SOCIAL HISTORY:   Social History     Socioeconomic History    Marital status:      Spouse name: Not on file    Number of children: Not on file    Years of education: Not on file    Highest education level: Not on file   Occupational History    Not on file   Tobacco Use    Smoking status: Never    Smokeless tobacco: Never   Vaping Use    Vaping Use: Never used   Substance and Sexual Activity    Alcohol use: Not Currently    Drug use: No    Sexual activity: Not on file   Other Topics Concern    Not on file   Social History Narrative    Not on file     Social Determinants of Health     Financial Resource Strain: Low Risk     Difficulty of Paying Living Expenses: Not hard at all   Food Insecurity: No Food Insecurity    Worried About Running Out of Food in the Last Year: Never true    Ran Out of Food in the Last Year: Never true   Transportation Needs: Not on file   Physical Activity: Not on file   Stress: Not on file   Social Connections: Not on file   Intimate Partner Violence: Not on file   Housing Stability: Not on file       REVIEW OF SYSTEMS: A 12-point review of systems was obtained and pertinent positives and negatives were listed below. REVIEW OF SYSTEMS:     Constitutional: No fever, no chills, no lethargy, no weakness. HEENT:  No headache, otalgia, itchy eyes, nasal discharge or sore throat. Cardiac:  No chest pain, dyspnea, orthopnea or PND. Chest:   No cough, phlegm or wheezing. Abdomen:      Detailed by MA   Neuro:  No focal weakness, abnormal movements or seizure like activity. Skin:   No rashes, no itching. :   No hematuria, no pyuria, no dysuria, no flank pain. Extremities:  No swelling or joint pains. ROS was otherwise negative    Review of Systems   Constitutional:  Negative for appetite change, fatigue, fever and unexpected weight change. HENT:  Positive for trouble swallowing. Negative for sore throat and voice change. Eyes:  Negative for visual disturbance. Respiratory:  Negative for apnea, cough, choking, shortness of breath and wheezing. Cardiovascular:  Negative for chest pain, palpitations and leg swelling. Gastrointestinal:  Negative for abdominal distention, abdominal pain, anal bleeding, blood in stool, constipation, diarrhea, nausea, rectal pain and vomiting. Genitourinary:  Negative for difficulty urinating. Skin:  Negative for color change and rash. Neurological:  Negative for dizziness, seizures, weakness, light-headedness, numbness and headaches. Hematological:  Does not bruise/bleed easily. Psychiatric/Behavioral:  Negative for confusion and sleep disturbance. The patient is not nervous/anxious. PHYSICAL EXAMINATION: Vital signs reviewed per the nursing documentation. /67   Pulse 62   Ht 5' 11\" (1.803 m)   Wt 259 lb (117.5 kg)   BMI 36.12 kg/m²   Body mass index is 36.12 kg/m². Physical Exam    Physical Exam   Constitutional: Patient is oriented to person, place, and time. Patient appears well-developed and well-nourished. HENT:   Head: Normocephalic and atraumatic. Eyes: Pupils are equal, round, and reactive to light. EOM are normal.   Neck: Normal range of motion. Neck supple. No JVD present. No tracheal deviation present. No thyromegaly present. Cardiovascular: Normal rate, regular rhythm, normal heart sounds and intact distal pulses. Pulmonary/Chest: Effort normal and breath sounds normal. No stridor. No respiratory distress. He has no wheezes. He has no rales. He exhibits no tenderness. Abdominal: Soft. Bowel sounds are normal. He exhibits no distension and no mass. There is no tenderness. There is no rebound and no guarding. No hernia. Musculoskeletal: Normal range of motion. Lymphadenopathy:    Patient has no cervical adenopathy. Neurological: Patient is alert and oriented to person, place, and time. Psychiatric: Patient has a normal mood and affect.  Patient behavior is normal.       LABORATORY DATA: Reviewed  Lab Results   Component Value Date    WBC 5.1 04/06/2022    HGB 13.6 04/06/2022    HCT 41.8 04/06/2022    MCV 86.5 04/06/2022     04/06/2022     04/06/2022    K 3.8 04/06/2022     04/06/2022    CO2 28 04/06/2022    BUN 12 04/06/2022    CREATININE 1.03 04/06/2022    LABPROT 7.9 05/09/2012    LABALBU 4.6 04/06/2022    BILITOT 0.94 04/06/2022    ALKPHOS 85 04/06/2022    AST 21 04/06/2022    ALT 23 04/06/2022         Lab Results   Component Value Date    RBC 4.83 04/06/2022    HGB 13.6 04/06/2022    MCV 86.5 04/06/2022    MCH 28.2 04/06/2022    MCHC 32.5 04/06/2022    RDW 13.7 04/06/2022    MPV 9.5 04/06/2022    BASOPCT 1 07/20/2018 LYMPHSABS 1.60 07/20/2018    MONOSABS 0.40 07/20/2018    NEUTROABS 3.00 07/20/2018    EOSABS 0.00 07/20/2018    BASOSABS 0.00 07/20/2018         DIAGNOSTIC TESTING:     No results found. IMPRESSION: Mr.Ernest Ryan Ramirez is a 56 y.o. 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  Patient reports that he had scrambled eggs and toast and since then he has been having difficulty swallowing localized to the oropharyngeal area. Denies any globus sensation. Denies any regurgitation or chest symptoms. No other GI symptoms. Patient reports that he is able to tolerate liquids with more thicker consistency currently which is improvement from previous. Has yet to try solid food due to fear of dysphagia. Denies any neurological symptoms that might suggest a possible TIA or CVA. 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-eradicated with negative breath test  Continues to have difficulty swallowing with solid food only-no structural causes identified during upper endoscopy and upper GI/esophagram  Esophageal biopsies were negative  Reports swallowing-\" fatigue \"  Unable to initiate swallowing during fatigue episodes  Was referred to neurology-myasthenia gravis antibodies appear to be favoring diagnosis        Had emperic dilation of non-obstructive Schajavyki at [facility] gastroenterology            Assessment  1. Gastroesophageal reflux disease without esophagitis    2. H. pylori infection    3. Change in voice    4. Dysphagia, unspecified type        Traci Martin was seen today for follow-up.     Diagnoses and all orders for this visit:    Gastroesophageal reflux disease without esophagitis-patient continue with Prilosec 20 mg every evening. Symptoms appear to well-controlled    H. pylori infection-treated and eradicated with negative breath test.    Change in voice-presumably related to the patient's previous presumed diagnosis of myasthenia gravis, ongoing work-up by neurology. Responded well with pyridostigmine however developing tolerance, ongoing follow-up with neurology. Dysphagia, unspecified type-neuromuscular nature, had empiric dilation at [facility]. This appears to be completely resolved. Other orders  -     omeprazole (PRILOSEC OTC) 20 MG tablet; Take 1 tablet by mouth every evening         RTC: 3 months    Additional comments: Thank you for allowing me to participate in the care of Mr. Dio Arroyo. For any further questions please do not hesitate to contact me. I have reviewed and agree with the ROS entered by the MA/LPN from today's encounter documented in a separate note. Xin Graham MD, MPH   Board Certified in Gastroenterology  Board Certified in [address] [place] [address] #: [phone]    this note is created with the assistance of a speech recognition program.  While intending to generate a document that actually reflects the content of the visit, the document can still have some errors including those of syntax and sound a like substitutions which may escape proof reading. It such instances, actual meaning can be extrapolated by contextual diversion.

## 2022-08-20 DIAGNOSIS — A04.8 H. PYLORI INFECTION: ICD-10-CM

## 2022-08-22 ENCOUNTER — TELEPHONE (OUTPATIENT)
Dept: GASTROENTEROLOGY | Age: 62
End: 2022-08-22

## 2022-08-22 RX ORDER — OMEPRAZOLE 20 MG/1
20 TABLET, DELAYED RELEASE ORAL DAILY
Qty: 30 TABLET | Refills: 3 | Status: SHIPPED | OUTPATIENT
Start: 2022-08-22

## 2022-08-24 NOTE — TELEPHONE ENCOUNTER
Patient called stating that the omeprazole 20 mg was suppose to go to [facility] pharmacy in [place].

## 2022-08-25 RX ORDER — OMEPRAZOLE 20 MG/1
20 TABLET, DELAYED RELEASE ORAL EVERY EVENING
Qty: 30 TABLET | Refills: 3 | Status: SHIPPED | OUTPATIENT
Start: 2022-08-25

## 2022-08-26 RX ORDER — OMEPRAZOLE 20 MG/1
CAPSULE, DELAYED RELEASE ORAL
Qty: 90 CAPSULE | Refills: 1 | Status: SHIPPED | OUTPATIENT
Start: 2022-08-26

## 2022-09-06 ENCOUNTER — OFFICE VISIT (OUTPATIENT)
Dept: NEUROLOGY | Age: 62
End: 2022-09-06
Payer: COMMERCIAL

## 2022-09-06 ENCOUNTER — TELEPHONE (OUTPATIENT)
Dept: NEUROLOGY | Age: 62
End: 2022-09-06

## 2022-09-06 VITALS
DIASTOLIC BLOOD PRESSURE: 78 MMHG | HEIGHT: 72 IN | WEIGHT: 255 LBS | BODY MASS INDEX: 34.54 KG/M2 | SYSTOLIC BLOOD PRESSURE: 125 MMHG | HEART RATE: 56 BPM

## 2022-09-06 DIAGNOSIS — G70.00 MYASTHENIA GRAVIS (HCC): Primary | ICD-10-CM

## 2022-09-06 PROCEDURE — 1036F TOBACCO NON-USER: CPT | Performed by: PSYCHIATRY & NEUROLOGY

## 2022-09-06 PROCEDURE — G8417 CALC BMI ABV UP PARAM F/U: HCPCS | Performed by: PSYCHIATRY & NEUROLOGY

## 2022-09-06 PROCEDURE — G8427 DOCREV CUR MEDS BY ELIG CLIN: HCPCS | Performed by: PSYCHIATRY & NEUROLOGY

## 2022-09-06 PROCEDURE — 99214 OFFICE O/P EST MOD 30 MIN: CPT | Performed by: PSYCHIATRY & NEUROLOGY

## 2022-09-06 PROCEDURE — 3017F COLORECTAL CA SCREEN DOC REV: CPT | Performed by: PSYCHIATRY & NEUROLOGY

## 2022-09-06 NOTE — TELEPHONE ENCOUNTER
Spoke with  from [facility] Ko Mcneal.  Confirmed eligibility status on pt he is currently active

## 2022-09-06 NOTE — PROGRESS NOTES
[facility], [address], [address] [place] [address]  Ph: [phone] or [phone]  FAX: [phone]    Chief Complaint: Oropharyngeal Dysphagia, myasthenia gravis     Dear Edin Gallagher MD     I had the pleasure of seeing your patient today in neurology consultation for his symptoms. As you would recall Natalee Hernandez is a 59 y.o. male. Patient has reports with a history of difficulty swallowing since November 2020. Patient has reported to have been seen by multiple specialist in the past and was referred to neurology. He has previously completed a swallow test which was unable to identify cause of difficulty. He admits his weight has remained stable once he found foods he could eat with minimal difficulty. He reports he is eating softer, smaller foods with more ease. Furthermore, he still experiences numbness and tingling in his fingers on the right hand thought to be caused by the injury. Patient reports his diagnosis was not consistent with myasthenia gravis at this time and saw the [facility] for a second opinion. Denies urine and bowel incontinence. The patient is presenting today as a follow-up on 9/6/2022. He reports that his symptoms have been stable since his previous visit when he was given steroids. However, he reports that his swallowing has not returned to baseline yet. He reports improvement in his conversational skills, breathing, and can eat more foods. He denies any side effects of the medications. He continues to have difficulty swallowing and has to watch what he eats. He denies diplopia. Past Medical History:   Diagnosis Date    Arthritis     neck    BPH (benign prostatic hyperplasia)     Class 2 severe obesity due to excess calories with serious comorbidity in adult [facility])     Obesity (BMI 30.0-34. 9) [E66.9]    Dysphagia     Erectile dysfunction     Full dentures     GERD (gastroesophageal reflux disease)     Hiatal hernia Hyperlipidemia     Hypertension     YUMIKO on CPAP     PONV (postoperative nausea and vomiting)     nauseated after last surgery    Unspecified sleep apnea     cpap nightly     Past Surgical History:   Procedure Laterality Date    COLONOSCOPY      ENDOSCOPY, COLON, DIAGNOSTIC      ESOPHAGEAL MOTILITY STUDY N/A 3/17/2021    ESOPHAGEAL MOTILITY STUDY performed by Natalia Maloney MD at [address] N/A 7/16/2021    CYSTO  FULGURATION  EXCISION PENILE WARTS performed by Arnaud Ramirez MD at [address] N/A 7/31/2018    SUPRA PUBIC LESIONS BIOPSY EXCISION performed by Arnaud Ramirez MD at [address] Bilateral 07/31/2018    SUPRA PUBIC LESIONS BIOPSY EXCISION (N/A )    THROAT SURGERY  2008    TONSILLECTOMY      TUMOR REMOVAL Right 09/23/2016    [facility]  right thigh   (benign)    UPPER GASTROINTESTINAL ENDOSCOPY N/A 12/21/2020    EGD BIOPSY performed by Natalia Maloney MD at [address] [Penicillins]      Patient states he was told he had allergy as a child. Has taken penicillin related medications in past with no problems.      Family History   Problem Relation Age of Onset    Heart Disease Mother       Social History     Socioeconomic History    Marital status:      Spouse name: Not on file    Number of children: Not on file    Years of education: Not on file    Highest education level: Not on file   Occupational History    Not on file   Tobacco Use    Smoking status: Never    Smokeless tobacco: Never   Vaping Use    Vaping Use: Never used   Substance and Sexual Activity    Alcohol use: Not Currently    Drug use: No    Sexual activity: Not on file   Other Topics Concern    Not on file   Social History Narrative    Not on file     Social Determinants of Health     Financial Resource Strain: Low Risk     Difficulty of Paying Living Expenses: Not hard at all   Food Insecurity: No Food Insecurity    Worried About Running Out of Food in the Last Year: Never true    Ran Out of Food in the Last Year: Never true   Transportation Needs: Not on file   Physical Activity: Not on file   Stress: Not on file   Social Connections: Not on file   Intimate Partner Violence: Not on file   Housing Stability: Not on file      There were no vitals taken for this visit. HEENT [] Hearing Loss  [] Visual Disturbance  [] Tinnitus  [] Eye pain   Respiratory [] Shortness of Breath  [] Cough  [] Snoring   Cardiovascular [] Chest Pain  [] Palpitations  [] Lightheaded   GI [] Constipation  [] Diarrhea  [x] Swallowing change  [] Nausea/vomiting    [] Urinary Frequency  [] Urinary Urgency   Musculoskeletal [] Neck pain  [] Back pain  [] Muscle pain  [] Restless legs   Dermatologic [] Skin changes   Neurologic [] Memory loss/confusion  [] Seizures  [] Trouble walking or imbalance  [] Dizziness  [] Sleep disturbance  [] Weakness  [x] Numbness  [] Tremors  [] Speech Difficulty  [] Headaches  [] Light Sensitivity  [] Sound Sensitivity   Endocrinology []Excessive thirst  []Excessive hunger   Psychiatric [] Anxiety/Depression  [] Hallucination   Allergy/immunology []Hives/environmental allergies   Hematologic/lymph [] Abnormal bleeding  [] Abnormal bruising     General appearence: Normal. Well groomed.  In no acute distress    Head: Normocephalic, atraumatic  Eyes: Extraocular movements intact, eye lids normal  Lungs: Respirations unlabored, chest wall no deformity  ENT: Normal external ear canals, no sinus tenderness  Heart: Regular rate rhythm  Abdomen: No masses, tenderness  Extremities: No cyanosis or edema, 2+ pulses  Musculoskeletal: Normal range of motion in all joints  Skin: Intact, normal skin color    Neurological examination:    Mental status   Alert and oriented; intact memory with no confusion, speech or language problems; no hallucinations or delusions     Cranial nerves   II - visual fields intact to confrontation III, IV, VI - extra-ocular muscles full: no pupillary defect; no ANALI, no nystagmus, no ptosis   V - normal facial sensation                                                               VII - normal facial symmetry                                                             VIII - intact hearing                                                                             IX, X - symmetrical palate                                                                  XI - symmetrical shoulder shrug                                                       XII - midline tongue without atrophy or fasciculation     Motor function  Normal muscle bulk and tone; normal power 5/5, including fine motor movements     Sensory function Intact to touch, pin, vibration, proprioception     Cerebellar Intact fine motor movement. No involuntary movements or tremors     Reflex function Intact 2+ DTR and symmetric. Negative Babinski     Gait                  Normal station and gait       Lab Results   Component Value Date    LDLCHOLESTEROL 63 04/06/2022     No components found for: CHLPL  Lab Results   Component Value Date    TRIG 66 04/06/2022    TRIG 47 06/01/2021    TRIG 69 06/24/2020     Lab Results   Component Value Date    HDL 49 04/06/2022    HDL 52 06/01/2021    HDL 46 06/24/2020     No results found for: LDLCALC  No results found for: LABVLDL  Lab Results   Component Value Date    LABA1C 5.6 04/06/2022     Lab Results   Component Value Date     04/06/2022     Lab Results   Component Value Date    OPMPSMXF55 121 02/13/2014        Neurological work up:  CK on 8/4/2021 was within normal limits at 139 U/L  Myasthenia Gravis Panel performed on 8/4/2021 was within normal limits at 0.1 nmol/L  Myoglobin performed on 8/4/2021 was within normal limits at 34 ng/mL    MRI Brain W WO Contrast on 02/03/2021: No acute intracranial abnormality. Mild chronic microvascular disease within the periventricular white matter.   MRI Cervical Spine WO Contrast on 02/03/2021: Moderate-sized broad-based right paracentral disc protrusion at C5-6 in mild compression of the cervical cord on the right-side. In addition, moderate central canal stenosis and moderate bilateral foraminal stenosis are identified at this level. Broad disc osteophyte complex at C6-C7 resulting in moderate central canal stenosis. Severe left foraminal stenosis and moderate right foraminal  stenosis are additionally appreciated at this level. Small central disc protrusion at C4-C5 with associated mild right foraminal stenosis. Small central disc protrusion at C3-C4 with associated moderate left foraminal stenosis. Moderate left foraminal stenosis at C2-C3. All of patient's labs were personally reviewed. All the imaging studies were personally reviewed and discussed with the patient. Assessment Recommendations:  Patient with oropharyngeal dysphagia. Possible myasthenia gravis with positive striated muscle antibody titer    Following visitation with a neuromuscular specialist [facility] in [place], the patient tested negative for acetylcholine receptor antibodies, including MUSK antibodies. Patient has also been seen by the [address] [place] [address]. He reports to moderate improvement of dysphagia symptoms but continues to have difficulty swallowing tougher foods. I recommend he continue Pyridostigmine Bromide 60mg/5mL soln TID, and I discussed the possibility of initiating an immunosuppression steroid for continued improvement. Before doing so, I plan to consult medication adjustments with his GI physician. All medication side effects were discussed and questions answered. Patient to follow up in 4-5 months or sooner if symptoms worsen. Jess Parikh MD I would like to thank you for the consult. Please do not hesitate if you have any questions about the patient care.      This note is created with the assistance of a speech-recognition program. While intending to generate a document that actually reflects the content of the visit, the document can still have some errors including those of syntax and sound a- like substitutions which may escape proofreading. In such instances, actual meaning can be extrapolated by contextual derivation. Scribe Attestation:   By signing my name below, Baljinder Hicks, attest that this documentation has been prepared under the direction and in the presence of Yanni Mullen MD.    Electronically Signed: Rosemarie Jack. 09/06/22. 9:22 AM    I, Veronika Tipton MD, personally performed the services described in this documentation. All medical record entries made by the scribe were at my direction and in my presence. I have reviewed the chart and discharge instructions (if applicable) and agree that the record reflects my personal performance and is accurate and complete.     Electronically Signed: Veronika Tipton 9/13/2022 2:34 PM    Diplomate, American Board of Psychiatry and Neurology  Diplomate, American Board of Clinical Neurophysiology  Diplomate, American Board of Epilepsy

## 2022-09-09 ENCOUNTER — OFFICE VISIT (OUTPATIENT)
Dept: PULMONOLOGY | Age: 62
End: 2022-09-09
Payer: COMMERCIAL

## 2022-09-09 VITALS
SYSTOLIC BLOOD PRESSURE: 110 MMHG | BODY MASS INDEX: 34.54 KG/M2 | DIASTOLIC BLOOD PRESSURE: 65 MMHG | WEIGHT: 255 LBS | OXYGEN SATURATION: 100 % | HEIGHT: 72 IN | HEART RATE: 55 BPM | TEMPERATURE: 97.6 F

## 2022-09-09 DIAGNOSIS — G47.33 OSA ON CPAP: Primary | ICD-10-CM

## 2022-09-09 DIAGNOSIS — I10 ESSENTIAL HYPERTENSION: ICD-10-CM

## 2022-09-09 DIAGNOSIS — Z99.89 OSA ON CPAP: Primary | ICD-10-CM

## 2022-09-09 DIAGNOSIS — E66.9 OBESITY (BMI 35.0-39.9 WITHOUT COMORBIDITY): ICD-10-CM

## 2022-09-09 PROCEDURE — 99214 OFFICE O/P EST MOD 30 MIN: CPT | Performed by: INTERNAL MEDICINE

## 2022-09-09 PROCEDURE — 1036F TOBACCO NON-USER: CPT | Performed by: INTERNAL MEDICINE

## 2022-09-09 PROCEDURE — 3017F COLORECTAL CA SCREEN DOC REV: CPT | Performed by: INTERNAL MEDICINE

## 2022-09-09 PROCEDURE — G8427 DOCREV CUR MEDS BY ELIG CLIN: HCPCS | Performed by: INTERNAL MEDICINE

## 2022-09-09 PROCEDURE — G8417 CALC BMI ABV UP PARAM F/U: HCPCS | Performed by: INTERNAL MEDICINE

## 2022-09-09 NOTE — PROGRESS NOTES
Luis Miguel [place]  9/9/2022    Chief Complaint   Patient presents with    Follow-up     6 month follow up-sleep apnea, patient states still using CPAP 6-8 hours nightly. States no changes since his last appointment    Patient is known to have obstructive sleep apnea syndrome which is being treated with CPAP. He using the CPAP very regularly 6 to 8 hours every night. CPAP has been effective in relieving the apnea improving the quality of nighttime sleep no morning headaches no dry mouth. He does feel somewhat sleepy during the daytime subjectively. But he is not fatigued or tired. She might he might take a short nap at times. No pedal edema no thromboembolic process. No nasal stuffiness or sinusitis. He has hypertension no angina no PND. No he is not diabetic. He is trying to lose weight and has been successful he is walking 6 to 7 miles every day. She did not complete the [place] Sleepiness Scale and did not bring any compliance report. Sleep Medicine 3/4/2022 2/22/2021 2/3/2020 8/5/2019 2/4/2019 8/2/2018 5/2/2018   Sitting and reading 2 0 0 0 1 1 0   Watching TV 2 1 2 0 1 0 0   Sitting, inactive in a public place (e.g. a theatre or a meeting) 2 0 0 0 1 0 0   As a passenger in a car for an hour without a break 0 0 0 0 1 1 0   Lying down to rest in the afternoon when circumstances permit 3 2 0 2 2 1 0   Sitting and talking to someone 0 0 0 0 0 0 0   Sitting quietly after a lunch without alcohol 2 1 1 1 1 1 0   In a car, while stopped for a few minutes in traffic 0 0 0 0 0 0 0   [place] Sleepiness Score 11 4 3 3 7 4 0   Neck circumference (Inches) - - - - - - 42         Review of Systems review is assistant was conducted for all other system including upper and lower extremities. No additional information was obtained.   General ROS: negative for - chills, fatigue, fever or weight loss  ENT ROS: negative for - headaches, oral lesions or sore throat  Cardiovascular ROS: no chest pain , orthopnea or pnd   Gastrointestinal ROS: no abdominal pain, change in bowel habits, or black or bloody stools  Skin - no rash   Neuro - no blurry vision , no loc .  No focal weakness   msk - no jt tenderness or swelling    Vascular - no claudication , rest completed and negative   Lymphatic - complete and negative   Hematology - oncology - complete and negative   Allergy immunology - complete and negative    no burning or hematuria       LUNG CANCER SCREENING     CRITERIA MET    []     CT ORDERED  []      CRITERIA NOT MET   [x]      REFUSED                    []      Nonsmoker   REASON CRITERIA NOT MET     SMOKING LESS THAN 30 PY  []      AGE LESS THAN 55 or GREATER 77 YEARS  []      QUIT SMOKING 15 YEARS OR GREATER   []      RECENT CT WITH IN 11 MONTHS    []      LIFE EXPECTANCY < 5 YEARS   []      SIGNS  AND SYMPTOMS OF LUNG CANCER   []           Immunization   Immunization History   Administered Date(s) Administered    COVID-19, MODERNA BLUE border, Primary or Immunocompromised, (age 12y+), IM, 100 mcg/0.5mL 02/27/2021, 03/20/2021    COVID-19, PFIZER GRAY top, DO NOT Dilute, (age 15 y+), IM, 30 mcg/0.3 mL 07/15/2022    COVID-19, PFIZER PURPLE top, DILUTE for use, (age 15 y+), 30mcg/0.3mL 10/23/2021    Influenza A ([id number]) Vaccine PF IM 11/18/2009    Influenza Virus Vaccine 10/14/2014, 12/18/2015, 12/21/2016, 12/21/2017, 11/19/2018    Influenza, AFLURIA (age 1 yrs+), FLUZONE, (age 10 mo+), MDV, 0.5mL 12/21/2016, 12/21/2017, 11/19/2018, 10/04/2019    Influenza, FLUAD, (age 72 y+), Adjuvanted, 0.5mL 11/30/2020    Influenza, FLUCELVAX, (age 10 mo+), MDCK, PF, 0.5mL 09/07/2021    Pneumococcal Conjugate 13-valent (Sljucpr96) 11/19/2018    Pneumococcal Polysaccharide ([id number]) 12/19/2019    Tdap (Boostrix, Adacel) 06/26/2019        Pneumococcal Vaccine     [x] Up to date    [] Indicated   [] Refused  [] Contraindicated       Influenza Vaccine   [x] Up to date    [] Indicated   [] Refused  [] Contraindicated     He already had a COVID-19 vaccine. PAST MEDICAL HISTORY:         Diagnosis Date    Arthritis     neck    BPH (benign prostatic hyperplasia)     Class 2 severe obesity due to excess calories with serious comorbidity in adult [facility])     Obesity (BMI 30.0-34. 9) [E66.9]    Dysphagia     Erectile dysfunction     Full dentures     GERD (gastroesophageal reflux disease)     Hiatal hernia     Hyperlipidemia     Hypertension     YUMIKO on CPAP     PONV (postoperative nausea and vomiting)     nauseated after last surgery    Unspecified sleep apnea     cpap nightly       Family History:       Problem Relation Age of Onset    Heart Disease Mother        SURGICAL HISTORY:   Past Surgical History:   Procedure Laterality Date    COLONOSCOPY      ENDOSCOPY, COLON, DIAGNOSTIC      ESOPHAGEAL MOTILITY STUDY N/A 3/17/2021    ESOPHAGEAL MOTILITY STUDY performed by Shayne Quijano MD at [address] N/A 7/16/2021    CYSTO  FULGURATION  EXCISION PENILE WARTS performed by Damon Candelaria MD at [address] N/A 7/31/2018    SUPRA PUBIC LESIONS BIOPSY EXCISION performed by Damon Candelaria MD at [address] Bilateral 07/31/2018    SUPRA PUBIC LESIONS BIOPSY EXCISION (N/A )    THROAT SURGERY  2008    TONSILLECTOMY      TUMOR REMOVAL Right 09/23/2016    [facility]  right thigh   (benign)    UPPER GASTROINTESTINAL ENDOSCOPY N/A 12/21/2020    EGD BIOPSY performed by Shayne Quijano MD at [facility]              Not in a hospital admission. Allergies   Allergen Reactions    Pcn [Penicillins]      Patient states he was told he had allergy as a child. Has taken penicillin related medications in past with no problems. Social History     Tobacco Use   Smoking Status Never   Smokeless Tobacco Never     Prior to Admission medications    Medication Sig Start Date End Date Taking? Authorizing Provider   omeprazole (PRILOSEC) 20 MG delayed release capsule TAKE ONE CAPSULE BY MOUTH DAILY.  TAKE 45 MINUTES PRIOR TO intolerance    Throat examination is unremarkable. Ear examination is normal.    Eyes do not reveal any jaundice or Elías's syndrome                :    Neck:   Supple, symmetrical, trachea midline, no adenopathy;     thyroid:  no enlargement/tenderness/nodules; no carotid    bruit or JVD   Back:     Symmetric, no curvature, ROM normal, no CVA tenderness   Lungs:      AP diameter is not increased. Percussion note is resonant breathing vesicular. Expiration is not prolonged. No rales, rhonchi are audible. No pleural friction rub is audible. Chest Wall:    No tenderness or deformity      Heart:    Regular rate and rhythm, S1 and S2 normal, no murmur, rub        or gallop no rvh                           Abdomen:                                                 Pulses:                                            Lymph nodes:                    Neurologic:                  Soft, non-tender, bowel sounds active all four quadrants,     no masses, no organomegaly         2+ and symmetric all extremities            Cervical, supraclavicular not enlarged or matted or tender      CNII-XII intact, normal strength 5/5 . Sensation grossly normal  and reflexes normal 2+  throughout     Clubbing No  Lower ext edema No1+   [] , 2 +  [] , 3+   []  Upper ext edema No       Musculoskeletal - no joint swelling or tenderness or synovitis               /65   Pulse 55   Temp 97.6 °F (36.4 °C)   Ht 6' (1.829 m)   Wt 255 lb (115.7 kg)   SpO2 100%   BMI 34.58 kg/m²     CXR  No recent chest x-ray      CT Scans  Syndrome. CT scan    Echo  No echocardiogram        Assessment  Sleep apnea syndrome   systemic hypertension   obesity   benign prostatic hypertrophy  Acid reflux  Plan:  Patient sleep apnea responding well to the use of CPAP. He will continue use of CPAP as before.     Continue to follow sleep hygiene instructions    Continue the use of humidification    I encouraged him to continue to participate in the exercise program to lose more weight. His acid reflux is under good control he did not denies any symptoms of benign prostatic prostatic hypertrophy. His blood pressure is under good control. He already have her COVID-vaccine. I advised him to take the booster again this month.     I also advised him to get the flu vaccine    I plan to see in follow-up in few months    Dictated with Dr. Mian Mims MD dictation over thank you

## 2022-10-20 DIAGNOSIS — G70.00 MYASTHENIA GRAVIS (HCC): ICD-10-CM

## 2022-10-21 NOTE — TELEPHONE ENCOUNTER
Pharmacy requesting refill of Pyridostigmine Bromide 60 MG/5ML SOLN    Medication active on med list yes      Date of last Rx: 05/04/2022 with 5 refills          verified by MARY BETH RAMÍREZ      Date of last appointment 09/06/2022    Next Visit Date:  Visit date not found

## 2022-10-24 RX ORDER — PYRIDOSTIGMINE BROMIDE 60 MG/5ML
SOLUTION ORAL
Qty: 450 ML | Refills: 11 | Status: SHIPPED | OUTPATIENT
Start: 2022-10-24

## 2022-11-01 ENCOUNTER — TELEPHONE (OUTPATIENT)
Dept: NEUROLOGY | Age: 62
End: 2022-11-01

## 2022-11-01 NOTE — TELEPHONE ENCOUNTER
Patient having trouble getting his prescription of pyridostigmine Bromide 60 mg/5 ml soln. [facility] has asked for a new prescription written for 90 days instead of 30. [facility] is going to send out a 30 day supply of the medication one time. Writer tried to get a more specific time when the medication was being shipped out but [facility] could not provide that just that it is in a holding pattern to be shipped out. Patient was notified.

## 2022-11-04 NOTE — TELEPHONE ENCOUNTER
I called Mr. Marybeth Wagner. He has enough medicine now and he said he got an email form [facility]. that it is on the way. I told him if he gets his shipment and it is only for a 30 day supply to call us back so we can contact [facility] and get it corrected for a 90d supply. He voiced understanding.

## 2022-11-16 ENCOUNTER — OFFICE VISIT (OUTPATIENT)
Dept: GASTROENTEROLOGY | Age: 62
End: 2022-11-16
Payer: COMMERCIAL

## 2022-11-16 VITALS
SYSTOLIC BLOOD PRESSURE: 114 MMHG | BODY MASS INDEX: 33.59 KG/M2 | WEIGHT: 248 LBS | HEIGHT: 72 IN | DIASTOLIC BLOOD PRESSURE: 65 MMHG

## 2022-11-16 DIAGNOSIS — R13.10 DYSPHAGIA, UNSPECIFIED TYPE: Primary | ICD-10-CM

## 2022-11-16 DIAGNOSIS — A04.8 H. PYLORI INFECTION: ICD-10-CM

## 2022-11-16 PROCEDURE — G8484 FLU IMMUNIZE NO ADMIN: HCPCS | Performed by: INTERNAL MEDICINE

## 2022-11-16 PROCEDURE — 3017F COLORECTAL CA SCREEN DOC REV: CPT | Performed by: INTERNAL MEDICINE

## 2022-11-16 PROCEDURE — 99213 OFFICE O/P EST LOW 20 MIN: CPT | Performed by: INTERNAL MEDICINE

## 2022-11-16 PROCEDURE — 3078F DIAST BP <80 MM HG: CPT | Performed by: INTERNAL MEDICINE

## 2022-11-16 PROCEDURE — G8427 DOCREV CUR MEDS BY ELIG CLIN: HCPCS | Performed by: INTERNAL MEDICINE

## 2022-11-16 PROCEDURE — 3074F SYST BP LT 130 MM HG: CPT | Performed by: INTERNAL MEDICINE

## 2022-11-16 PROCEDURE — 1036F TOBACCO NON-USER: CPT | Performed by: INTERNAL MEDICINE

## 2022-11-16 PROCEDURE — G8417 CALC BMI ABV UP PARAM F/U: HCPCS | Performed by: INTERNAL MEDICINE

## 2022-11-16 RX ORDER — OMEPRAZOLE 40 MG/1
40 CAPSULE, DELAYED RELEASE ORAL
Qty: 90 CAPSULE | Refills: 1 | Status: SHIPPED | OUTPATIENT
Start: 2022-11-16

## 2022-11-16 RX ORDER — OMEPRAZOLE 20 MG/1
20 CAPSULE, DELAYED RELEASE ORAL NIGHTLY
Qty: 90 CAPSULE | Refills: 2 | Status: SHIPPED | OUTPATIENT
Start: 2022-11-16

## 2022-11-16 ASSESSMENT — ENCOUNTER SYMPTOMS
WHEEZING: 0
BLOOD IN STOOL: 0
CONSTIPATION: 0
ANAL BLEEDING: 0
SORE THROAT: 0
RECTAL PAIN: 0
NAUSEA: 0
APNEA: 0
VOMITING: 0
RESPIRATORY NEGATIVE: 1
COLOR CHANGE: 0
GASTROINTESTINAL NEGATIVE: 1
CHOKING: 0
DIARRHEA: 0
VOICE CHANGE: 0
ABDOMINAL DISTENTION: 0
SHORTNESS OF BREATH: 0
ABDOMINAL PAIN: 0
TROUBLE SWALLOWING: 1
COUGH: 0

## 2022-11-16 NOTE — PROGRESS NOTES
GI FOLLOW UP    INTERVAL HISTORY:       Subacute progression of the patient's oropharyngeal dysphagia and dysphonia that appears to be neuromuscular in nature. Ongoing work-up for myasthenia gravis and medication adjustments being discussed    Chief Complaint   Patient presents with    Follow-up    Gastroesophageal Reflux       1. Dysphagia, unspecified type    2. H. pylori infection          HISTORY OF PRESENT ILLNESS: Aydin Espino is a 59 y.o. 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  Patient reports that he had scrambled eggs and toast and since then he has been having difficulty swallowing localized to the oropharyngeal area. Denies any globus sensation. Denies any regurgitation or chest symptoms. No other GI symptoms. Patient reports that he is able to tolerate liquids with more thicker consistency currently which is improvement from previous. Has yet to try solid food due to fear of dysphagia. Denies any neurological symptoms that might suggest a possible TIA or CVA. 2007--throat surgery-- resection of benign growth around vocal cords. Smoker: none   Drinking history: 10 months quit, social  Abdominal surgeries: none   Prior Colonoscopy: 2017--next in 10 yrs  Prior EGD: 2010 dysphagia symptoms at that time  FH of GI issues: none    Past Medical,Family, and Social History reviewed and does contribute to the patient presenting condition. Patient's PMH/PSH,SH,PSYCH Hx, MEDs, ALLERGIES, and ROS were all reviewed and updated in the appropriate sections.     PAST MEDICAL HISTORY:  Past Medical History:   Diagnosis Date    Arthritis     neck    BPH (benign prostatic hyperplasia)     Class 2 severe obesity due to excess calories with serious comorbidity in adult [facility])     Obesity (BMI 30.0-34. 9) [E66.9]    Dysphagia     Erectile dysfunction     Full dentures     GERD (gastroesophageal reflux disease)     Hiatal hernia     Hyperlipidemia     Hypertension     YUMIKO on CPAP     PONV (postoperative nausea and vomiting)     nauseated after last surgery    Unspecified sleep apnea     cpap nightly       Past Surgical History:   Procedure Laterality Date    COLONOSCOPY      ENDOSCOPY, COLON, DIAGNOSTIC      ESOPHAGEAL MOTILITY STUDY N/A 3/17/2021    ESOPHAGEAL MOTILITY STUDY performed by Vik Atwood MD at [address] N/A 7/16/2021    CYSTO  FULGURATION  EXCISION PENILE WARTS performed by Jason Love MD at [address] N/A 7/31/2018    SUPRA PUBIC LESIONS BIOPSY EXCISION performed by Jason Love MD at [address] Bilateral 07/31/2018    SUPRA PUBIC LESIONS BIOPSY EXCISION (N/A )    THROAT SURGERY  2008    TONSILLECTOMY      TUMOR REMOVAL Right 09/23/2016    [facility]  right thigh   (benign)    UPPER GASTROINTESTINAL ENDOSCOPY N/A 12/21/2020    EGD BIOPSY performed by Vik Atwood MD at [address]:    Current Outpatient Medications:     omeprazole (PRILOSEC) 40 MG delayed release capsule, Take 1 capsule by mouth every morning (before breakfast), Disp: 90 capsule, Rfl: 1    omeprazole (PRILOSEC) 20 MG delayed release capsule, Take 1 capsule by mouth nightly, Disp: 90 capsule, Rfl: 2    Pyridostigmine Bromide 60 MG/5ML SOLN, TAKE ONE TEASPOONFUL (5 ML) BY MOUTH THREE TIMES A DAY, Disp: 450 mL, Rfl: 11    omeprazole (PRILOSEC) 20 MG delayed release capsule, TAKE ONE CAPSULE BY MOUTH DAILY.  TAKE 45 MINUTES PRIOR TO ALL OTHER MEDICATIONS., Disp: 90 capsule, Rfl: 1    omeprazole (PRILOSEC OTC) 20 MG tablet, Take 1 tablet by mouth every evening, Disp: 30 tablet, Rfl: 3    omeprazole (PRILOSEC OTC) 20 MG tablet, Take 1 tablet by mouth daily, Disp: 30 tablet, Rfl: 3    pravastatin (PRAVACHOL) 40 MG tablet, TAKE ONE TABLET BY MOUTH EVERY DAY, Disp: 90 tablet, Rfl: 1    valsartan-hydroCHLOROthiazide (DIOVAN-HCT) 160-12.5 MG per tablet, TAKE ONE TABLET BY MOUTH EVERY DAY, Disp: 90 tablet, Rfl: 1    amLODIPine (NORVASC) 10 MG tablet, Take 1 tablet by mouth in the morning., Disp: 90 tablet, Rfl: 1    loratadine (CLARITIN) 10 MG tablet, TAKE ONE TABLET BY MOUTH EVERY DAY, Disp: 90 tablet, Rfl: 1    fluticasone (FLONASE) 50 MCG/ACT nasal spray, 1 spray by Each Nostril route daily (Patient taking differently: 1 spray by Each Nostril route daily Indications: Patient using prn), Disp: 2 Bottle, Rfl: 1    azelastine (ASTELIN) 0.1 % nasal spray, 1 spray by Nasal route 2 times daily Use in each nostril as directed (Patient taking differently: 1 spray by Nasal route 2 times daily Indications: Patient using PRN Use in each nostril as directed), Disp: 2 Bottle, Rfl: 1    tamsulosin (FLOMAX) 0.4 MG capsule, Take 0.4 mg by mouth daily, Disp: , Rfl:     potassium chloride (KLOR-CON M) 20 MEQ extended release tablet, TAKE ONE TABLET BY MOUTH EVERY DAY (Patient not taking: Reported on 11/16/2022), Disp: 90 tablet, Rfl: 1    spironolactone (ALDACTONE) 50 MG tablet, TAKE ONE TABLET BY MOUTH EVERY DAY (Patient not taking: No sig reported), Disp: 90 tablet, Rfl: 0    ALLERGIES:   Allergies   Allergen Reactions    Pcn [Penicillins]      Patient states he was told he had allergy as a child. Has taken penicillin related medications in past with no problems.        FAMILY HISTORY:       Problem Relation Age of Onset    Heart Disease Mother          SOCIAL HISTORY:   Social History     Socioeconomic History    Marital status:      Spouse name: Not on file    Number of children: Not on file    Years of education: Not on file    Highest education level: Not on file   Occupational History    Not on file   Tobacco Use    Smoking status: Never    Smokeless tobacco: Never   Vaping Use    Vaping Use: Never used   Substance and Sexual Activity    Alcohol use: Not Currently    Drug use: No    Sexual activity: Not on file   Other Topics Concern    Not on file   Social History Narrative    Not on file     Social Determinants of Health     Financial Resource Strain: Low Risk     Difficulty of Paying Living Expenses: Not hard at all   Food Insecurity: No Food Insecurity    Worried About Running Out of Food in the Last Year: Never true    Ran Out of Food in the Last Year: Never true   Transportation Needs: Not on file   Physical Activity: Not on file   Stress: Not on file   Social Connections: Not on file   Intimate Partner Violence: Not on file   Housing Stability: Not on file       REVIEW OF SYSTEMS: A 12-point review of systems was obtained and pertinent positives and negatives were listed below. REVIEW OF SYSTEMS:     Constitutional: No fever, no chills, no lethargy, no weakness. HEENT:  No headache, otalgia, itchy eyes, nasal discharge or sore throat. Cardiac:  No chest pain, dyspnea, orthopnea or PND. Chest:   No cough, phlegm or wheezing. Abdomen:      Detailed by MA   Neuro:  No focal weakness, abnormal movements or seizure like activity. Skin:   No rashes, no itching. :   No hematuria, no pyuria, no dysuria, no flank pain. Extremities:  No swelling or joint pains. ROS was otherwise negative    Review of Systems   Constitutional: Negative. Negative for appetite change, fatigue, fever and unexpected weight change. HENT:  Positive for trouble swallowing. Negative for sore throat and voice change. Eyes:  Positive for visual disturbance (wears glasses). Respiratory: Negative. Negative for apnea, cough, choking, shortness of breath and wheezing. Cardiovascular: Negative. Negative for chest pain, palpitations and leg swelling.    Gastrointestinal: Negative. Negative for abdominal distention, abdominal pain, anal bleeding, blood in stool, constipation, diarrhea, nausea, rectal pain and vomiting. Genitourinary: Negative. Negative for difficulty urinating. Skin: Negative. Negative for color change and rash. Neurological: Negative. Negative for dizziness, seizures, weakness, light-headedness, numbness and headaches. Hematological: Negative. Does not bruise/bleed easily. Psychiatric/Behavioral: Negative. Negative for confusion and sleep disturbance. The patient is not nervous/anxious. PHYSICAL EXAMINATION: Vital signs reviewed per the nursing documentation. /65 (Site: Left Upper Arm, Position: Sitting)   Ht 6' (1.829 m)   Wt 248 lb (112.5 kg)   PF 63 L/min   BMI 33.63 kg/m²   Body mass index is 33.63 kg/m². Physical Exam    Physical Exam   Constitutional: Patient is oriented to person, place, and time. Patient appears well-developed and well-nourished. HENT:   Head: Normocephalic and atraumatic. Eyes: Pupils are equal, round, and reactive to light. EOM are normal.   Neck: Normal range of motion. Neck supple. No JVD present. No tracheal deviation present. No thyromegaly present. Cardiovascular: Normal rate, regular rhythm, normal heart sounds and intact distal pulses. Pulmonary/Chest: Effort normal and breath sounds normal. No stridor. No respiratory distress. He has no wheezes. He has no rales. He exhibits no tenderness. Abdominal: Soft. Bowel sounds are normal. He exhibits no distension and no mass. There is no tenderness. There is no rebound and no guarding. No hernia. Musculoskeletal: Normal range of motion. Lymphadenopathy:    Patient has no cervical adenopathy. Neurological: Patient is alert and oriented to person, place, and time. Psychiatric: Patient has a normal mood and affect.  Patient behavior is normal.       LABORATORY DATA: Reviewed  Lab Results   Component Value Date    WBC 5.1 04/06/2022    HGB 13.6 04/06/2022    HCT 41.8 04/06/2022    MCV 86.5 04/06/2022     04/06/2022     04/06/2022    K 3.8 04/06/2022     04/06/2022    CO2 28 04/06/2022    BUN 12 04/06/2022    CREATININE 1.03 04/06/2022    LABPROT 7.9 05/09/2012    LABALBU 4.6 04/06/2022    BILITOT 0.94 04/06/2022    ALKPHOS 85 04/06/2022    AST 21 04/06/2022    ALT 23 04/06/2022         Lab Results   Component Value Date    RBC 4.83 04/06/2022    HGB 13.6 04/06/2022    MCV 86.5 04/06/2022    MCH 28.2 04/06/2022    MCHC 32.5 04/06/2022    RDW 13.7 04/06/2022    MPV 9.5 04/06/2022    BASOPCT 1 07/20/2018    LYMPHSABS 1.60 07/20/2018    MONOSABS 0.40 07/20/2018    NEUTROABS 3.00 07/20/2018    EOSABS 0.00 07/20/2018    BASOSABS 0.00 07/20/2018         DIAGNOSTIC TESTING:     No results found. IMPRESSION: Mr.Ernest Dorian Shannon is a 56 y.o. 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  Patient reports that he had scrambled eggs and toast and since then he has been having difficulty swallowing localized to the oropharyngeal area. Denies any globus sensation. Denies any regurgitation or chest symptoms. No other GI symptoms. Patient reports that he is able to tolerate liquids with more thicker consistency currently which is improvement from previous. Has yet to try solid food due to fear of dysphagia. Denies any neurological symptoms that might suggest a possible TIA or CVA.         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-eradicated with negative breath test  Continues to have difficulty swallowing with solid food only-no structural causes identified during upper endoscopy and upper GI/esophagram  Esophageal biopsies were negative  Reports swallowing-\" fatigue \"  Unable to initiate swallowing during fatigue episodes  Was referred to neurology-myasthenia gravis antibodies appear to be favoring diagnosis        [facility]            Assessment  1. Dysphagia, unspecified type    2. H. pylori infection        Yudith Salter was seen today for follow-up. Diagnoses and all orders for this visit:    Gastroesophageal reflux disease without esophagitis-patient continue with Prilosec 20 mg every evening. Symptoms appear to well-controlled    H. pylori infection-treated and eradicated with negative breath test.    Change in voice-presumably related to the patient's previous presumed diagnosis of myasthenia gravis, ongoing work-up by neurology. Responded well with pyridostigmine however developing tolerance, ongoing follow-up with neurology. Dysphagia, unspecified type-neuromuscular nature, had empiric dilation at [facility] clinic for Schatzki's ring. We will increase the patient's PPI therapy to twice daily. Monitor symptoms. The patient may require evaluation and functional GI clinic, will provide referral to [facility]. Other orders  -     omeprazole (PRILOSEC OTC) 20 MG tablet; Take 1 tablet by mouth every evening         RTC: 3 months    Additional comments: Thank you for allowing me to participate in the care of Mr. Kehinde Gutierrez. For any further questions please do not hesitate to contact me. I have reviewed and agree with the ROS entered by the MA/LPN from today's encounter documented in a separate note.         Johnathan Chang MD, MPH   Board Certified in Gastroenterology  Board Certified in [address] [place] [address] #: [phone]    this note is created with the assistance of a speech recognition program.  While intending to generate a document that actually reflects the content of the visit, the document can still have some errors including those of syntax and sound a like substitutions which may escape proof reading. It such instances, actual meaning can be extrapolated by contextual diversion.

## 2022-12-05 ENCOUNTER — TELEPHONE (OUTPATIENT)
Dept: INTERNAL MEDICINE CLINIC | Age: 62
End: 2022-12-05

## 2022-12-05 DIAGNOSIS — J06.9 URI, ACUTE: Primary | ICD-10-CM

## 2022-12-05 NOTE — TELEPHONE ENCOUNTER
NORMAL     • Experiences normal transition Progressing    • Total weight loss less than 10% of birth weight Progressing        Patient Centered Care    • Patient preferences are identified and integrated in the patient's plan of care Progressing Patient notifed  He is allergic to pcn, per Dr Pawel Werner change to zpack    Left with pharmacist florence    Please sign order

## 2022-12-05 NOTE — TELEPHONE ENCOUNTER
Amoxicillin 500 mg p.o. 3 times daily for 7 days  Claritin 10 mg p.o. daily #30  Over-the-counter Tylenol, push more oral fluid, salt water gargle  If patient is unvaccinated against COVID, advised to go to urgent care

## 2022-12-05 NOTE — TELEPHONE ENCOUNTER
----- Message from April Villafuerte sent at 12/5/2022  8:08 AM EST -----  Subject: Message to Provider    QUESTIONS  Information for Provider? PT wife, Chrissy Roldan called and cancelled the PT   appointment this morning with PCP, Juventino Childress due to PT being sick with   a slight elevated temp, cold and cough. PT wife, Chrissy Roldan is wondering if   the PCP will call something in to help with the PT cold symptoms to the   pharmacy, [address] in [place] at [facility]. Please reach out   to the PT to discuss   ---------------------------------------------------------------------------  --------------  1966 [employer]  [phone]; OK to leave message on voicemail  ---------------------------------------------------------------------------  --------------  SCRIPT ANSWERS  Relationship to Patient? Other  Representative Name? Chrissy Roldan   Is the Representative on the appropriate HIPAA document in Epic?  Yes

## 2022-12-06 RX ORDER — LORATADINE 10 MG/1
10 TABLET ORAL DAILY
Qty: 30 TABLET | Refills: 0 | OUTPATIENT
Start: 2022-12-06

## 2022-12-06 RX ORDER — AZITHROMYCIN 250 MG/1
250 TABLET, FILM COATED ORAL SEE ADMIN INSTRUCTIONS
Qty: 6 TABLET | Refills: 0 | OUTPATIENT
Start: 2022-12-06 | End: 2022-12-11

## 2022-12-15 ENCOUNTER — TELEPHONE (OUTPATIENT)
Dept: FAMILY MEDICINE CLINIC | Age: 62
End: 2022-12-15

## 2022-12-15 NOTE — TELEPHONE ENCOUNTER
----- Message from Lukas Martin sent at 12/12/2022 10:35 AM EST -----  Subject: Message to Provider    QUESTIONS  Information for Provider? Patient wife would like to know if the Dr could   call in something for a cough. Patient had eye surgery not too long ago   and was told no to put any stress on it. Wife states that all OTC cough   medication is sold out. Please call if needed. ---------------------------------------------------------------------------  --------------  Mono CHAO  [phone]; OK to leave message on voicemail  ---------------------------------------------------------------------------  --------------  SCRIPT ANSWERS  Relationship to Patient? Other  Representative Name? Eamon Eubanks  Is the Representative on the appropriate HIPAA document in Epic?  Yes

## 2022-12-19 RX ORDER — GUAIFENESIN/DEXTROMETHORPHAN 100-10MG/5
5 SYRUP ORAL 3 TIMES DAILY PRN
Qty: 120 ML | Refills: 0 | Status: SHIPPED | OUTPATIENT
Start: 2022-12-19 | End: 2022-12-29

## 2022-12-19 RX ORDER — GUAIFENESIN AND CODEINE PHOSPHATE 100; 10 MG/5ML; MG/5ML
5 SOLUTION ORAL 3 TIMES DAILY PRN
Qty: 473 ML | Refills: 0 | Status: CANCELLED | OUTPATIENT
Start: 2022-12-19 | End: 2022-12-26

## 2023-01-12 ENCOUNTER — OFFICE VISIT (OUTPATIENT)
Dept: INTERNAL MEDICINE CLINIC | Age: 63
End: 2023-01-12

## 2023-01-12 VITALS
BODY MASS INDEX: 30.75 KG/M2 | SYSTOLIC BLOOD PRESSURE: 118 MMHG | TEMPERATURE: 96.9 F | RESPIRATION RATE: 16 BRPM | HEIGHT: 72 IN | HEART RATE: 62 BPM | WEIGHT: 227 LBS | OXYGEN SATURATION: 99 % | DIASTOLIC BLOOD PRESSURE: 68 MMHG

## 2023-01-12 DIAGNOSIS — F45.8 ANXIETY HYPERVENTILATION: ICD-10-CM

## 2023-01-12 DIAGNOSIS — Z98.890 HISTORY OF ESOPHAGEAL DILATATION: ICD-10-CM

## 2023-01-12 DIAGNOSIS — N18.30 STAGE 3 CHRONIC KIDNEY DISEASE, UNSPECIFIED WHETHER STAGE 3A OR 3B CKD (HCC): Primary | ICD-10-CM

## 2023-01-12 DIAGNOSIS — D36.7 BENIGN NEOPLASM OF LOWER EXTREMITY: ICD-10-CM

## 2023-01-12 DIAGNOSIS — M67.919 TENDINOPATHY OF ROTATOR CUFF, UNSPECIFIED LATERALITY: ICD-10-CM

## 2023-01-12 DIAGNOSIS — I10 ESSENTIAL HYPERTENSION: ICD-10-CM

## 2023-01-12 DIAGNOSIS — Z99.89 OSA ON CPAP: ICD-10-CM

## 2023-01-12 DIAGNOSIS — N40.0 BENIGN PROSTATIC HYPERPLASIA WITHOUT LOWER URINARY TRACT SYMPTOMS: ICD-10-CM

## 2023-01-12 DIAGNOSIS — N52.01 ERECTILE DYSFUNCTION DUE TO ARTERIAL INSUFFICIENCY: ICD-10-CM

## 2023-01-12 DIAGNOSIS — E78.2 MIXED HYPERLIPIDEMIA: ICD-10-CM

## 2023-01-12 DIAGNOSIS — M50.90 DISORDER OF INTERVERTEBRAL DISC OF CERVICAL SPINE: ICD-10-CM

## 2023-01-12 DIAGNOSIS — Z23 NEED FOR INFLUENZA VACCINATION: ICD-10-CM

## 2023-01-12 DIAGNOSIS — G47.33 OSA ON CPAP: ICD-10-CM

## 2023-01-12 DIAGNOSIS — G70.00 MYASTHENIA GRAVIS (HCC): ICD-10-CM

## 2023-01-12 DIAGNOSIS — K21.9 GASTROESOPHAGEAL REFLUX DISEASE WITHOUT ESOPHAGITIS: ICD-10-CM

## 2023-01-12 DIAGNOSIS — F41.9 ANXIETY HYPERVENTILATION: ICD-10-CM

## 2023-01-12 DIAGNOSIS — R13.12 OROPHARYNGEAL DYSPHAGIA: ICD-10-CM

## 2023-01-12 DIAGNOSIS — J30.89 SEASONAL ALLERGIC RHINITIS DUE TO OTHER ALLERGIC TRIGGER: ICD-10-CM

## 2023-01-12 ASSESSMENT — PATIENT HEALTH QUESTIONNAIRE - PHQ9
SUM OF ALL RESPONSES TO PHQ QUESTIONS 1-9: 0
SUM OF ALL RESPONSES TO PHQ9 QUESTIONS 1 & 2: 0
SUM OF ALL RESPONSES TO PHQ QUESTIONS 1-9: 0
1. LITTLE INTEREST OR PLEASURE IN DOING THINGS: 0
2. FEELING DOWN, DEPRESSED OR HOPELESS: 0
SUM OF ALL RESPONSES TO PHQ QUESTIONS 1-9: 0
SUM OF ALL RESPONSES TO PHQ QUESTIONS 1-9: 0

## 2023-01-12 ASSESSMENT — ENCOUNTER SYMPTOMS
RESPIRATORY NEGATIVE: 1
ALLERGIC/IMMUNOLOGIC NEGATIVE: 1
EYES NEGATIVE: 1
BACK PAIN: 1
GASTROINTESTINAL NEGATIVE: 1

## 2023-01-12 NOTE — PROGRESS NOTES
Subjective:      Patient ID: Michael Mason is a 57 y.o. male. Hypertension  This is a chronic problem. The current episode started more than 1 month ago. The problem has been gradually improving since onset. The problem is controlled. Associated symptoms include anxiety. Risk factors for coronary artery disease include male gender. Past treatments include calcium channel blockers, angiotensin blockers and diuretics. The current treatment provides moderate improvement. There are no compliance problems. Hypertensive end-organ damage includes kidney disease. Identifiable causes of hypertension include chronic renal disease and sleep apnea. Review of Systems   Constitutional: Negative. HENT: Negative. Eyes: Negative. Respiratory: Negative. Cardiovascular: Negative. Gastrointestinal: Negative. Endocrine: Negative. Musculoskeletal:  Positive for arthralgias and back pain. Skin: Negative. Allergic/Immunologic: Negative. Neurological: Negative. Hematological: Negative. Psychiatric/Behavioral:  The patient is nervous/anxious. Past family and social history unremarkable. Diagnosis Orders   1. Stage 3 chronic kidney disease, unspecified whether stage 3a or 3b CKD ([facility] [address]        2. Need for influenza vaccination  Influenza, FLUCELVAX, (age 10 mo+), IM, PF, 0.5 mL      3. Benign prostatic hyperplasia without lower urinary tract symptoms        4. Erectile dysfunction due to arterial insufficiency        5. Gastroesophageal reflux disease without esophagitis        6. Tendinopathy of rotator cuff, unspecified laterality        7. YUMIKO on CPAP        8. Anxiety hyperventilation        9. Benign neoplasm of lower extremity        10. Seasonal allergic rhinitis due to other allergic trigger        11. Oropharyngeal dysphagia        12. Disorder of intervertebral disc of cervical spine        13. Myasthenia gravis ([facility] [address]        14. Essential hypertension        15.  Mixed hyperlipidemia 16. History of esophageal dilatation            Objective:   Physical Exam  Vitals and nursing note reviewed. Constitutional:       Appearance: He is well-developed. HENT:      Head: Normocephalic and atraumatic. Right Ear: External ear normal.      Left Ear: External ear normal.      Nose: Nose normal.      Comments: Allergies, allergic rhinitis  Eyes:      Conjunctiva/sclera: Conjunctivae normal.      Pupils: Pupils are equal, round, and reactive to light. Comments: Myasthenia gravis on pyridostigmine  Status post recent cataract surgery on the left. Blepharitis. He is established with ophthalmology   Cardiovascular:      Rate and Rhythm: Normal rate and regular rhythm. Heart sounds: Normal heart sounds. Comments: Hypertension  Hyperlipidemia  Pulmonary:      Effort: Pulmonary effort is normal.      Breath sounds: Normal breath sounds. Abdominal:      General: Bowel sounds are normal.      Palpations: Abdomen is soft. Comments: GERD   Musculoskeletal:         General: Normal range of motion. Cervical back: Normal range of motion and neck supple. Skin:     General: Skin is warm and dry. Neurological:      Mental Status: He is alert and oriented to person, place, and time. Deep Tendon Reflexes: Reflexes are normal and symmetric. Psychiatric:         Behavior: Behavior normal.         Thought Content: Thought content normal.       Assessment:       Diagnosis Orders   1. Stage 3 chronic kidney disease, unspecified whether stage 3a or 3b CKD ([facility] [address]        2. Need for influenza vaccination  Influenza, FLUCELVAX, (age 10 mo+), IM, PF, 0.5 mL      3. Benign prostatic hyperplasia without lower urinary tract symptoms        4. Erectile dysfunction due to arterial insufficiency        5. Gastroesophageal reflux disease without esophagitis        6. Tendinopathy of rotator cuff, unspecified laterality        7. YUMIKO on CPAP        8. Anxiety hyperventilation        9.  Benign neoplasm of lower extremity        10. Seasonal allergic rhinitis due to other allergic trigger        11. Oropharyngeal dysphagia        12. Disorder of intervertebral disc of cervical spine        13. Myasthenia gravis ([address]        14. Essential hypertension        15. Mixed hyperlipidemia        16. History of esophageal dilatation                Plan:      68-year-old -American male status post left cataract surgery followed by eyelid infection that appears to be improving. He denies pain or blurred vision. Advised to follow closely with ophthalmology  Hypertension well-controlled to calcium channel blocker, ARB, diuretic. Consume less than 2 g of salt a day  Hyperlipidemia on statin that he is tolerating well  GERD stable on proton pump inhibitor  Allergies allergic rhinitis. Continue Flonase, nasal saline antihistamine as needed  Myasthenia gravis on Mestinon. Med list and available labs reviewed, discussed with patient, questions answered  Is encouraged to call for any concern  History of esophageal stenosis. He is asymptomatic. History of esophageal dilatation. This note is created with a voice recognition program and while intend to generate a document that accurately reflects the content of the visit, no guarantee can be provided that every mistake has been identified and corrected by editing.           Angie Cortez MD

## 2023-01-24 DIAGNOSIS — I10 ESSENTIAL HYPERTENSION: ICD-10-CM

## 2023-01-24 RX ORDER — AMLODIPINE BESYLATE 10 MG/1
TABLET ORAL
Qty: 90 TABLET | Refills: 0 | Status: SHIPPED | OUTPATIENT
Start: 2023-01-24

## 2023-01-24 NOTE — TELEPHONE ENCOUNTER
Glenn Oviedo is calling to request a refill on the following medication(s):    Medication Request:  Requested Prescriptions     Pending Prescriptions Disp Refills    amLODIPine (NORVASC) 10 MG tablet [Pharmacy Med Name: AMLODIPINE BESYLATE TABS 10MG] 90 tablet 3     Sig: TAKE 1 TABLET IN THE MORNING       Last Visit Date (If Applicable):  2/52/6205    Next Visit Date:    4/13/2023

## 2023-01-30 RX ORDER — VALSARTAN AND HYDROCHLOROTHIAZIDE 160; 12.5 MG/1; MG/1
TABLET, FILM COATED ORAL
Qty: 90 TABLET | Refills: 1 | Status: SHIPPED | OUTPATIENT
Start: 2023-01-30 | End: 2023-01-31 | Stop reason: SDUPTHER

## 2023-01-30 RX ORDER — PRAVASTATIN SODIUM 40 MG
TABLET ORAL
Qty: 90 TABLET | Refills: 3 | OUTPATIENT
Start: 2023-01-30

## 2023-01-30 NOTE — TELEPHONE ENCOUNTER
Manolo Rueda is calling to request a refill on the following medication(s):    Medication Request:  Requested Prescriptions     Pending Prescriptions Disp Refills    valsartan-hydroCHLOROthiazide (DIOVAN-HCT) 160-12.5 MG per tablet [Pharmacy Med Name: VALSARTAN/HCTZ TABS 160/12.5MG] 90 tablet 3     Sig: TAKE 1 TABLET DAILY    pravastatin (PRAVACHOL) 40 MG tablet [Pharmacy Med Name: PRAVASTATIN TABS 40MG] 90 tablet 3     Sig: TAKE 1 TABLET DAILY       Last Visit Date (If Applicable):  2/25/5120    Next Visit Date:    4/13/2023

## 2023-01-31 DIAGNOSIS — I10 ESSENTIAL HYPERTENSION: ICD-10-CM

## 2023-01-31 RX ORDER — VALSARTAN AND HYDROCHLOROTHIAZIDE 160; 12.5 MG/1; MG/1
TABLET, FILM COATED ORAL
Qty: 90 TABLET | Refills: 0 | Status: SHIPPED | OUTPATIENT
Start: 2023-01-31

## 2023-01-31 RX ORDER — PRAVASTATIN SODIUM 40 MG
TABLET ORAL
Qty: 90 TABLET | Refills: 0 | Status: SHIPPED | OUTPATIENT
Start: 2023-01-31

## 2023-01-31 RX ORDER — AMLODIPINE BESYLATE 10 MG/1
TABLET ORAL
Qty: 90 TABLET | Refills: 0 | Status: SHIPPED | OUTPATIENT
Start: 2023-01-31

## 2023-01-31 NOTE — TELEPHONE ENCOUNTER
[facility] is calling to request a refill on the following medication(s):    Last Visit Date (If Applicable):  3/42/1291    Next Visit Date:    4/13/2023    Medication Request:  Requested Prescriptions     Pending Prescriptions Disp Refills    pravastatin (PRAVACHOL) 40 MG tablet 90 tablet 0     Sig: TAKE ONE TABLET BY MOUTH EVERY DAY    valsartan-hydroCHLOROthiazide (DIOVAN-HCT) 160-12.5 MG per tablet 90 tablet 0     Sig: TAKE 1 TABLET DAILY    amLODIPine (NORVASC) 10 MG tablet 90 tablet 0     Sig: TAKE 1 TABLET IN THE MORNING

## 2023-02-14 ENCOUNTER — OFFICE VISIT (OUTPATIENT)
Dept: NEUROLOGY | Age: 63
End: 2023-02-14
Payer: COMMERCIAL

## 2023-02-14 VITALS
HEIGHT: 72 IN | DIASTOLIC BLOOD PRESSURE: 61 MMHG | BODY MASS INDEX: 30.77 KG/M2 | HEART RATE: 55 BPM | WEIGHT: 227.2 LBS | SYSTOLIC BLOOD PRESSURE: 103 MMHG

## 2023-02-14 DIAGNOSIS — R13.10 DYSPHAGIA, UNSPECIFIED TYPE: Primary | ICD-10-CM

## 2023-02-14 PROCEDURE — G8482 FLU IMMUNIZE ORDER/ADMIN: HCPCS | Performed by: PSYCHIATRY & NEUROLOGY

## 2023-02-14 PROCEDURE — 99214 OFFICE O/P EST MOD 30 MIN: CPT | Performed by: PSYCHIATRY & NEUROLOGY

## 2023-02-14 PROCEDURE — 1036F TOBACCO NON-USER: CPT | Performed by: PSYCHIATRY & NEUROLOGY

## 2023-02-14 PROCEDURE — G8417 CALC BMI ABV UP PARAM F/U: HCPCS | Performed by: PSYCHIATRY & NEUROLOGY

## 2023-02-14 PROCEDURE — 3078F DIAST BP <80 MM HG: CPT | Performed by: PSYCHIATRY & NEUROLOGY

## 2023-02-14 PROCEDURE — 3017F COLORECTAL CA SCREEN DOC REV: CPT | Performed by: PSYCHIATRY & NEUROLOGY

## 2023-02-14 PROCEDURE — 3074F SYST BP LT 130 MM HG: CPT | Performed by: PSYCHIATRY & NEUROLOGY

## 2023-02-14 PROCEDURE — G8427 DOCREV CUR MEDS BY ELIG CLIN: HCPCS | Performed by: PSYCHIATRY & NEUROLOGY

## 2023-02-14 RX ORDER — LATANOPROST 50 UG/ML
SOLUTION/ DROPS OPHTHALMIC
COMMUNITY
Start: 2023-01-16

## 2023-02-14 RX ORDER — MOXIFLOXACIN 5 MG/ML
SOLUTION/ DROPS OPHTHALMIC
COMMUNITY
Start: 2022-11-16

## 2023-02-14 RX ORDER — PREDNISOLONE ACETATE 10 MG/ML
SUSPENSION/ DROPS OPHTHALMIC
COMMUNITY
Start: 2022-12-13

## 2023-02-14 RX ORDER — DORZOLAMIDE HCL 20 MG/ML
SOLUTION/ DROPS OPHTHALMIC
COMMUNITY
Start: 2023-01-16

## 2023-02-14 RX ORDER — ERYTHROMYCIN 5 MG/G
OINTMENT OPHTHALMIC
COMMUNITY
Start: 2023-01-03

## 2023-02-14 NOTE — PROGRESS NOTES
[address]  [address].  [facility], [address] [place] [address], [address] [place] [address]  Ph: [phone] or [phone]  FAX: [phone]    Chief Complaint: Oropharyngeal Dysphagia, myasthenia gravis     Dear Gómez Beard MD     I had the pleasure of seeing your patient today in neurology consultation for his symptoms. As you would recall Reyes Field is a 57 y.o. male. Patient has reports with a history of difficulty swallowing since November 2020. Patient has reported to have been seen by multiple specialist in the past and was referred to neurology. He has previously completed a swallow test which was unable to identify cause of difficulty. He admits his weight has remained stable once he found foods he could eat with minimal difficulty. He reports he is eating softer, smaller foods with more ease. Furthermore, he still experiences numbness and tingling in his fingers on the right hand thought to be caused by the injury. Patient reports his diagnosis was not consistent with myasthenia gravis at this time and saw the [facility] for a second opinion. Denies urine and bowel incontinence. The patient is presenting as a follow-up on 9/6/2022. He reports that his symptoms have been stable since his previous visit when he was given steroids. However, he reports that his swallowing has not returned to baseline yet. He reports improvement in his conversational skills, breathing, and can eat more foods. He denies any side effects of the medications. He continues to have difficulty swallowing and has to watch what he eats. He denies diplopia. Today, the patient presents on 02/14/23 as a follow-up for oropharyngeal dysphagia. Patient reports compliance with medications, Potassium Bromide 60 mg/5 mL 10 mL + 5 mL + 5mL, and notices slight relief in dysphagia and denies side effects. He reports his symptoms are consistent throughout the day. He reports seeing a gastroenterologist, VIVI Guerrero. 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 Continues to have difficulty swallowing with solid food only-no structural causes identified during upper endoscopy and upper GI/esophagram. Esophageal biopsies were negative. Reports swallowing-\" fatigue \". Unable to initiate swallowing during fatigue episodes. Was referred to neurology-myasthenia gravis antibodies appear to be favoring diagnosis. Had emperic dilation of non-obstructive Schatzki at [facility] gastroenterology\"      Past Medical History:   Diagnosis Date    Arthritis     neck    BPH (benign prostatic hyperplasia)     Class 2 severe obesity due to excess calories with serious comorbidity in adult [facility])     Obesity (BMI 30.0-34. 9) [E66.9]    Dysphagia     Erectile dysfunction     Full dentures     GERD (gastroesophageal reflux disease)     Hiatal hernia     Hyperlipidemia     Hypertension     YUMIKO on CPAP     PONV (postoperative nausea and vomiting)     nauseated after last surgery    Unspecified sleep apnea     cpap nightly     Past Surgical History:   Procedure Laterality Date    COLONOSCOPY      ENDOSCOPY, COLON, DIAGNOSTIC      ESOPHAGEAL MOTILITY STUDY N/A 3/17/2021    ESOPHAGEAL MOTILITY STUDY performed by Washington El MD at [address] [facility] N/A 7/16/2021    CYSTO  FULGURATION  EXCISION PENILE WARTS performed by Je Hayes MD at [facility] N/A 7/31/2018    SUPRA PUBIC LESIONS BIOPSY EXCISION performed by Je Hayes MD at [facility] Bilateral 07/31/2018    SUPRA PUBIC LESIONS BIOPSY EXCISION (N/A )    THROAT SURGERY  2008    TONSILLECTOMY      TUMOR REMOVAL Right 09/23/2016    [facility]  right thigh   (benign)    UPPER GASTROINTESTINAL ENDOSCOPY N/A 12/21/2020    EGD BIOPSY performed by Washington El MD at [address] [[facility]]      Patient states he was told he had allergy as a child. Has taken penicillin related medications in past with no problems.      Family History   Problem Relation Age of Onset    Heart Disease Mother       Social History     Socioeconomic History    Marital status:      Spouse name: Not on file    Number of children: Not on file    Years of education: Not on file    Highest education level: Not on file   Occupational History    Not on file   Tobacco Use    Smoking status: Never    Smokeless tobacco: Never   Vaping Use    Vaping Use: Never used   Substance and Sexual Activity    Alcohol use: Not Currently    Drug use: No    Sexual activity: Not on file   Other Topics Concern    Not on file   Social History Narrative    Not on file     Social Determinants of Health     Financial Resource Strain: Low Risk     Difficulty of Paying Living Expenses: Not hard at all   Food Insecurity: No Food Insecurity    Worried About Running Out of Food in the Last Year: Never true    Ran Out of Food in the Last Year: Never true   Transportation Needs: Not on file   Physical Activity: Not on file   Stress: Not on file   Social Connections: Not on file   Intimate Partner Violence: Not on file   Housing Stability: Not on file      Ht 6' (1.829 m)   Wt 227 lb 3.2 oz (103.1 kg)   BMI 30.81 kg/m²      HEENT [] Hearing Loss  [] Visual Disturbance  [] Tinnitus  [] Eye pain   Respiratory [] Shortness of Breath  [] Cough  [] Snoring   Cardiovascular [] Chest Pain  [] Palpitations  [] Lightheaded   GI [] Constipation  [] Diarrhea  [x] Swallowing change  [] Nausea/vomiting    [] Urinary Frequency  [] Urinary Urgency   Musculoskeletal [] Neck pain  [] Back pain  [] Muscle pain  [] Restless legs   Dermatologic [] Skin changes   Neurologic [] Memory loss/confusion  [] Seizures  [] Trouble walking or imbalance  [] Dizziness  [] Sleep disturbance  [] Weakness  [x] Numbness  [] Tremors  [] Speech Difficulty  [] Headaches  [] Light Sensitivity  [] Sound Sensitivity   Endocrinology []Excessive thirst  []Excessive hunger   Psychiatric [] Anxiety/Depression  [] Hallucination   Allergy/immunology []Hives/environmental allergies   Hematologic/lymph [] Abnormal bleeding  [] Abnormal bruising     General appearence: Normal. Well groomed. In no acute distress    Head: Normocephalic, atraumatic  Eyes: Extraocular movements intact, eye lids normal  Lungs: Respirations unlabored, chest wall no deformity  ENT: Normal external ear canals, no sinus tenderness  Heart: Regular rate rhythm  Abdomen: No masses, tenderness  Extremities: No cyanosis or edema, 2+ pulses  Musculoskeletal: Normal range of motion in all joints  Skin: Intact, normal skin color    Neurological examination:    Mental status   Alert and oriented; intact memory with no confusion, speech or language problems; no hallucinations or delusions     Cranial nerves   II - visual fields intact to confrontation                                                III, IV, VI - extra-ocular muscles full: no pupillary defect; no ANALI, no nystagmus, no ptosis   V - normal facial sensation                                                               VII - normal facial symmetry                                                             VIII - intact hearing                                                                             IX, X - symmetrical palate                                                                  XI - symmetrical shoulder shrug                                                       XII - midline tongue without atrophy or fasciculation     Motor function  Normal muscle bulk and tone; normal power 5/5, including fine motor movements     Sensory function Intact to touch, pin, vibration, proprioception     Cerebellar Intact fine motor movement. No involuntary movements or tremors     Reflex function Intact 2+ DTR and symmetric.  Negative Babinski     Gait                  Normal station and gait       Lab Results   Component Value Date LDLCHOLESTEROL 63 04/06/2022     No components found for: CHLPL  Lab Results   Component Value Date    TRIG 66 04/06/2022    TRIG 47 06/01/2021    TRIG 69 06/24/2020     Lab Results   Component Value Date    HDL 49 04/06/2022    HDL 52 06/01/2021    HDL 46 06/24/2020     No results found for: LDLCALC  No results found for: LABVLDL  Lab Results   Component Value Date    LABA1C 5.6 04/06/2022     Lab Results   Component Value Date     04/06/2022     Lab Results   Component Value Date    TNUAXAJV56 656 02/13/2014        Neurological work up:  CK on 8/4/2021 was within normal limits at 139 U/L  Myasthenia Gravis Panel performed on 8/4/2021 was within normal limits at 0.1 nmol/L  Myoglobin performed on 8/4/2021 was within normal limits at 34 ng/mL    MRI Brain W WO Contrast on 02/03/2021: No acute intracranial abnormality. Mild chronic microvascular disease within the periventricular white matter. MRI Cervical Spine WO Contrast on 02/03/2021: Moderate-sized broad-based right paracentral disc protrusion at C5-6 in mild compression of the cervical cord on the right-side. In addition, moderate central canal stenosis and moderate bilateral foraminal stenosis are identified at this level. Broad disc osteophyte complex at C6-C7 resulting in moderate central canal stenosis. Severe left foraminal stenosis and moderate right foraminal  stenosis are additionally appreciated at this level. Small central disc protrusion at C4-C5 with associated mild right foraminal stenosis. Small central disc protrusion at C3-C4 with associated moderate left foraminal stenosis. Moderate left foraminal stenosis at C2-C3. All of patient's labs were personally reviewed. All the imaging studies were personally reviewed and discussed with the patient. Assessment Recommendations:  Patient with oropharyngeal dysphagia.   Possible myasthenia gravis with positive striated muscle antibody titer    Patient previously has been seen by neuromuscular clinic in [facility] and has undergone single-fiber EMG nerve conduction study which has been negative for myasthenia gravis. I have discussed the case with patient's neuromuscular specialist at [facility] clinic. They do not believe patient has myasthenia and do not believe patient would benefit from continued pyridostigmine. However patient reports having improvement of his dysphagia on pyridostigmine and recent GI work-up has been inconclusive. Etiology for patient's dysphagia is still unclear. I recommend the patient to be referred to [address] [place] [address],[address] and a neuromuscular specialist for further work up. The patient agrees with the plan. I referred the patient. Patient is continuing to experience oropharyngeal dysphagia. I recommend he continue Pyridostigmine Bromide 60mg/5mL 10 mL + 10 mL + 5 mL. Patient to call me in 3-5 days or sooner if he experiences any side effects from the medication. I advise the patient to continue following up with gastroenterology, Dr. Olga Lidia Mills. All medication side effects were discussed and questions answered. Patient to follow up in 3-4 months or sooner if symptoms worsen. Nasir rCuz MD I would like to thank you for the consult. Please do not hesitate if you have any questions about the patient care. This note is created with the assistance of a speech-recognition program. While intending to generate a document that actually reflects the content of the visit, the document can still have some errors including those of syntax and sound a- like substitutions which may escape proofreading. In such instances, actual meaning can be extrapolated by contextual derivation.     Scribe Attestation:   By signing my name below, I, Jigna Boyce, attest that this documentation has been prepared under the direction and in the presence of Stewart Garcia MD.    Electronically Signed: Olegario Mcintosh. 02/14/23 9:01 AM     I, Doug Nathan MD, personally performed the services described in this documentation. All medical record entries made by the scribe were at my direction and in my presence. I have reviewed the chart and discharge instructions (if applicable) and agree that the record reflects my personal performance and is accurate and complete.     Electronically Signed: Chelsie Jernigan 2/26/2023 8:12 PM    Diplomate, American Board of Psychiatry and Neurology  Diplomate, American Board of Clinical Neurophysiology  Diplomate, American Board of Epilepsy

## 2023-02-16 ENCOUNTER — OFFICE VISIT (OUTPATIENT)
Dept: GASTROENTEROLOGY | Age: 63
End: 2023-02-16
Payer: COMMERCIAL

## 2023-02-16 VITALS
WEIGHT: 231 LBS | HEIGHT: 72 IN | HEART RATE: 54 BPM | DIASTOLIC BLOOD PRESSURE: 62 MMHG | BODY MASS INDEX: 31.29 KG/M2 | SYSTOLIC BLOOD PRESSURE: 115 MMHG

## 2023-02-16 DIAGNOSIS — R13.10 DYSPHAGIA, UNSPECIFIED TYPE: Primary | ICD-10-CM

## 2023-02-16 PROCEDURE — G8417 CALC BMI ABV UP PARAM F/U: HCPCS | Performed by: INTERNAL MEDICINE

## 2023-02-16 PROCEDURE — G8482 FLU IMMUNIZE ORDER/ADMIN: HCPCS | Performed by: INTERNAL MEDICINE

## 2023-02-16 PROCEDURE — 99212 OFFICE O/P EST SF 10 MIN: CPT | Performed by: INTERNAL MEDICINE

## 2023-02-16 PROCEDURE — 1036F TOBACCO NON-USER: CPT | Performed by: INTERNAL MEDICINE

## 2023-02-16 PROCEDURE — 3074F SYST BP LT 130 MM HG: CPT | Performed by: INTERNAL MEDICINE

## 2023-02-16 PROCEDURE — 3017F COLORECTAL CA SCREEN DOC REV: CPT | Performed by: INTERNAL MEDICINE

## 2023-02-16 PROCEDURE — 3078F DIAST BP <80 MM HG: CPT | Performed by: INTERNAL MEDICINE

## 2023-02-16 PROCEDURE — G8428 CUR MEDS NOT DOCUMENT: HCPCS | Performed by: INTERNAL MEDICINE

## 2023-02-16 ASSESSMENT — ENCOUNTER SYMPTOMS
RECTAL PAIN: 0
CONSTIPATION: 0
RESPIRATORY NEGATIVE: 1
WHEEZING: 0
COLOR CHANGE: 0
DIARRHEA: 0
APNEA: 0
GASTROINTESTINAL NEGATIVE: 1
ANAL BLEEDING: 0
SORE THROAT: 0
TROUBLE SWALLOWING: 1
CHOKING: 0
COUGH: 0
VOICE CHANGE: 0
NAUSEA: 0
VOMITING: 0
BLOOD IN STOOL: 0
SHORTNESS OF BREATH: 0
ABDOMINAL PAIN: 0
ABDOMINAL DISTENTION: 0

## 2023-02-16 NOTE — PROGRESS NOTES
GI FOLLOW UP    INTERVAL HISTORY:       Subacute progression of the patient's oropharyngeal dysphagia and dysphonia that appears to be neuromuscular in nature. Ongoing work-up for myasthenia gravis and medication adjustments being discussed however no conclusion identified with regards to etiology, patient being referred to [facility]    Chief Complaint   Patient presents with    Dysphagia       1. Dysphagia, unspecified type          HISTORY OF PRESENT ILLNESS: Naa Figueroa is a 59 y.o. 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  Patient reports that he had scrambled eggs and toast and since then he has been having difficulty swallowing localized to the oropharyngeal area. Denies any globus sensation. Denies any regurgitation or chest symptoms. No other GI symptoms. Patient reports that he is able to tolerate liquids with more thicker consistency currently which is improvement from previous. Has yet to try solid food due to fear of dysphagia. Denies any neurological symptoms that might suggest a possible TIA or CVA. 2007--throat surgery-- resection of benign growth around vocal cords. Smoker: none   Drinking history: 10 months quit, social  Abdominal surgeries: none   Prior Colonoscopy: 2017--next in 10 yrs  Prior EGD: 2010 dysphagia symptoms at that time  FH of GI issues: none    Past Medical,Family, and Social History reviewed and does contribute to the patient presenting condition.     Patient's PMH/PSH,SH,PSYCH Hx, MEDs, ALLERGIES, and ROS were all reviewed and updated in the appropriate sections. PAST MEDICAL HISTORY:  Past Medical History:   Diagnosis Date    Arthritis     neck    BPH (benign prostatic hyperplasia)     Class 2 severe obesity due to excess calories with serious comorbidity in adult [facility])     Obesity (BMI 30.0-34. 9) [E66.9]    Dysphagia     Erectile dysfunction     Full dentures     GERD (gastroesophageal reflux disease)     Hiatal hernia     Hyperlipidemia     Hypertension     YUMIKO on CPAP     PONV (postoperative nausea and vomiting)     nauseated after last surgery    Unspecified sleep apnea     cpap nightly       Past Surgical History:   Procedure Laterality Date    COLONOSCOPY      ENDOSCOPY, COLON, DIAGNOSTIC      ESOPHAGEAL MOTILITY STUDY N/A 3/17/2021    ESOPHAGEAL MOTILITY STUDY performed by Richie Albright MD at [address] N/A 7/16/2021    CYSTO  FULGURATION  EXCISION PENILE WARTS performed by Luz Reed MD at [address] N/A 7/31/2018    SUPRA PUBIC LESIONS BIOPSY EXCISION performed by Luz Reed MD at [address] [facility] Bilateral 07/31/2018    SUPRA PUBIC LESIONS BIOPSY EXCISION (N/A )    THROAT SURGERY  2008    TONSILLECTOMY      TUMOR REMOVAL Right 09/23/2016    [facility]  right thigh   (benign)    UPPER GASTROINTESTINAL ENDOSCOPY N/A 12/21/2020    EGD BIOPSY performed by Richie Albright MD at [facility]       CURRENT MEDICATIONS:    Current Outpatient Medications:     dorzolamide (TRUSOPT) 2 % ophthalmic solution, , Disp: , Rfl:     latanoprost (XALATAN) 0.005 % ophthalmic solution, , Disp: , Rfl:     prednisoLONE acetate (PRED FORTE) 1 % ophthalmic suspension, PLEASE SEE ATTACHED FOR DETAILED DIRECTIONS, Disp: , Rfl:     pravastatin (PRAVACHOL) 40 MG tablet, TAKE ONE TABLET BY MOUTH EVERY DAY, Disp: 90 tablet, Rfl: 0    valsartan-hydroCHLOROthiazide (DIOVAN-HCT) 160-12.5 MG per tablet, TAKE 1 TABLET DAILY, Disp: 90 tablet, Rfl: 0    amLODIPine (NORVASC) 10 MG tablet, TAKE 1 TABLET IN THE MORNING, Disp: 90 tablet, Rfl: 0    loratadine (CLARITIN) 10 MG tablet, Take 1 tablet by mouth daily, Disp: 30 tablet, Rfl: 0    omeprazole (PRILOSEC) 20 MG delayed release capsule, Take 1 capsule by mouth nightly, Disp: 90 capsule, Rfl: 2    Pyridostigmine Bromide 60 MG/5ML SOLN, TAKE ONE TEASPOONFUL (5 ML) BY MOUTH THREE TIMES A DAY, Disp: 450 mL, Rfl: 11    spironolactone (ALDACTONE) 50 MG tablet, TAKE ONE TABLET BY MOUTH EVERY DAY, Disp: 90 tablet, Rfl: 0    fluticasone (FLONASE) 50 MCG/ACT nasal spray, 1 spray by Each Nostril route daily (Patient taking differently: 1 spray by Each Nostril route daily Indications: Patient using prn), Disp: 2 Bottle, Rfl: 1    azelastine (ASTELIN) 0.1 % nasal spray, 1 spray by Nasal route 2 times daily Use in each nostril as directed (Patient taking differently: 1 spray by Nasal route 2 times daily Indications: Patient using PRN Use in each nostril as directed), Disp: 2 Bottle, Rfl: 1    tamsulosin (FLOMAX) 0.4 MG capsule, Take 0.4 mg by mouth daily, Disp: , Rfl:     erythromycin (ROMYCIN) 5 MG/GM ophthalmic ointment, APPLY A SMALL AMOUNT INTO LEFT EYE TWICE A DAY AS NEEDED FOR PAIN OR IRRITATION (Patient not taking: Reported on 2/16/2023), Disp: , Rfl:     moxifloxacin (VIGAMOX) 0.5 % ophthalmic solution, INSTILL 1 DROP INTO AFFECTED EYE FOUR TIMES A DAY AS DIRECTED STARTING 2 DAYS BEFORE SURGERY (Patient not taking: Reported on 2/16/2023), Disp: , Rfl:     potassium chloride (KLOR-CON M) 20 MEQ extended release tablet, TAKE ONE TABLET BY MOUTH EVERY DAY (Patient not taking: Reported on 2/16/2023), Disp: 90 tablet, Rfl: 1    ALLERGIES:   Allergies   Allergen Reactions    Pcn [Penicillins]      Patient states he was told he had allergy as a child. Has taken penicillin related medications in past with no problems.        FAMILY HISTORY:       Problem Relation Age of Onset    Heart Disease Mother          SOCIAL HISTORY:   Social History     Socioeconomic History    Marital status:      Spouse name: Not on file    Number of children: Not on file    Years of education: Not on file    Highest education level: Not on file   Occupational History    Not on file   Tobacco Use    Smoking status: Never    Smokeless tobacco: Never   Vaping Use    Vaping Use: Never used   Substance and Sexual Activity    Alcohol use: Not Currently    Drug use: No    Sexual activity: Not on file   Other Topics Concern    Not on file   Social History Narrative    Not on file     Social Determinants of Health     Financial Resource Strain: Low Risk     Difficulty of Paying Living Expenses: Not hard at all   Food Insecurity: No Food Insecurity    Worried About Running Out of Food in the Last Year: Never true    Ran Out of Food in the Last Year: Never true   Transportation Needs: Not on file   Physical Activity: Not on file   Stress: Not on file   Social Connections: Not on file   Intimate Partner Violence: Not on file   Housing Stability: Not on file       REVIEW OF SYSTEMS: A 12-point review of systems was obtained and pertinent positives and negatives were listed below. REVIEW OF SYSTEMS:     Constitutional: No fever, no chills, no lethargy, no weakness. HEENT:  No headache, otalgia, itchy eyes, nasal discharge or sore throat. Cardiac:  No chest pain, dyspnea, orthopnea or PND. Chest:   No cough, phlegm or wheezing. Abdomen:      Detailed by MA   Neuro:  No focal weakness, abnormal movements or seizure like activity. Skin:   No rashes, no itching. :   No hematuria, no pyuria, no dysuria, no flank pain. Extremities:  No swelling or joint pains. ROS was otherwise negative    Review of Systems   Constitutional: Negative. Negative for appetite change, fatigue, fever and unexpected weight change. HENT:  Positive for trouble swallowing. Negative for sore throat and voice change. Eyes:  Positive for visual disturbance (wears glasses). Respiratory: Negative.   Negative for apnea, cough, choking, shortness of breath and wheezing. Cardiovascular: Negative. Negative for chest pain, palpitations and leg swelling. Gastrointestinal: Negative. Negative for abdominal distention, abdominal pain, anal bleeding, blood in stool, constipation, diarrhea, nausea, rectal pain and vomiting. Genitourinary: Negative. Negative for difficulty urinating. Skin: Negative. Negative for color change and rash. Neurological: Negative. Negative for dizziness, seizures, weakness, light-headedness, numbness and headaches. Hematological: Negative. Does not bruise/bleed easily. Psychiatric/Behavioral: Negative. Negative for confusion and sleep disturbance. The patient is not nervous/anxious. PHYSICAL EXAMINATION: Vital signs reviewed per the nursing documentation. /62 (Site: Right Upper Arm, Position: Sitting, Cuff Size: Large Adult)   Pulse 54   Ht 6' (1.829 m)   Wt 231 lb (104.8 kg)   BMI 31.33 kg/m²   Body mass index is 31.33 kg/m². Physical Exam    Physical Exam   Constitutional: Patient is oriented to person, place, and time. Patient appears well-developed and well-nourished. HENT:   Head: Normocephalic and atraumatic. Eyes: Pupils are equal, round, and reactive to light. EOM are normal.   Neck: Normal range of motion. Neck supple. No JVD present. No tracheal deviation present. No thyromegaly present. Cardiovascular: Normal rate, regular rhythm, normal heart sounds and intact distal pulses. Pulmonary/Chest: Effort normal and breath sounds normal. No stridor. No respiratory distress. He has no wheezes. He has no rales. He exhibits no tenderness. Abdominal: Soft. Bowel sounds are normal. He exhibits no distension and no mass. There is no tenderness. There is no rebound and no guarding. No hernia. Musculoskeletal: Normal range of motion. Lymphadenopathy:    Patient has no cervical adenopathy. Neurological: Patient is alert and oriented to person, place, and time. Psychiatric: Patient has a normal mood and affect. Patient behavior is normal.       LABORATORY DATA: Reviewed  Lab Results   Component Value Date    WBC 5.1 04/06/2022    HGB 13.6 04/06/2022    HCT 41.8 04/06/2022    MCV 86.5 04/06/2022     04/06/2022     04/06/2022    K 3.8 04/06/2022     04/06/2022    CO2 28 04/06/2022    BUN 12 04/06/2022    CREATININE 1.03 04/06/2022    LABPROT 7.9 05/09/2012    LABALBU 4.6 04/06/2022    BILITOT 0.94 04/06/2022    ALKPHOS 85 04/06/2022    AST 21 04/06/2022    ALT 23 04/06/2022         Lab Results   Component Value Date    RBC 4.83 04/06/2022    HGB 13.6 04/06/2022    MCV 86.5 04/06/2022    MCH 28.2 04/06/2022    MCHC 32.5 04/06/2022    RDW 13.7 04/06/2022    MPV 9.5 04/06/2022    BASOPCT 1 07/20/2018    LYMPHSABS 1.60 07/20/2018    MONOSABS 0.40 07/20/2018    NEUTROABS 3.00 07/20/2018    EOSABS 0.00 07/20/2018    BASOSABS 0.00 07/20/2018         DIAGNOSTIC TESTING:     No results found. IMPRESSION: Mr.Ernest Le Madera is a 56 y.o. 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  Patient reports that he had scrambled eggs and toast and since then he has been having difficulty swallowing localized to the oropharyngeal area. Denies any globus sensation. Denies any regurgitation or chest symptoms. No other GI symptoms. Patient reports that he is able to tolerate liquids with more thicker consistency currently which is improvement from previous. Has yet to try solid food due to fear of dysphagia. Denies any neurological symptoms that might suggest a possible TIA or CVA.         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-eradicated with negative breath test  Continues to have difficulty swallowing with solid food only-no structural causes identified during upper endoscopy and upper GI/esophagram  Esophageal biopsies were negative  Reports swallowing-\" fatigue \"  Unable to initiate swallowing during fatigue episodes  Was referred to neurology-myasthenia gravis antibodies appear to be favoring diagnosis        Had emperic dilation of non-obstructive Schatzki at [facility] [employer] clinic gastroenterology            Assessment  1. Dysphagia, unspecified type        Elaina Davis was seen today for follow-up. Diagnoses and all orders for this visit:    Gastroesophageal reflux disease without esophagitis-patient continue with Prilosec 20 mg every evening. Symptoms appear to well-controlled    H. pylori infection-treated and eradicated with negative breath test.    Change in voice-presumably related to the patient's previous presumed diagnosis of myasthenia gravis, ongoing work-up by neurology. Responded well with pyridostigmine however developing tolerance, ongoing follow-up with neurology. Referral being placed to [address] [place] [address],[address] for further follow-up. Dysphagia, unspecified type-neuromuscular nature, had empiric dilation at [facility] for Schatzki's ring. We will increase the patient's PPI therapy to twice daily. Monitor symptoms. The patient may require evaluation and functional GI clinic, will provide referral to [facility]. Other orders  -     omeprazole (PRILOSEC OTC) 20 MG tablet; Take 1 tablet by mouth every evening         RTC: 3 months    Additional comments: Thank you for allowing me to participate in the care of Mr. Fior Nayak. For any further questions please do not hesitate to contact me. I have reviewed and agree with the ROS entered by the MA/LPN from today's encounter documented in a separate note.         Georgie Mcardle MD, MPH   Board Certified in Gastroenterology  Board Certified in [address] [place] [address] #: [phone]    this note is created with the assistance of a speech recognition program.  While intending to generate a document that actually reflects the content of the visit, the document can still have some errors including those of syntax and sound a like substitutions which may escape proof reading. It such instances, actual meaning can be extrapolated by contextual diversion.

## 2023-02-27 ENCOUNTER — TELEPHONE (OUTPATIENT)
Dept: INTERNAL MEDICINE CLINIC | Age: 63
End: 2023-02-27

## 2023-02-27 DIAGNOSIS — U07.1 COVID-19: Primary | ICD-10-CM

## 2023-02-27 RX ORDER — LORATADINE 10 MG/1
10 TABLET ORAL DAILY
Qty: 30 TABLET | Refills: 0 | Status: SHIPPED | OUTPATIENT
Start: 2023-02-27

## 2023-02-27 RX ORDER — AZELASTINE 1 MG/ML
1 SPRAY, METERED NASAL 2 TIMES DAILY
Qty: 2 EACH | Refills: 1 | Status: SHIPPED | OUTPATIENT
Start: 2023-02-27

## 2023-02-27 RX ORDER — NIRMATRELVIR AND RITONAVIR 150-100 MG
KIT ORAL
Qty: 20 TABLET | Refills: 0 | Status: SHIPPED | OUTPATIENT
Start: 2023-02-27 | End: 2023-03-04

## 2023-02-27 NOTE — TELEPHONE ENCOUNTER
Pt pharmacy states that they need an alternative for azelastine nasal spray, it is not covered under patient insurance.

## 2023-02-27 NOTE — TELEPHONE ENCOUNTER
[facility] is calling to request a refill on the following medication(s):    Medication Request:  Requested Prescriptions     Pending Prescriptions Disp Refills    azelastine (ASTELIN) 0.1 % nasal spray 2 each 1     Si spray by Nasal route 2 times daily Use in each nostril as directed    loratadine (CLARITIN) 10 MG tablet 30 tablet 0     Sig: Take 1 tablet by mouth daily       Last Visit Date (If Applicable):  3/32/7974    Next Visit Date:    2023

## 2023-02-27 NOTE — TELEPHONE ENCOUNTER
Patient complained of COVID symptoms Saturday with cough and running nose and went to [facility] to get tested for COVID results came back Positive yesterday 2/26/2023 and will like to know what do you recommend he should do next?

## 2023-03-18 DIAGNOSIS — I10 ESSENTIAL HYPERTENSION: ICD-10-CM

## 2023-03-20 RX ORDER — VALSARTAN AND HYDROCHLOROTHIAZIDE 160; 12.5 MG/1; MG/1
TABLET, FILM COATED ORAL
Qty: 90 TABLET | Refills: 0 | Status: SHIPPED | OUTPATIENT
Start: 2023-03-20

## 2023-03-20 RX ORDER — AMLODIPINE BESYLATE 10 MG/1
TABLET ORAL
Qty: 90 TABLET | Refills: 0 | Status: SHIPPED | OUTPATIENT
Start: 2023-03-20

## 2023-03-20 RX ORDER — PRAVASTATIN SODIUM 40 MG
TABLET ORAL
Qty: 90 TABLET | Refills: 0 | Status: SHIPPED | OUTPATIENT
Start: 2023-03-20

## 2023-03-20 NOTE — TELEPHONE ENCOUNTER
Red[facility] French is calling to request a refill on the following medication(s):    Medication Request:  Requested Prescriptions     Pending Prescriptions Disp Refills    valsartan-hydroCHLOROthiazide (DIOVAN-HCT) 160-12.5 MG per tablet [Pharmacy Med Name: Valsartan-hydroCHLOROthiazide 160-12.5 MG Oral Tablet] 90 tablet 0     Sig: TAKE 1 TABLET BY MOUTH DAILY    amLODIPine (NORVASC) 10 MG tablet [Pharmacy Med Name: amLODIPine Besylate 10 MG Oral Tablet] 90 tablet 0     Sig: TAKE 1 TABLET BY MOUTH IN THE  MORNING    pravastatin (PRAVACHOL) 40 MG tablet [Pharmacy Med Name: Pravastatin Sodium 40 MG Oral Tablet] 90 tablet 0     Sig: TAKE 1 TABLET BY MOUTH DAILY       Last Visit Date (If Applicable):  4/93/5126    Next Visit Date:    4/13/2023

## 2023-04-13 PROBLEM — D36.7 BENIGN NEOPLASM OF LOWER EXTREMITY: Status: RESOLVED | Noted: 2017-06-16 | Resolved: 2023-04-13

## 2023-05-19 ENCOUNTER — OFFICE VISIT (OUTPATIENT)
Dept: PULMONOLOGY | Age: 63
End: 2023-05-19
Payer: COMMERCIAL

## 2023-05-19 VITALS
OXYGEN SATURATION: 100 % | DIASTOLIC BLOOD PRESSURE: 64 MMHG | RESPIRATION RATE: 16 BRPM | HEIGHT: 72 IN | SYSTOLIC BLOOD PRESSURE: 110 MMHG | HEART RATE: 56 BPM | BODY MASS INDEX: 33.59 KG/M2 | WEIGHT: 248 LBS

## 2023-05-19 DIAGNOSIS — G47.33 OSA ON CPAP: Primary | ICD-10-CM

## 2023-05-19 DIAGNOSIS — E66.9 OBESITY (BMI 35.0-39.9 WITHOUT COMORBIDITY): ICD-10-CM

## 2023-05-19 DIAGNOSIS — Z99.89 OSA ON CPAP: Primary | ICD-10-CM

## 2023-05-19 DIAGNOSIS — I10 ESSENTIAL HYPERTENSION: ICD-10-CM

## 2023-05-19 PROCEDURE — 3017F COLORECTAL CA SCREEN DOC REV: CPT | Performed by: INTERNAL MEDICINE

## 2023-05-19 PROCEDURE — G8417 CALC BMI ABV UP PARAM F/U: HCPCS | Performed by: INTERNAL MEDICINE

## 2023-05-19 PROCEDURE — 1036F TOBACCO NON-USER: CPT | Performed by: INTERNAL MEDICINE

## 2023-05-19 PROCEDURE — 3074F SYST BP LT 130 MM HG: CPT | Performed by: INTERNAL MEDICINE

## 2023-05-19 PROCEDURE — G8427 DOCREV CUR MEDS BY ELIG CLIN: HCPCS | Performed by: INTERNAL MEDICINE

## 2023-05-19 PROCEDURE — 3078F DIAST BP <80 MM HG: CPT | Performed by: INTERNAL MEDICINE

## 2023-05-19 PROCEDURE — 99214 OFFICE O/P EST MOD 30 MIN: CPT | Performed by: INTERNAL MEDICINE

## 2023-05-19 ASSESSMENT — SLEEP AND FATIGUE QUESTIONNAIRES
HOW LIKELY ARE YOU TO NOD OFF OR FALL ASLEEP WHILE SITTING AND TALKING TO SOMEONE: 1
HOW LIKELY ARE YOU TO NOD OFF OR FALL ASLEEP WHILE LYING DOWN TO REST IN THE AFTERNOON WHEN CIRCUMSTANCES PERMIT: 3
HOW LIKELY ARE YOU TO NOD OFF OR FALL ASLEEP WHILE WATCHING TV: 2
HOW LIKELY ARE YOU TO NOD OFF OR FALL ASLEEP WHILE SITTING AND READING: 2
ESS TOTAL SCORE: 10
HOW LIKELY ARE YOU TO NOD OFF OR FALL ASLEEP WHILE SITTING QUIETLY AFTER LUNCH WITHOUT ALCOHOL: 1
HOW LIKELY ARE YOU TO NOD OFF OR FALL ASLEEP WHEN YOU ARE A PASSENGER IN A CAR FOR AN HOUR WITHOUT A BREAK: 1
HOW LIKELY ARE YOU TO NOD OFF OR FALL ASLEEP WHILE SITTING INACTIVE IN A PUBLIC PLACE: 0
HOW LIKELY ARE YOU TO NOD OFF OR FALL ASLEEP IN A CAR, WHILE STOPPED FOR A FEW MINUTES IN TRAFFIC: 0

## 2023-05-19 NOTE — PROGRESS NOTES
Provider   loratadine (CLARITIN) 10 MG tablet Take 1 tablet by mouth daily 4/13/23  Yes Machelle Michel MD   azelastine (ASTELIN) 0.1 % nasal spray 1 spray by Nasal route 2 times daily Use in each nostril as directed 4/13/23  Yes Machelle Michel MD   valsartan-hydroCHLOROthiazide (DIOVAN-HCT) 160-12.5 MG per tablet TAKE 1 TABLET BY MOUTH DAILY 3/20/23  Yes Machelle Michel MD   amLODIPine (NORVASC) 10 MG tablet TAKE 1 TABLET BY MOUTH IN THE  MORNING 3/20/23  Yes Machelle Michel MD   pravastatin (PRAVACHOL) 40 MG tablet TAKE 1 TABLET BY MOUTH DAILY 3/20/23  Yes Machelle Michel MD   dorzolamide (TRUSOPT) 2 % ophthalmic solution  1/16/23  Yes Historical Provider, MD   erythromycin [facility]) 5 MG/GM ophthalmic ointment  1/3/23  Yes Historical Provider, MD   latanoprost (XALATAN) 0.005 % ophthalmic solution  1/16/23  Yes Historical Provider, MD   moxifloxacin (VIGAMOX) 0.5 % ophthalmic solution  11/16/22  Yes Historical Provider, MD   prednisoLONE acetate (PRED FORTE) 1 % ophthalmic suspension PLEASE SEE ATTACHED FOR DETAILED DIRECTIONS 12/13/22  Yes Historical Provider, MD   omeprazole (PRILOSEC) 20 MG delayed release capsule Take 1 capsule by mouth nightly 11/16/22  Yes Alec Mac MD   Pyridostigmine Bromide 60 MG/5ML SOLN TAKE ONE TEASPOONFUL (5 ML) BY MOUTH THREE TIMES A DAY 10/24/22  Yes Misty Merchant MD   potassium chloride (KLOR-CON M) 20 MEQ extended release tablet TAKE ONE TABLET BY MOUTH EVERY DAY 3/15/21  Yes Machelle Michel MD   spironolactone (ALDACTONE) 50 MG tablet TAKE ONE TABLET BY MOUTH EVERY DAY 11/2/20  Yes Machelle Michel MD   fluticasone (FLONASE) 50 MCG/ACT nasal spray 1 spray by Each Nostril route daily  Patient taking differently: 1 spray by Each Nostril route daily Indications: Patient using prn 7/17/20  Yes Machelle Michel MD   tamsulosin (FLOMAX) 0.4 MG capsule Take 1 capsule by mouth daily   Yes Brunetta Bloodgood, MD         Physical Exam  General Appearance:    Alert, cooperative, no distress, appears

## 2023-06-17 DIAGNOSIS — I10 ESSENTIAL HYPERTENSION: ICD-10-CM

## 2023-06-19 RX ORDER — VALSARTAN AND HYDROCHLOROTHIAZIDE 160; 12.5 MG/1; MG/1
TABLET, FILM COATED ORAL
Qty: 90 TABLET | Refills: 1 | Status: SHIPPED | OUTPATIENT
Start: 2023-06-19

## 2023-06-19 RX ORDER — PRAVASTATIN SODIUM 40 MG
TABLET ORAL
Qty: 90 TABLET | Refills: 1 | Status: SHIPPED | OUTPATIENT
Start: 2023-06-19

## 2023-06-19 RX ORDER — AMLODIPINE BESYLATE 10 MG/1
TABLET ORAL
Qty: 90 TABLET | Refills: 1 | Status: SHIPPED | OUTPATIENT
Start: 2023-06-19

## 2023-06-19 NOTE — TELEPHONE ENCOUNTER
Fredrick Mejia is calling to request a refill on the following medication(s):    Medication Request:  Requested Prescriptions     Pending Prescriptions Disp Refills    valsartan-hydroCHLOROthiazide (DIOVAN-HCT) 160-12.5 MG per tablet [Pharmacy Med Name: Valsartan-hydroCHLOROthiazide 160-12.5 MG Oral Tablet] 90 tablet 3     Sig: TAKE 1 TABLET BY MOUTH DAILY    pravastatin (PRAVACHOL) 40 MG tablet [Pharmacy Med Name: Pravastatin Sodium 40 MG Oral Tablet] 90 tablet 3     Sig: TAKE 1 TABLET BY MOUTH DAILY    amLODIPine (NORVASC) 10 MG tablet [Pharmacy Med Name: amLODIPine Besylate 10 MG Oral Tablet] 90 tablet 3     Sig: TAKE 1 TABLET BY MOUTH IN THE  MORNING       Last Visit Date (If Applicable):  1/57/3574    Next Visit Date:    8/14/2023

## 2023-07-12 ENCOUNTER — OFFICE VISIT (OUTPATIENT)
Dept: NEUROLOGY | Age: 63
End: 2023-07-12
Payer: COMMERCIAL

## 2023-07-12 VITALS
WEIGHT: 238 LBS | DIASTOLIC BLOOD PRESSURE: 72 MMHG | HEART RATE: 53 BPM | BODY MASS INDEX: 32.23 KG/M2 | HEIGHT: 72 IN | SYSTOLIC BLOOD PRESSURE: 115 MMHG

## 2023-07-12 DIAGNOSIS — R13.10 DYSPHAGIA, UNSPECIFIED TYPE: Primary | ICD-10-CM

## 2023-07-12 PROCEDURE — G8427 DOCREV CUR MEDS BY ELIG CLIN: HCPCS | Performed by: PSYCHIATRY & NEUROLOGY

## 2023-07-12 PROCEDURE — 3078F DIAST BP <80 MM HG: CPT | Performed by: PSYCHIATRY & NEUROLOGY

## 2023-07-12 PROCEDURE — 99214 OFFICE O/P EST MOD 30 MIN: CPT | Performed by: PSYCHIATRY & NEUROLOGY

## 2023-07-12 PROCEDURE — G8417 CALC BMI ABV UP PARAM F/U: HCPCS | Performed by: PSYCHIATRY & NEUROLOGY

## 2023-07-12 PROCEDURE — 3074F SYST BP LT 130 MM HG: CPT | Performed by: PSYCHIATRY & NEUROLOGY

## 2023-07-12 PROCEDURE — 1036F TOBACCO NON-USER: CPT | Performed by: PSYCHIATRY & NEUROLOGY

## 2023-07-12 PROCEDURE — 3017F COLORECTAL CA SCREEN DOC REV: CPT | Performed by: PSYCHIATRY & NEUROLOGY

## 2023-07-12 NOTE — PROGRESS NOTES
[facility]  [address] [facility], [address] [place] [address], [address]  Ph: [phone] or [phone]  FAX: [phone]    Chief Complaint: Oropharyngeal Dysphagia     Dear Preston Hernández MD     I had the pleasure of seeing your patient today in neurology consultation for his symptoms. As you would recall Jeff Landers is a 57 y.o. male. Patient has reports with a history of difficulty swallowing since November 2020. Patient has reported to have been seen by multiple specialist in the past and was referred to neurology. He has previously completed a swallow test which was unable to identify cause of difficulty. He admits his weight has remained stable once he found foods he could eat with minimal difficulty. Furthermore, he still experiences numbness and tingling in his fingers on the right hand thought to be caused by the injury. Patient reports his diagnosis was not consistent with myasthenia gravis at this time and saw the [address] [place] [address] for a second opinion. Denies urine and bowel incontinence. Patient currently takes Potassium Bromide 60 mg/5 mL, and he admits to symptomatic improvement with this medication. Today, the patient reports after recently being seen by the [address] [place] [address] on 7/12/23. The patient admits that his dysphagia has plateaued over the last few months. He continues to consume a pureed diet due to concerns of choking of solid foods. Patient admits to losing some weight previously, but his weight has remained stable for some time. He reports that symptoms appeared to worsen when not taking Pyridostigmine Bromide 60mg/5mL TID. Patient inquires about pursing speech therapy. Past Medical History:   Diagnosis Date    Arthritis     neck    BPH (benign prostatic hyperplasia)     Class 2 severe obesity due to excess calories with serious comorbidity in adult [facility])     Obesity (BMI 30.0-34. 9) [E66.9]    Dysphagia     Erectile dysfunction     Full

## 2023-07-19 ENCOUNTER — OFFICE VISIT (OUTPATIENT)
Dept: INTERNAL MEDICINE CLINIC | Age: 63
End: 2023-07-19
Payer: COMMERCIAL

## 2023-07-19 VITALS
WEIGHT: 240 LBS | RESPIRATION RATE: 10 BRPM | TEMPERATURE: 96.8 F | SYSTOLIC BLOOD PRESSURE: 130 MMHG | BODY MASS INDEX: 32.51 KG/M2 | DIASTOLIC BLOOD PRESSURE: 70 MMHG | OXYGEN SATURATION: 99 % | HEART RATE: 58 BPM | HEIGHT: 72 IN

## 2023-07-19 DIAGNOSIS — R13.12 OROPHARYNGEAL DYSPHAGIA: ICD-10-CM

## 2023-07-19 DIAGNOSIS — G70.00 MYASTHENIA GRAVIS (HCC): ICD-10-CM

## 2023-07-19 DIAGNOSIS — N40.0 BENIGN PROSTATIC HYPERPLASIA WITHOUT LOWER URINARY TRACT SYMPTOMS: ICD-10-CM

## 2023-07-19 DIAGNOSIS — F41.9 ANXIETY HYPERVENTILATION: ICD-10-CM

## 2023-07-19 DIAGNOSIS — M47.9 SPONDYLOSIS: Primary | ICD-10-CM

## 2023-07-19 DIAGNOSIS — F45.8 ANXIETY HYPERVENTILATION: ICD-10-CM

## 2023-07-19 DIAGNOSIS — J30.1 SEASONAL ALLERGIC RHINITIS DUE TO POLLEN: ICD-10-CM

## 2023-07-19 DIAGNOSIS — Z98.890 HISTORY OF ESOPHAGEAL DILATATION: ICD-10-CM

## 2023-07-19 DIAGNOSIS — E78.2 MIXED HYPERLIPIDEMIA: ICD-10-CM

## 2023-07-19 DIAGNOSIS — G47.33 OSA ON CPAP: ICD-10-CM

## 2023-07-19 DIAGNOSIS — I10 ESSENTIAL HYPERTENSION: ICD-10-CM

## 2023-07-19 DIAGNOSIS — M67.919 TENDINOPATHY OF ROTATOR CUFF, UNSPECIFIED LATERALITY: ICD-10-CM

## 2023-07-19 DIAGNOSIS — K21.9 GASTROESOPHAGEAL REFLUX DISEASE WITHOUT ESOPHAGITIS: ICD-10-CM

## 2023-07-19 DIAGNOSIS — N52.01 ERECTILE DYSFUNCTION DUE TO ARTERIAL INSUFFICIENCY: ICD-10-CM

## 2023-07-19 DIAGNOSIS — Z99.89 OSA ON CPAP: ICD-10-CM

## 2023-07-19 PROBLEM — M50.90 DISORDER OF INTERVERTEBRAL DISC OF CERVICAL SPINE: Status: RESOLVED | Noted: 2021-09-07 | Resolved: 2023-07-19

## 2023-07-19 PROBLEM — N18.30 STAGE 3 CHRONIC KIDNEY DISEASE (HCC): Status: RESOLVED | Noted: 2021-09-07 | Resolved: 2023-07-19

## 2023-07-19 PROCEDURE — 1036F TOBACCO NON-USER: CPT | Performed by: FAMILY MEDICINE

## 2023-07-19 PROCEDURE — 99214 OFFICE O/P EST MOD 30 MIN: CPT | Performed by: FAMILY MEDICINE

## 2023-07-19 PROCEDURE — G8417 CALC BMI ABV UP PARAM F/U: HCPCS | Performed by: FAMILY MEDICINE

## 2023-07-19 PROCEDURE — 3078F DIAST BP <80 MM HG: CPT | Performed by: FAMILY MEDICINE

## 2023-07-19 PROCEDURE — G8427 DOCREV CUR MEDS BY ELIG CLIN: HCPCS | Performed by: FAMILY MEDICINE

## 2023-07-19 PROCEDURE — 3017F COLORECTAL CA SCREEN DOC REV: CPT | Performed by: FAMILY MEDICINE

## 2023-07-19 PROCEDURE — 3075F SYST BP GE 130 - 139MM HG: CPT | Performed by: FAMILY MEDICINE

## 2023-07-19 RX ORDER — CYCLOBENZAPRINE HCL 5 MG
5 TABLET ORAL 2 TIMES DAILY PRN
Qty: 30 TABLET | Refills: 0 | Status: SHIPPED | OUTPATIENT
Start: 2023-07-19

## 2023-07-19 ASSESSMENT — ENCOUNTER SYMPTOMS
EYES NEGATIVE: 1
GASTROINTESTINAL NEGATIVE: 1
RESPIRATORY NEGATIVE: 1
BACK PAIN: 1
ALLERGIC/IMMUNOLOGIC NEGATIVE: 1

## 2023-07-19 NOTE — PROGRESS NOTES
Subjective:      Patient ID: Jorge Price is a 57 y.o. male. Back Pain  This is a new problem. The current episode started 1 to 4 weeks ago. The problem occurs constantly. The problem has been gradually improving since onset. The pain is present in the lumbar spine. The quality of the pain is described as aching. The pain does not radiate. The pain is at a severity of 5/10. The pain is moderate. The pain is The same all the time. The symptoms are aggravated by position. Stiffness is present All day. He has tried heat, ice and bed rest for the symptoms. The treatment provided mild relief. Review of Systems   Constitutional: Negative. HENT: Negative. Eyes: Negative. Respiratory: Negative. Cardiovascular: Negative. Gastrointestinal: Negative. Endocrine: Negative. Musculoskeletal:  Positive for arthralgias, back pain and neck pain. Skin: Negative. Allergic/Immunologic: Negative. Neurological: Negative. Hematological: Negative. Psychiatric/Behavioral: Negative. Past family and social history unremarkable. Diagnosis Orders   1. Spondylosis  [facility]      2. Benign prostatic hyperplasia without lower urinary tract symptoms        3. Erectile dysfunction due to arterial insufficiency        4. Gastroesophageal reflux disease without esophagitis        5. Tendinopathy of rotator cuff, unspecified laterality        6. YUMIKO on CPAP        7. Anxiety hyperventilation        8. Seasonal allergic rhinitis due to pollen        9. Oropharyngeal dysphagia        10. Myasthenia gravis ([address])        11. Essential hypertension        12. Mixed hyperlipidemia        13. History of esophageal dilatation            Objective:   Physical Exam  Vitals and nursing note reviewed. Constitutional:       Appearance: He is well-developed. Comments: Sleep apnea on CPAP   HENT:      Head: Normocephalic and atraumatic.       Right Ear: External ear normal.      Left

## 2023-08-15 DIAGNOSIS — G70.00 MYASTHENIA GRAVIS (HCC): ICD-10-CM

## 2023-08-15 RX ORDER — PYRIDOSTIGMINE BROMIDE 60 MG/5ML
SOLUTION ORAL
Refills: 3 | OUTPATIENT
Start: 2023-08-15

## 2023-08-24 ENCOUNTER — HOSPITAL ENCOUNTER (OUTPATIENT)
Dept: SPEECH THERAPY | Age: 63
Setting detail: THERAPIES SERIES
Discharge: HOME | End: 2023-08-24
Payer: COMMERCIAL

## 2023-08-24 DIAGNOSIS — R13.12 OROPHARYNGEAL DYSPHAGIA: Primary | ICD-10-CM

## 2023-08-24 PROCEDURE — 92610 EVALUATE SWALLOWING FUNCTION: CPT

## 2023-08-24 NOTE — CONSULTS
[x] [address]  [phone]  F: [phone]     [facility] Swallow Evaluation       Date:  2023  Patient: Darrius Rider  : 1960  MRN: [id number]  Physician: Rosalinda Resendiz MD     Insurance: New Dev Diagnosis: R13.12 Oropharyngeal dysphagia  Rehab Codes: R13.12 Oropharyngeal dysphagia  Onset date: 2020    Next Dr's appt.: 10/17/2023    Subjective:   CC: Patient's primary complaint is difficulty swallowing and experiencing difficulty \"getting food down. \"   HPI: Patient arrives for skilled speech therapy on this date for skilled speech therapy evaluated. Patient is a 57 y.o. male who reports with a history of difficulty swallowing since 2020. Patient has reported to have been seen by multiple specialist in the past and was referred to neurology. He has previously completed a swallow test which was unable to identify cause of difficulty. He admits his weight has remained stable once he found foods he could eat with minimal difficulty. Patient reports his diagnosis was not consistent with myasthenia gravis at this time and saw the [address] [place] [address] for a second opinion. Patient reports after recently being seen by the [address] [place] [address] on 23. The patient admits that his dysphagia has plateaued over the last few months. He continues to consume a pureed diet due to concerns of choking of solid foods. Patient admits to losing some weight previously, but his weight has remained stable for some time. He reports that symptoms appeared to worsen when not taking Pyridostigmine Bromide 60mg/5mL TID. Patient inquires about pursing speech therapy.         PMHx: [] Unremarkable [] Diabetes [x] HTN  [] Pacemaker   [] MI/Heart Problems [] Cancer [x] Arthritis [] Other:              [x] Refer to full medical chart  In EPIC     Comorbidities:   [] Obesity [] Dialysis  [] N/A   [] Asthma/COPD

## 2023-10-17 ENCOUNTER — OFFICE VISIT (OUTPATIENT)
Dept: NEUROLOGY | Age: 63
End: 2023-10-17
Payer: COMMERCIAL

## 2023-10-17 VITALS
HEART RATE: 57 BPM | BODY MASS INDEX: 32.37 KG/M2 | HEIGHT: 72 IN | WEIGHT: 239 LBS | SYSTOLIC BLOOD PRESSURE: 127 MMHG | DIASTOLIC BLOOD PRESSURE: 75 MMHG

## 2023-10-17 DIAGNOSIS — G70.00 MYASTHENIA GRAVIS (HCC): Primary | ICD-10-CM

## 2023-10-17 DIAGNOSIS — R13.10 DYSPHAGIA, UNSPECIFIED TYPE: ICD-10-CM

## 2023-10-17 PROCEDURE — 3078F DIAST BP <80 MM HG: CPT | Performed by: PSYCHIATRY & NEUROLOGY

## 2023-10-17 PROCEDURE — 1036F TOBACCO NON-USER: CPT | Performed by: PSYCHIATRY & NEUROLOGY

## 2023-10-17 PROCEDURE — 3074F SYST BP LT 130 MM HG: CPT | Performed by: PSYCHIATRY & NEUROLOGY

## 2023-10-17 PROCEDURE — 99214 OFFICE O/P EST MOD 30 MIN: CPT | Performed by: PSYCHIATRY & NEUROLOGY

## 2023-10-17 PROCEDURE — G8417 CALC BMI ABV UP PARAM F/U: HCPCS | Performed by: PSYCHIATRY & NEUROLOGY

## 2023-10-17 PROCEDURE — G8484 FLU IMMUNIZE NO ADMIN: HCPCS | Performed by: PSYCHIATRY & NEUROLOGY

## 2023-10-17 PROCEDURE — G8427 DOCREV CUR MEDS BY ELIG CLIN: HCPCS | Performed by: PSYCHIATRY & NEUROLOGY

## 2023-10-17 PROCEDURE — 3017F COLORECTAL CA SCREEN DOC REV: CPT | Performed by: PSYCHIATRY & NEUROLOGY

## 2023-10-24 RX ORDER — OMEPRAZOLE 20 MG/1
CAPSULE, DELAYED RELEASE ORAL
Qty: 90 CAPSULE | Refills: 2 | Status: SHIPPED | OUTPATIENT
Start: 2023-10-24

## 2023-11-02 DIAGNOSIS — I10 ESSENTIAL HYPERTENSION: ICD-10-CM

## 2023-11-02 RX ORDER — PRAVASTATIN SODIUM 40 MG
40 TABLET ORAL DAILY
Qty: 90 TABLET | Refills: 1 | Status: SHIPPED | OUTPATIENT
Start: 2023-11-02

## 2023-11-02 RX ORDER — VALSARTAN AND HYDROCHLOROTHIAZIDE 160; 12.5 MG/1; MG/1
1 TABLET, FILM COATED ORAL DAILY
Qty: 90 TABLET | Refills: 1 | Status: SHIPPED | OUTPATIENT
Start: 2023-11-02

## 2023-11-02 RX ORDER — AMLODIPINE BESYLATE 10 MG/1
10 TABLET ORAL EVERY MORNING
Qty: 90 TABLET | Refills: 1 | Status: SHIPPED | OUTPATIENT
Start: 2023-11-02

## 2023-11-02 NOTE — TELEPHONE ENCOUNTER
Aide Chan is calling to request a refill on the following medication(s):    Medication Request:  Requested Prescriptions     Pending Prescriptions Disp Refills    pravastatin (PRAVACHOL) 40 MG tablet 90 tablet 1     Sig: Take 1 tablet by mouth daily    valsartan-hydroCHLOROthiazide (DIOVAN-HCT) 160-12.5 MG per tablet 90 tablet 1     Sig: Take 1 tablet by mouth daily    amLODIPine (NORVASC) 10 MG tablet 90 tablet 1     Sig: Take 1 tablet by mouth every morning       Last Visit Date (If Applicable):  7/90/6571    Next Visit Date:    11/20/2023

## 2023-11-20 ENCOUNTER — OFFICE VISIT (OUTPATIENT)
Dept: INTERNAL MEDICINE CLINIC | Age: 63
End: 2023-11-20
Payer: COMMERCIAL

## 2023-11-20 VITALS
HEIGHT: 72 IN | TEMPERATURE: 97 F | OXYGEN SATURATION: 99 % | BODY MASS INDEX: 31.83 KG/M2 | SYSTOLIC BLOOD PRESSURE: 122 MMHG | RESPIRATION RATE: 10 BRPM | DIASTOLIC BLOOD PRESSURE: 68 MMHG | WEIGHT: 235 LBS | HEART RATE: 62 BPM

## 2023-11-20 DIAGNOSIS — Z23 NEED FOR INFLUENZA VACCINATION: Primary | ICD-10-CM

## 2023-11-20 DIAGNOSIS — R13.12 OROPHARYNGEAL DYSPHAGIA: ICD-10-CM

## 2023-11-20 DIAGNOSIS — Z98.890 HISTORY OF ESOPHAGEAL DILATATION: ICD-10-CM

## 2023-11-20 DIAGNOSIS — G47.33 OSA ON CPAP: ICD-10-CM

## 2023-11-20 DIAGNOSIS — F41.9 ANXIETY HYPERVENTILATION: ICD-10-CM

## 2023-11-20 DIAGNOSIS — G70.00 MYASTHENIA GRAVIS (HCC): ICD-10-CM

## 2023-11-20 DIAGNOSIS — I10 ESSENTIAL HYPERTENSION: ICD-10-CM

## 2023-11-20 DIAGNOSIS — N52.01 ERECTILE DYSFUNCTION DUE TO ARTERIAL INSUFFICIENCY: ICD-10-CM

## 2023-11-20 DIAGNOSIS — J30.89 SEASONAL ALLERGIC RHINITIS DUE TO OTHER ALLERGIC TRIGGER: ICD-10-CM

## 2023-11-20 DIAGNOSIS — M47.9 SPONDYLOSIS: ICD-10-CM

## 2023-11-20 DIAGNOSIS — N40.0 BENIGN PROSTATIC HYPERPLASIA WITHOUT LOWER URINARY TRACT SYMPTOMS: ICD-10-CM

## 2023-11-20 DIAGNOSIS — E78.2 MIXED HYPERLIPIDEMIA: ICD-10-CM

## 2023-11-20 DIAGNOSIS — M67.919 TENDINOPATHY OF ROTATOR CUFF, UNSPECIFIED LATERALITY: ICD-10-CM

## 2023-11-20 DIAGNOSIS — F45.8 ANXIETY HYPERVENTILATION: ICD-10-CM

## 2023-11-20 DIAGNOSIS — K21.9 GASTROESOPHAGEAL REFLUX DISEASE WITHOUT ESOPHAGITIS: ICD-10-CM

## 2023-11-20 PROCEDURE — 99214 OFFICE O/P EST MOD 30 MIN: CPT | Performed by: FAMILY MEDICINE

## 2023-11-20 PROCEDURE — 90674 CCIIV4 VAC NO PRSV 0.5 ML IM: CPT | Performed by: FAMILY MEDICINE

## 2023-11-20 PROCEDURE — G8482 FLU IMMUNIZE ORDER/ADMIN: HCPCS | Performed by: FAMILY MEDICINE

## 2023-11-20 PROCEDURE — 3074F SYST BP LT 130 MM HG: CPT | Performed by: FAMILY MEDICINE

## 2023-11-20 PROCEDURE — G8417 CALC BMI ABV UP PARAM F/U: HCPCS | Performed by: FAMILY MEDICINE

## 2023-11-20 PROCEDURE — 3078F DIAST BP <80 MM HG: CPT | Performed by: FAMILY MEDICINE

## 2023-11-20 PROCEDURE — 3017F COLORECTAL CA SCREEN DOC REV: CPT | Performed by: FAMILY MEDICINE

## 2023-11-20 PROCEDURE — 90471 IMMUNIZATION ADMIN: CPT | Performed by: FAMILY MEDICINE

## 2023-11-20 PROCEDURE — 1036F TOBACCO NON-USER: CPT | Performed by: FAMILY MEDICINE

## 2023-11-20 PROCEDURE — G8427 DOCREV CUR MEDS BY ELIG CLIN: HCPCS | Performed by: FAMILY MEDICINE

## 2023-11-20 ASSESSMENT — ENCOUNTER SYMPTOMS
EYES NEGATIVE: 1
BACK PAIN: 1
GASTROINTESTINAL NEGATIVE: 1
RESPIRATORY NEGATIVE: 1
ALLERGIC/IMMUNOLOGIC NEGATIVE: 1

## 2023-11-20 NOTE — PROGRESS NOTES
Subjective:      Patient ID: Antonieta Tran is a 57 y.o. male. Hypertension  This is a chronic problem. The current episode started more than 1 month ago. The problem has been gradually improving since onset. The problem is controlled. Associated symptoms include anxiety. Risk factors for coronary artery disease include stress, male gender and dyslipidemia. Past treatments include calcium channel blockers. The current treatment provides moderate improvement. Compliance problems include psychosocial issues. Review of Systems   Constitutional: Negative. HENT: Negative. Eyes: Negative. Respiratory: Negative. Cardiovascular: Negative. Gastrointestinal: Negative. Endocrine: Negative. Musculoskeletal:  Positive for arthralgias and back pain. Skin: Negative. Allergic/Immunologic: Negative. Neurological: Negative. Hematological: Negative. Psychiatric/Behavioral:  Positive for dysphoric mood. The patient is nervous/anxious. Past family and social history unremarkable. Diagnosis Orders   1. Need for influenza vaccination  Influenza, FLUCELVAX, (age 10 mo+), IM, PF, 0.5 mL      2. Benign prostatic hyperplasia without lower urinary tract symptoms        3. Erectile dysfunction due to arterial insufficiency        4. Gastroesophageal reflux disease without esophagitis        5. Tendinopathy of rotator cuff, unspecified laterality        6. YUMIKO on CPAP        7. Anxiety hyperventilation        8. Seasonal allergic rhinitis due to other allergic trigger        9. Oropharyngeal dysphagia        10. Myasthenia gravis ([address])        11. Essential hypertension        12. Mixed hyperlipidemia        13. History of esophageal dilatation        14. Spondylosis              Objective:   Physical Exam  Vitals and nursing note reviewed. Constitutional:       Appearance: He is well-developed. Comments: Sleep apnea   HENT:      Head: Normocephalic and atraumatic.       Right Ear:

## 2023-11-30 ENCOUNTER — OFFICE VISIT (OUTPATIENT)
Dept: PULMONOLOGY | Age: 63
End: 2023-11-30
Payer: COMMERCIAL

## 2023-11-30 VITALS
HEART RATE: 65 BPM | RESPIRATION RATE: 16 BRPM | WEIGHT: 233 LBS | SYSTOLIC BLOOD PRESSURE: 115 MMHG | OXYGEN SATURATION: 100 % | BODY MASS INDEX: 31.56 KG/M2 | HEIGHT: 72 IN | DIASTOLIC BLOOD PRESSURE: 70 MMHG

## 2023-11-30 DIAGNOSIS — G47.33 OSA ON CPAP: Primary | ICD-10-CM

## 2023-11-30 DIAGNOSIS — E66.9 OBESITY (BMI 35.0-39.9 WITHOUT COMORBIDITY): ICD-10-CM

## 2023-11-30 DIAGNOSIS — I10 ESSENTIAL HYPERTENSION: ICD-10-CM

## 2023-11-30 PROCEDURE — G8427 DOCREV CUR MEDS BY ELIG CLIN: HCPCS | Performed by: INTERNAL MEDICINE

## 2023-11-30 PROCEDURE — G8482 FLU IMMUNIZE ORDER/ADMIN: HCPCS | Performed by: INTERNAL MEDICINE

## 2023-11-30 PROCEDURE — 3074F SYST BP LT 130 MM HG: CPT | Performed by: INTERNAL MEDICINE

## 2023-11-30 PROCEDURE — 99214 OFFICE O/P EST MOD 30 MIN: CPT | Performed by: INTERNAL MEDICINE

## 2023-11-30 PROCEDURE — G8417 CALC BMI ABV UP PARAM F/U: HCPCS | Performed by: INTERNAL MEDICINE

## 2023-11-30 PROCEDURE — 1036F TOBACCO NON-USER: CPT | Performed by: INTERNAL MEDICINE

## 2023-11-30 PROCEDURE — 3078F DIAST BP <80 MM HG: CPT | Performed by: INTERNAL MEDICINE

## 2023-11-30 PROCEDURE — 3017F COLORECTAL CA SCREEN DOC REV: CPT | Performed by: INTERNAL MEDICINE

## 2023-11-30 ASSESSMENT — SLEEP AND FATIGUE QUESTIONNAIRES
HOW LIKELY ARE YOU TO NOD OFF OR FALL ASLEEP IN A CAR, WHILE STOPPED FOR A FEW MINUTES IN TRAFFIC: 0
HOW LIKELY ARE YOU TO NOD OFF OR FALL ASLEEP WHILE SITTING AND TALKING TO SOMEONE: 0
HOW LIKELY ARE YOU TO NOD OFF OR FALL ASLEEP WHILE LYING DOWN TO REST IN THE AFTERNOON WHEN CIRCUMSTANCES PERMIT: 1
HOW LIKELY ARE YOU TO NOD OFF OR FALL ASLEEP WHEN YOU ARE A PASSENGER IN A CAR FOR AN HOUR WITHOUT A BREAK: 1
HOW LIKELY ARE YOU TO NOD OFF OR FALL ASLEEP WHILE SITTING QUIETLY AFTER LUNCH WITHOUT ALCOHOL: 2
ESS TOTAL SCORE: 5
HOW LIKELY ARE YOU TO NOD OFF OR FALL ASLEEP WHILE SITTING AND READING: 1
HOW LIKELY ARE YOU TO NOD OFF OR FALL ASLEEP WHILE WATCHING TV: 0
HOW LIKELY ARE YOU TO NOD OFF OR FALL ASLEEP WHILE SITTING INACTIVE IN A PUBLIC PLACE: 0

## 2023-11-30 NOTE — PROGRESS NOTES
Never     Prior to Admission medications    Medication Sig Start Date End Date Taking?  Authorizing Provider   pravastatin (PRAVACHOL) 40 MG tablet Take 1 tablet by mouth daily 11/2/23  Yes Cande Woodruff MD   valsartan-hydroCHLOROthiazide (DIOVAN-HCT) 160-12.5 MG per tablet Take 1 tablet by mouth daily 11/2/23  Yes Cande Woodruff MD   amLODIPine (NORVASC) 10 MG tablet Take 1 tablet by mouth every morning 11/2/23  Yes Cande Woodruff MD   omeprazole (PRILOSEC) 20 MG delayed release capsule TAKE 1 CAPSULE BY MOUTH EVERY DAY AT NIGHT 10/24/23  Yes Cande Woodruff MD   cyclobenzaprine (FLEXERIL) 5 MG tablet Take 1 tablet by mouth 2 times daily as needed for Muscle spasms 7/19/23  Yes Cande Woodruff MD   loratadine (CLARITIN) 10 MG tablet Take 1 tablet by mouth daily 4/13/23  Yes Cande Woodruff MD   azelastine (ASTELIN) 0.1 % nasal spray 1 spray by Nasal route 2 times daily Use in each nostril as directed 4/13/23  Yes Cande Woodruff MD   dorzolamide (TRUSOPT) 2 % ophthalmic solution  1/16/23  Yes Provider, MD Jeanmarie   erythromycin (ROMYCIN) 5 MG/GM ophthalmic ointment  1/3/23  Yes Provider, MD Jeanmarie   latanoprost (XALATAN) 0.005 % ophthalmic solution  1/16/23  Yes Sophy, MD Jeanmarie   moxifloxacin (VIGAMOX) 0.5 % ophthalmic solution  11/16/22  Yes Provider, MD Jeanmarie   prednisoLONE acetate (PRED FORTE) 1 % ophthalmic suspension  12/13/22  Yes Provider, Historical, MD   potassium chloride (KLOR-CON M) 20 MEQ extended release tablet TAKE ONE TABLET BY MOUTH EVERY DAY 3/15/21  Yes Cande Woodruff MD   spironolactone (ALDACTONE) 50 MG tablet TAKE ONE TABLET BY MOUTH EVERY DAY 11/2/20  Yes Cande Woodruff MD   fluticasone (FLONASE) 50 MCG/ACT nasal spray 1 spray by Each Nostril route daily  Patient taking differently: 1 spray by Each Nostril route daily Indications: Patient using prn 7/17/20  Yes Cande Woodruff MD   tamsulosin (FLOMAX) 0.4 MG capsule Take 1 capsule by mouth daily   Yes Natanael Francois MD

## 2023-12-26 ENCOUNTER — TELEPHONE (OUTPATIENT)
Dept: INTERNAL MEDICINE CLINIC | Age: 63
End: 2023-12-26

## 2023-12-26 ENCOUNTER — APPOINTMENT (OUTPATIENT)
Dept: GENERAL RADIOLOGY | Age: 63
End: 2023-12-26
Payer: MEDICARE

## 2023-12-26 ENCOUNTER — HOSPITAL ENCOUNTER (EMERGENCY)
Age: 63
Discharge: HOME | End: 2023-12-26
Attending: STUDENT IN AN ORGANIZED HEALTH CARE EDUCATION/TRAINING PROGRAM
Payer: MEDICARE

## 2023-12-26 VITALS
HEIGHT: 72 IN | WEIGHT: 235 LBS | DIASTOLIC BLOOD PRESSURE: 70 MMHG | HEART RATE: 75 BPM | RESPIRATION RATE: 14 BRPM | BODY MASS INDEX: 31.83 KG/M2 | SYSTOLIC BLOOD PRESSURE: 115 MMHG | OXYGEN SATURATION: 100 % | TEMPERATURE: 98.3 F

## 2023-12-26 DIAGNOSIS — K59.00 CONSTIPATION, UNSPECIFIED CONSTIPATION TYPE: Primary | ICD-10-CM

## 2023-12-26 PROCEDURE — 74018 RADEX ABDOMEN 1 VIEW: CPT

## 2023-12-26 PROCEDURE — 99283 EMERGENCY DEPT VISIT LOW MDM: CPT

## 2023-12-26 PROCEDURE — 6370000000 HC RX 637 (ALT 250 FOR IP): Performed by: STUDENT IN AN ORGANIZED HEALTH CARE EDUCATION/TRAINING PROGRAM

## 2023-12-26 RX ORDER — POLYETHYLENE GLYCOL 3350 17 G/17G
17 POWDER, FOR SOLUTION ORAL DAILY
Qty: 1530 G | Refills: 1 | Status: SHIPPED | OUTPATIENT
Start: 2023-12-26 | End: 2024-06-23

## 2023-12-26 RX ORDER — DOCUSATE SODIUM 100 MG/1
100 CAPSULE, LIQUID FILLED ORAL ONCE
Status: DISCONTINUED | OUTPATIENT
Start: 2023-12-26 | End: 2023-12-26 | Stop reason: HOSPADM

## 2023-12-26 RX ORDER — POLYETHYLENE GLYCOL 3350 17 G/17G
17 POWDER, FOR SOLUTION ORAL ONCE
Status: COMPLETED | OUTPATIENT
Start: 2023-12-26 | End: 2023-12-26

## 2023-12-26 RX ORDER — ENEMA 19; 7 G/133ML; G/133ML
1 ENEMA RECTAL
Refills: 0 | COMMUNITY
Start: 2023-12-26 | End: 2023-12-26

## 2023-12-26 RX ORDER — DOCUSATE SODIUM 50 MG/5ML
100 LIQUID ORAL DAILY
Qty: 300 ML | Refills: 0 | Status: SHIPPED | OUTPATIENT
Start: 2023-12-26

## 2023-12-26 RX ORDER — LIDOCAINE HYDROCHLORIDE 20 MG/ML
20 JELLY TOPICAL PRN
Status: DISCONTINUED | OUTPATIENT
Start: 2023-12-26 | End: 2023-12-26 | Stop reason: HOSPADM

## 2023-12-26 RX ORDER — DOCUSATE SODIUM 100 MG/1
100 CAPSULE, LIQUID FILLED ORAL 2 TIMES DAILY
Qty: 60 CAPSULE | Refills: 0 | Status: SHIPPED | OUTPATIENT
Start: 2023-12-26 | End: 2023-12-26

## 2023-12-26 RX ADMIN — POLYETHYLENE GLYCOL 3350 17 G: 17 POWDER, FOR SOLUTION ORAL at 03:20

## 2023-12-26 RX ADMIN — LIDOCAINE HYDROCHLORIDE 20 ML: 20 JELLY TOPICAL at 03:05

## 2023-12-26 RX ADMIN — MAGNESIUM HYDROXIDE 30 ML: 400 SUSPENSION ORAL at 03:04

## 2023-12-26 NOTE — TELEPHONE ENCOUNTER
Patient is having constipation x 1 day and went to ER this AM for this and he was given laxatives but it has not helped. She is wondering if she should give him a fleets enema. Please advise.

## 2023-12-26 NOTE — TELEPHONE ENCOUNTER
I called the patient and left message in regards to him being established with [facility] gastroenterology.

## 2023-12-26 NOTE — TELEPHONE ENCOUNTER
Review of the record shows that he is already established with [facility].   He is advised to contact that office

## 2023-12-30 NOTE — ED PROVIDER NOTES
[facility] EMERGENCY DEPARTMENT ENCOUNTER      Pt Name: Bobo Hutton  MRN: [id number]  Birthdate 1960  Date of evaluation: 12/30/23    CHIEF COMPLAINT       Chief Complaint   Patient presents with    Constipation     Last BM was Friday        HISTORY OF PRESENT ILLNESS   Bobo Hutton is a 63 y.o. male who presents with constipation.  States it has been couple days since last bowel movement which is atypical.  Denies any abdominal pain or nausea or vomiting.  Concerned that he may be impacted.    PASTMEDICAL HISTORY     Past Medical History:   Diagnosis Date    Arthritis     neck    BPH (benign prostatic hyperplasia)     Class 2 severe obesity due to excess calories with serious comorbidity in adult ([facility])     Obesity (BMI 30.0-34.9) [E66.9]    Dysphagia     Erectile dysfunction     Full dentures     GERD (gastroesophageal reflux disease)     Hiatal hernia     Hyperlipidemia     Hypertension     YUMIKO on CPAP     PONV (postoperative nausea and vomiting)     nauseated after last surgery    Unspecified sleep apnea     cpap nightly     Past Problem List  Patient Active Problem List   Diagnosis Code    BPH (benign prostatic hyperplasia) N40.0    Erectile dysfunction N52.9    GERD (gastroesophageal reflux disease) K21.9    Tendinopathy of rotator cuff M67.919    YUMIKO on CPAP G47.33    Anxiety hyperventilation F41.9, F45.8    Seasonal allergic rhinitis J30.2    Oropharyngeal dysphagia R13.12    Myasthenia gravis ([facility]) G70.00    Essential hypertension I10    Mixed hyperlipidemia E78.2    History of esophageal dilatation Z98.890    Spondylosis M47.9       SURGICAL HISTORY       Past Surgical History:   Procedure Laterality Date    COLONOSCOPY      ENDOSCOPY, COLON, DIAGNOSTIC      ESOPHAGEAL MOTILITY STUDY N/A 03/17/2021    ESOPHAGEAL MOTILITY STUDY performed by Sheryl Lewis MD at [facility]    EYE SURGERY Left 11/2022    PENIS SURGERY N/A 07/16/2021    CYSTO  FULGURATION  EXCISION PENILE WARTS performed by Freddy BRAN  for Constipation, Disp-769 mL, R-0Normal      polyethylene glycol (GLYCOLAX) 17 GM/SCOOP powder Take 17 g by mouth daily, Disp-1530 g, R-1Normal      docusate sodium (COLACE) 150 MG/15ML liquid Take 10 mLs by mouth daily, Disp-300 mL, R-0Normal           PHYSICIAN CONSULTS ORDERED THIS ENCOUNTER:  None    ED Course Notes From Epic Narrator:         CRITICAL CARE:   0    PROCEDURES:  none    FINAL IMPRESSION      1.  Constipation, unspecified constipation type          DISPOSITION/PLAN   DISPOSITION Decision To Discharge 12/26/2023 02:39:26 AM      OUTPATIENT FOLLOW UP THE PATIENT:  César Harding MD  [address] [facility] [address] [place] [address]  [phone]    Schedule an appointment as soon as possible for a visit in 1 week  As needed      DISCHARGE MEDICATIONS:  Discharge Medication List as of 12/26/2023  3:18 AM        START taking these medications    Details   magnesium hydroxide (MILK OF MAGNESIA) 400 MG/5ML suspension Take 15 mLs by mouth daily as needed for Constipation, Disp-769 mL, R-0Normal      polyethylene glycol (GLYCOLAX) 17 GM/SCOOP powder Take 17 g by mouth daily, Disp-1530 g, R-1Normal      docusate sodium (COLACE) 150 MG/15ML liquid Take 10 mLs by mouth daily, Disp-300 mL, R-0Normal             Miguelina Hernandez DO  EmergencyMedicine Attending    (Please note that portions of this note were completed with a voice recognition program.  Efforts were made to edit the dictations but occasionally words are mis-transcribed.)     Miguelina Hernandez DO  12/30/23 1096

## 2024-01-19 ENCOUNTER — TELEPHONE (OUTPATIENT)
Dept: NEUROLOGY | Age: 64
End: 2024-01-19

## 2024-01-19 DIAGNOSIS — G70.00 MYASTHENIA GRAVIS (HCC): ICD-10-CM

## 2024-01-19 DIAGNOSIS — R13.10 DYSPHAGIA, UNSPECIFIED TYPE: Primary | ICD-10-CM

## 2024-01-19 NOTE — TELEPHONE ENCOUNTER
----- Message from Jeramy Perdomo MD sent at 1/18/2024  8:00 AM EST -----  Regarding: RE: Order for a swallow study  Can we please put an order in. Thanks   ----- Message -----  From: Neelam Farmer SLP  Sent: 1/17/2024   2:47 PM EST  To: Jeramy Perdomo MD  Subject: Order for a swallow study                        Good afternoon Dr. Perdomo,     I am reaching out to you regarding Mr. Osuna. He was referred for outpatient skilled speech therapy with a diagnosis for dysphagia. I completed a bedside swallow evaluation with him in August and referred him for a videofluoroscopic swallow study (VFSS) or a MBSS before scheduling him for any further speech therapy treatments. This is to determine any anatomical and/or physiological deficits in his swallow function. He has still not had a swallow study scheduled. Could you put in a order for this please?     Thank you and please let me know if you have any questions!    Kind regards,  Neelma Farmer M.A., CCC-SLP   Outpatient therapy services   [facility]

## 2024-01-23 ENCOUNTER — TELEPHONE (OUTPATIENT)
Dept: NEUROLOGY | Age: 64
End: 2024-01-23

## 2024-01-23 ENCOUNTER — OFFICE VISIT (OUTPATIENT)
Dept: NEUROLOGY | Age: 64
End: 2024-01-23
Payer: MEDICARE

## 2024-01-23 VITALS
DIASTOLIC BLOOD PRESSURE: 72 MMHG | SYSTOLIC BLOOD PRESSURE: 119 MMHG | BODY MASS INDEX: 31.56 KG/M2 | HEART RATE: 60 BPM | WEIGHT: 233 LBS | HEIGHT: 72 IN

## 2024-01-23 DIAGNOSIS — R13.19 ESOPHAGEAL DYSPHAGIA: ICD-10-CM

## 2024-01-23 DIAGNOSIS — R13.10 DYSPHAGIA, UNSPECIFIED TYPE: Primary | ICD-10-CM

## 2024-01-23 DIAGNOSIS — G70.00 MYASTHENIA GRAVIS (HCC): ICD-10-CM

## 2024-01-23 PROCEDURE — 1036F TOBACCO NON-USER: CPT | Performed by: PSYCHIATRY & NEUROLOGY

## 2024-01-23 PROCEDURE — 3017F COLORECTAL CA SCREEN DOC REV: CPT | Performed by: PSYCHIATRY & NEUROLOGY

## 2024-01-23 PROCEDURE — 3078F DIAST BP <80 MM HG: CPT | Performed by: PSYCHIATRY & NEUROLOGY

## 2024-01-23 PROCEDURE — 1123F ACP DISCUSS/DSCN MKR DOCD: CPT | Performed by: PSYCHIATRY & NEUROLOGY

## 2024-01-23 PROCEDURE — 3074F SYST BP LT 130 MM HG: CPT | Performed by: PSYCHIATRY & NEUROLOGY

## 2024-01-23 PROCEDURE — G8482 FLU IMMUNIZE ORDER/ADMIN: HCPCS | Performed by: PSYCHIATRY & NEUROLOGY

## 2024-01-23 PROCEDURE — G8417 CALC BMI ABV UP PARAM F/U: HCPCS | Performed by: PSYCHIATRY & NEUROLOGY

## 2024-01-23 PROCEDURE — 99214 OFFICE O/P EST MOD 30 MIN: CPT | Performed by: PSYCHIATRY & NEUROLOGY

## 2024-01-23 PROCEDURE — G8427 DOCREV CUR MEDS BY ELIG CLIN: HCPCS | Performed by: PSYCHIATRY & NEUROLOGY

## 2024-01-23 RX ORDER — BRIMONIDINE TARTRATE AND TIMOLOL MALEATE 2; 5 MG/ML; MG/ML
1 SOLUTION OPHTHALMIC EVERY 12 HOURS
COMMUNITY
Start: 2024-01-03

## 2024-01-23 NOTE — TELEPHONE ENCOUNTER
Dr. Perdomo saw Bobo in follow up today and asked that we consult with [facility] to verify if the muscle biopsy can be performed here in [place] and then sent to [facility] for analysis or do they prefer to do the biopsy there at [facility]?  I called and spoke with Sunil at [facility].   She will message Dr. Gasca and we should have an answer from him within 24-48 hours.   Dr. Perdomo advised would be rt. deltoid through general surgery if he can have it done here locally.

## 2024-01-23 NOTE — PROGRESS NOTES
\"LDLCALC\"  No results found for: \"LABVLDL\"  Lab Results   Component Value Date    LABA1C 5.2 04/13/2023     Lab Results   Component Value Date     04/13/2023     Lab Results   Component Value Date    [id number] 705 02/13/2014        Neurological work up:  CK on 8/4/2021 was within normal limits at 139 U/L  Myasthenia Gravis Panel performed on 8/4/2021 was within normal limits at 0.1 nmol/L  Myoglobin performed on 8/4/2021 was within normal limits at 34 ng/mL     MRI Brain W WO Contrast on 02/03/2021: No acute intracranial abnormality. Mild chronic microvascular disease within the periventricular white matter.  MRI Cervical Spine WO Contrast on 02/03/2021: Moderate-sized broad-based right paracentral disc protrusion at C5-6 in mild compression of the cervical cord on the right-side.  In addition, moderate central canal stenosis and moderate bilateral foraminal stenosis are identified at this level. Broad disc osteophyte complex at C6-C7 resulting in moderate central canal stenosis.  Severe left foraminal stenosis and moderate right foraminal  stenosis are additionally appreciated at this level. Small central disc protrusion at C4-C5 with associated mild right foraminal stenosis. Small central disc protrusion at C3-C4 with associated moderate left foraminal stenosis. Moderate left foraminal stenosis at C2-C3.     All of patient's labs were personally reviewed. All the imaging studies were personally reviewed and discussed with the patient.     Assessment Recommendations:    Dysphagia etiology unclear     The patient continues to experience difficulty swallowing unchanged from the last visit. He denies any choking episodes or double vision.  EMG performed in September 2023 at [facility] showed evidence of myopathy with mild muscle membrane irritability and biopsy of the right deltoid muscle was recommended.    He was not able to follow-up with [facility] for right deltoid muscle biopsy

## 2024-01-23 NOTE — TELEPHONE ENCOUNTER
I received a call from Jessica with Dr. Gasca's office.  Dr. Matthews recommends that  he the muscle bx. done there at [facility] by the neurosurgery team. Jessica said she will contact the scheduling department and ask them to call the pt. but the pt. can call [facility] and ask for neurosurgery dept. and request to schedule.   I spoke with Bobo and provided him with the number for neurosurgery there as [phone]

## 2024-03-20 ENCOUNTER — OFFICE VISIT (OUTPATIENT)
Dept: INTERNAL MEDICINE CLINIC | Age: 64
End: 2024-03-20
Payer: MEDICARE

## 2024-03-20 VITALS
HEIGHT: 72 IN | RESPIRATION RATE: 7 BRPM | SYSTOLIC BLOOD PRESSURE: 128 MMHG | WEIGHT: 240 LBS | DIASTOLIC BLOOD PRESSURE: 76 MMHG | HEART RATE: 57 BPM | OXYGEN SATURATION: 98 % | BODY MASS INDEX: 32.51 KG/M2 | TEMPERATURE: 97.5 F

## 2024-03-20 DIAGNOSIS — I10 ESSENTIAL HYPERTENSION: ICD-10-CM

## 2024-03-20 DIAGNOSIS — K21.9 GASTROESOPHAGEAL REFLUX DISEASE WITHOUT ESOPHAGITIS: ICD-10-CM

## 2024-03-20 DIAGNOSIS — E78.2 MIXED HYPERLIPIDEMIA: ICD-10-CM

## 2024-03-20 DIAGNOSIS — G70.00 MYASTHENIA GRAVIS (HCC): ICD-10-CM

## 2024-03-20 DIAGNOSIS — G47.33 OSA ON CPAP: ICD-10-CM

## 2024-03-20 DIAGNOSIS — Z98.890 HISTORY OF ESOPHAGEAL DILATATION: ICD-10-CM

## 2024-03-20 DIAGNOSIS — R13.12 OROPHARYNGEAL DYSPHAGIA: ICD-10-CM

## 2024-03-20 DIAGNOSIS — M67.919 TENDINOPATHY OF ROTATOR CUFF, UNSPECIFIED LATERALITY: ICD-10-CM

## 2024-03-20 DIAGNOSIS — N40.0 BENIGN PROSTATIC HYPERPLASIA WITHOUT LOWER URINARY TRACT SYMPTOMS: ICD-10-CM

## 2024-03-20 DIAGNOSIS — F45.8 ANXIETY HYPERVENTILATION: ICD-10-CM

## 2024-03-20 DIAGNOSIS — M47.9 SPONDYLOSIS: ICD-10-CM

## 2024-03-20 DIAGNOSIS — Z00.00 WELCOME TO MEDICARE PREVENTIVE VISIT: Primary | ICD-10-CM

## 2024-03-20 DIAGNOSIS — F41.9 ANXIETY HYPERVENTILATION: ICD-10-CM

## 2024-03-20 DIAGNOSIS — J30.89 SEASONAL ALLERGIC RHINITIS DUE TO OTHER ALLERGIC TRIGGER: ICD-10-CM

## 2024-03-20 DIAGNOSIS — N52.01 ERECTILE DYSFUNCTION DUE TO ARTERIAL INSUFFICIENCY: ICD-10-CM

## 2024-03-20 PROCEDURE — 3078F DIAST BP <80 MM HG: CPT | Performed by: FAMILY MEDICINE

## 2024-03-20 PROCEDURE — 1123F ACP DISCUSS/DSCN MKR DOCD: CPT | Performed by: FAMILY MEDICINE

## 2024-03-20 PROCEDURE — 3017F COLORECTAL CA SCREEN DOC REV: CPT | Performed by: FAMILY MEDICINE

## 2024-03-20 PROCEDURE — 3074F SYST BP LT 130 MM HG: CPT | Performed by: FAMILY MEDICINE

## 2024-03-20 PROCEDURE — G0402 INITIAL PREVENTIVE EXAM: HCPCS | Performed by: FAMILY MEDICINE

## 2024-03-20 ASSESSMENT — PATIENT HEALTH QUESTIONNAIRE - PHQ9
2. FEELING DOWN, DEPRESSED OR HOPELESS: NOT AT ALL
SUM OF ALL RESPONSES TO PHQ QUESTIONS 1-9: 0
1. LITTLE INTEREST OR PLEASURE IN DOING THINGS: NOT AT ALL
SUM OF ALL RESPONSES TO PHQ QUESTIONS 1-9: 0
SUM OF ALL RESPONSES TO PHQ9 QUESTIONS 1 & 2: 0

## 2024-03-20 ASSESSMENT — LIFESTYLE VARIABLES
HOW OFTEN DO YOU HAVE A DRINK CONTAINING ALCOHOL: NEVER
HOW MANY STANDARD DRINKS CONTAINING ALCOHOL DO YOU HAVE ON A TYPICAL DAY: PATIENT DOES NOT DRINK

## 2024-03-20 NOTE — PATIENT INSTRUCTIONS
information.      Personalized Preventive Plan for Bobo Hutton - 3/20/2024  Medicare offers a range of preventive health benefits. Some of the tests and screenings are paid in full while other may be subject to a deductible, co-insurance, and/or copay.    Some of these benefits include a comprehensive review of your medical history including lifestyle, illnesses that may run in your family, and various assessments and screenings as appropriate.    After reviewing your medical record and screening and assessments performed today your provider may have ordered immunizations, labs, imaging, and/or referrals for you.  A list of these orders (if applicable) as well as your Preventive Care list are included within your After Visit Summary for your review.    Other Preventive Recommendations:    A preventive eye exam performed by an eye specialist is recommended every 1-2 years to screen for glaucoma; cataracts, macular degeneration, and other eye disorders.  A preventive dental visit is recommended every 6 months.  Try to get at least 150 minutes of exercise per week or 10,000 steps per day on a pedometer .  Order or download the FREE \"Exercise & Physical Activity: Your Everyday Guide\" from [employer]. Call 1-717.915.4061 or search [employer] online.  You need 7443-8099 mg of calcium and 4332-9574 IU of vitamin D per day. It is possible to meet your calcium requirement with diet alone, but a vitamin D supplement is usually necessary to meet this goal.  When exposed to the sun, use a sunscreen that protects against both UVA and UVB radiation with an SPF of 30 or greater. Reapply every 2 to 3 hours or after sweating, drying off with a towel, or swimming.  Always wear a seat belt when traveling in a car. Always wear a helmet when riding a bicycle or motorcycle.

## 2024-03-20 NOTE — TELEPHONE ENCOUNTER
Bobo Hutton is calling to request a refill on the following medication(s):    Medication Request:  Requested Prescriptions     Pending Prescriptions Disp Refills    loratadine (CLARITIN) 10 MG tablet [Pharmacy Med Name: LORATADINE 10 MG TABLET] 30 tablet 5     Sig: TAKE 1 TABLET BY MOUTH EVERY DAY       Last Visit Date (If Applicable):  3/20/2024    Next Visit Date:    9/20/2024      Last refill 4/13/23. Prescription pending

## 2024-03-20 NOTE — PROGRESS NOTES
and biopsy at [facility].  He continues to consume puréed food that he is tolerating well.  No apparent sign of aspiration  Sleep apnea he is compliant with CPAP  Hemodynamically stable.  He is advised to continue Aldactone, Diovan HCT  GERD stable on proton pump inhibitor as needed  History of allergies allergic rhinitis.  Continue Flonase, nasal saline antihistamine  Constipation.  Continue Colace, more fruits and vegetables and over-the-counter fiber  Glaucoma.  He is on Pred forte, Vigamox, Xalatan, Romycin, Trusopt, Combigan  Recommendations for Preventive Services Due: see orders and patient instructions/AVS.  Recommended screening schedule for the next 5-10 years is provided to the patient in written form: see Patient Instructions/AVS.     No follow-ups on file.     Subjective       Patient's complete Health Risk Assessment and screening values have been reviewed and are found in Flowsheets. The following problems were reviewed today and where indicated follow up appointments were made and/or referrals ordered.    Positive Risk Factor Screenings with Interventions:       Cognitive:   Clock Drawing Test (CDT): Normal  Words recalled: 0 Words Recalled  Total Score: (!) 2  Total Score Interpretation: Abnormal Mini-Cog  Interventions:              Activity, Diet, and Weight:  On average, how many days per week do you engage in moderate to strenuous exercise (like a brisk walk)?: 5 days  On average, how many minutes do you engage in exercise at this level?: 90 min    Do you eat balanced/healthy meals regularly?: Yes    Body mass index is 32.55 kg/m². (!) Abnormal    Obesity Interventions:                 Safety:  Do you have non-slip mats or non-slip surfaces or shower bars or grab bars in your shower or bathtub?: (!) No  Interventions:       Advanced Directives:  Do you have a Living Will?: (!) No    Intervention:                       Objective   Vitals:    03/20/24 0816   BP: 128/76   Site: Left

## 2024-03-21 ENCOUNTER — HOSPITAL ENCOUNTER (OUTPATIENT)
Age: 64
Setting detail: SPECIMEN
Discharge: HOME | End: 2024-03-21

## 2024-03-21 DIAGNOSIS — I10 ESSENTIAL HYPERTENSION: ICD-10-CM

## 2024-03-21 DIAGNOSIS — N40.0 BENIGN PROSTATIC HYPERPLASIA WITHOUT LOWER URINARY TRACT SYMPTOMS: ICD-10-CM

## 2024-03-21 DIAGNOSIS — Z00.00 WELCOME TO MEDICARE PREVENTIVE VISIT: ICD-10-CM

## 2024-03-21 DIAGNOSIS — E78.2 MIXED HYPERLIPIDEMIA: ICD-10-CM

## 2024-03-21 DIAGNOSIS — R13.12 OROPHARYNGEAL DYSPHAGIA: ICD-10-CM

## 2024-03-21 LAB
ALBUMIN SERPL-MCNC: 4.4 G/DL (ref 3.5–5.2)
ALBUMIN/GLOB SERPL: 2 {RATIO} (ref 1–2.5)
ALP SERPL-CCNC: 98 U/L (ref 40–129)
ALT SERPL-CCNC: 19 U/L (ref 10–50)
ANION GAP SERPL CALCULATED.3IONS-SCNC: 9 MMOL/L (ref 9–16)
AST SERPL-CCNC: 22 U/L (ref 10–50)
BILIRUB SERPL-MCNC: 1 MG/DL (ref 0–1.2)
BILIRUB UR QL STRIP: NEGATIVE
BUN SERPL-MCNC: 10 MG/DL (ref 8–23)
CALCIUM SERPL-MCNC: 9.2 MG/DL (ref 8.6–10.4)
CHLORIDE SERPL-SCNC: 106 MMOL/L (ref 98–107)
CHOLEST SERPL-MCNC: 124 MG/DL (ref 0–199)
CHOLESTEROL/HDL RATIO: 3
CLARITY UR: CLEAR
CO2 SERPL-SCNC: 27 MMOL/L (ref 20–31)
COLOR UR: YELLOW
COMMENT: NORMAL
CREAT SERPL-MCNC: 0.9 MG/DL (ref 0.7–1.2)
ERYTHROCYTE [DISTWIDTH] IN BLOOD BY AUTOMATED COUNT: 13.8 % (ref 11.8–14.4)
GFR SERPL CREATININE-BSD FRML MDRD: >60 ML/MIN/1.73M2
GLUCOSE SERPL-MCNC: 91 MG/DL (ref 74–99)
GLUCOSE UR STRIP-MCNC: NEGATIVE MG/DL
HCT VFR BLD AUTO: 39.6 % (ref 40.7–50.3)
HDLC SERPL-MCNC: 49 MG/DL
HGB BLD-MCNC: 13.2 G/DL (ref 13–17)
HGB UR QL STRIP.AUTO: NEGATIVE
KETONES UR STRIP-MCNC: NEGATIVE MG/DL
LDLC SERPL CALC-MCNC: 64 MG/DL (ref 0–100)
LEUKOCYTE ESTERASE UR QL STRIP: NEGATIVE
MCH RBC QN AUTO: 29.1 PG (ref 25.2–33.5)
MCHC RBC AUTO-ENTMCNC: 33.3 G/DL (ref 28.4–34.8)
MCV RBC AUTO: 87.2 FL (ref 82.6–102.9)
NITRITE UR QL STRIP: NEGATIVE
NRBC BLD-RTO: 0 PER 100 WBC
PH UR STRIP: 8 [PH] (ref 5–8)
PLATELET # BLD AUTO: 200 K/UL (ref 138–453)
PMV BLD AUTO: 10.3 FL (ref 8.1–13.5)
POTASSIUM SERPL-SCNC: 3.8 MMOL/L (ref 3.7–5.3)
PROT SERPL-MCNC: 7.1 G/DL (ref 6.6–8.7)
PROT UR STRIP-MCNC: NEGATIVE MG/DL
RBC # BLD AUTO: 4.54 M/UL (ref 4.21–5.77)
SODIUM SERPL-SCNC: 142 MMOL/L (ref 136–145)
SP GR UR STRIP: 1.01 (ref 1–1.03)
TRIGL SERPL-MCNC: 58 MG/DL
UROBILINOGEN UR STRIP-ACNC: NORMAL EU/DL (ref 0–1)
VLDLC SERPL CALC-MCNC: 12 MG/DL
WBC OTHER # BLD: 4.1 K/UL (ref 3.5–11.3)

## 2024-03-21 RX ORDER — LORATADINE 10 MG/1
10 TABLET ORAL DAILY
Qty: 90 TABLET | Refills: 1 | Status: SHIPPED | OUTPATIENT
Start: 2024-03-21

## 2024-04-09 RX ORDER — VALSARTAN AND HYDROCHLOROTHIAZIDE 160; 12.5 MG/1; MG/1
1 TABLET, FILM COATED ORAL DAILY
Qty: 90 TABLET | Refills: 1 | Status: SHIPPED | OUTPATIENT
Start: 2024-04-09

## 2024-04-09 RX ORDER — PRAVASTATIN SODIUM 40 MG
40 TABLET ORAL DAILY
Qty: 90 TABLET | Refills: 1 | Status: SHIPPED | OUTPATIENT
Start: 2024-04-09

## 2024-04-09 NOTE — TELEPHONE ENCOUNTER
Bobo Hutton is calling to request a refill on the following medication(s):    Last Visit Date (If Applicable):  3/20/2024    Next Visit Date:    9/20/2024    Medication Request:  Requested Prescriptions     Pending Prescriptions Disp Refills    pravastatin (PRAVACHOL) 40 MG tablet 90 tablet 1     Sig: Take 1 tablet by mouth daily    valsartan-hydroCHLOROthiazide (DIOVAN-HCT) 160-12.5 MG per tablet 90 tablet 1     Sig: Take 1 tablet by mouth daily

## 2024-04-10 DIAGNOSIS — I10 ESSENTIAL HYPERTENSION: ICD-10-CM

## 2024-04-10 RX ORDER — AMLODIPINE BESYLATE 10 MG/1
10 TABLET ORAL EVERY MORNING
Qty: 90 TABLET | Refills: 1 | Status: SHIPPED | OUTPATIENT
Start: 2024-04-10

## 2024-04-10 NOTE — TELEPHONE ENCOUNTER
Bobo Hutton is calling to request a refill on the following medication(s):    Medication Request:  Requested Prescriptions     Pending Prescriptions Disp Refills    amLODIPine (NORVASC) 10 MG tablet 90 tablet 1     Sig: Take 1 tablet by mouth every morning       Last Visit Date (If Applicable):  3/20/2024    Next Visit Date:    9/20/2024

## 2024-05-22 NOTE — TELEPHONE ENCOUNTER
Bobo Hutton is calling to request a refill on the following medication(s):    Medication Request:  Requested Prescriptions     Pending Prescriptions Disp Refills    loratadine (CLARITIN) 10 MG tablet 90 tablet 1     Sig: Take 1 tablet by mouth daily    omeprazole (PRILOSEC) 20 MG delayed release capsule 90 capsule 1     Sig: TAKE 1 CAPSULE BY MOUTH EVERY DAY AT NIGHT       Last Visit Date (If Applicable):  3/20/2024    Next Visit Date:    9/20/2024

## 2024-05-23 RX ORDER — LORATADINE 10 MG/1
10 TABLET ORAL DAILY
Qty: 90 TABLET | Refills: 1 | Status: SHIPPED | OUTPATIENT
Start: 2024-05-23

## 2024-05-23 RX ORDER — OMEPRAZOLE 20 MG/1
CAPSULE, DELAYED RELEASE ORAL
Qty: 90 CAPSULE | Refills: 1 | Status: SHIPPED | OUTPATIENT
Start: 2024-05-23

## 2024-05-29 NOTE — PROGRESS NOTES
file   Other Topics Concern    Not on file   Social History Narrative    Not on file     Social Determinants of Health     Financial Resource Strain: Low Risk  (4/13/2023)    Overall Financial Resource Strain (CARDIA)     Difficulty of Paying Living Expenses: Not hard at all   Food Insecurity: Not on file (4/13/2023)   Transportation Needs: Unknown (4/13/2023)    PRAPARE - Transportation     Lack of Transportation (Medical): Not on file     Lack of Transportation (Non-Medical): No   Physical Activity: Sufficiently Active (3/20/2024)    Exercise Vital Sign     Days of Exercise per Week: 5 days     Minutes of Exercise per Session: 90 min   Stress: Not on file   Social Connections: Not on file   Intimate Partner Violence: Not on file   Housing Stability: Unknown (4/13/2023)    Housing Stability Vital Sign     Unable to Pay for Housing in the Last Year: Not on file     Number of Places Lived in the Last Year: Not on file     Unstable Housing in the Last Year: No       Review of Systems   Constitutional: Negative.    HENT: Negative.     Eyes: Negative.    Respiratory: Negative.     Cardiovascular: Negative.    Gastrointestinal: Negative.    Endocrine: Negative.    Genitourinary: Negative.    Musculoskeletal: Negative.    Skin: Negative.    Allergic/Immunologic: Negative.    Neurological: Negative.    Hematological: Negative.    Psychiatric/Behavioral: Negative.         Objective:       Physical Exam  General appearance - alert, well appearing, and in no distress  Mental status - alert, oriented to person, place, and time  Eyes - pupils equal and reactive, extraocular eye movements intact  Ears -not examined  Nose - normal and patent, no erythema, discharge or polyps  Mouth - mucous membranes moist, pharynx normal without lesions  Neck - supple, no significant adenopathy  Chest - clear to auscultation, no wheezes, rales or rhonchi, symmetric air entry  Heart -normal rate, regular rhythm, normal S1, S2, no murmurs, rubs,

## 2024-06-04 ENCOUNTER — OFFICE VISIT (OUTPATIENT)
Dept: PULMONOLOGY | Age: 64
End: 2024-06-04
Payer: MEDICARE

## 2024-06-04 VITALS
BODY MASS INDEX: 31.61 KG/M2 | DIASTOLIC BLOOD PRESSURE: 71 MMHG | HEIGHT: 72 IN | SYSTOLIC BLOOD PRESSURE: 118 MMHG | HEART RATE: 71 BPM | OXYGEN SATURATION: 98 % | RESPIRATION RATE: 17 BRPM | WEIGHT: 233.4 LBS

## 2024-06-04 DIAGNOSIS — E66.9 OBESITY (BMI 35.0-39.9 WITHOUT COMORBIDITY): ICD-10-CM

## 2024-06-04 DIAGNOSIS — G47.33 OSA ON CPAP: Primary | ICD-10-CM

## 2024-06-04 PROCEDURE — 3078F DIAST BP <80 MM HG: CPT | Performed by: NURSE PRACTITIONER

## 2024-06-04 PROCEDURE — G8427 DOCREV CUR MEDS BY ELIG CLIN: HCPCS | Performed by: NURSE PRACTITIONER

## 2024-06-04 PROCEDURE — 1123F ACP DISCUSS/DSCN MKR DOCD: CPT | Performed by: NURSE PRACTITIONER

## 2024-06-04 PROCEDURE — 99213 OFFICE O/P EST LOW 20 MIN: CPT | Performed by: NURSE PRACTITIONER

## 2024-06-04 PROCEDURE — 1036F TOBACCO NON-USER: CPT | Performed by: NURSE PRACTITIONER

## 2024-06-04 PROCEDURE — 3074F SYST BP LT 130 MM HG: CPT | Performed by: NURSE PRACTITIONER

## 2024-06-04 PROCEDURE — G8417 CALC BMI ABV UP PARAM F/U: HCPCS | Performed by: NURSE PRACTITIONER

## 2024-06-04 PROCEDURE — 3017F COLORECTAL CA SCREEN DOC REV: CPT | Performed by: NURSE PRACTITIONER

## 2024-06-04 ASSESSMENT — SLEEP AND FATIGUE QUESTIONNAIRES
HOW LIKELY ARE YOU TO NOD OFF OR FALL ASLEEP WHILE SITTING AND READING: MODERATE CHANCE OF DOZING
HOW LIKELY ARE YOU TO NOD OFF OR FALL ASLEEP WHILE WATCHING TV: MODERATE CHANCE OF DOZING
HOW LIKELY ARE YOU TO NOD OFF OR FALL ASLEEP WHILE SITTING INACTIVE IN A PUBLIC PLACE: WOULD NEVER DOZE
HOW LIKELY ARE YOU TO NOD OFF OR FALL ASLEEP WHILE LYING DOWN TO REST IN THE AFTERNOON WHEN CIRCUMSTANCES PERMIT: HIGH CHANCE OF DOZING
HOW LIKELY ARE YOU TO NOD OFF OR FALL ASLEEP WHILE SITTING QUIETLY AFTER LUNCH WITHOUT ALCOHOL: SLIGHT CHANCE OF DOZING
HOW LIKELY ARE YOU TO NOD OFF OR FALL ASLEEP WHILE SITTING AND TALKING TO SOMEONE: SLIGHT CHANCE OF DOZING
HOW LIKELY ARE YOU TO NOD OFF OR FALL ASLEEP WHEN YOU ARE A PASSENGER IN A CAR FOR AN HOUR WITHOUT A BREAK: WOULD NEVER DOZE
ESS TOTAL SCORE: 9
HOW LIKELY ARE YOU TO NOD OFF OR FALL ASLEEP IN A CAR, WHILE STOPPED FOR A FEW MINUTES IN TRAFFIC: WOULD NEVER DOZE

## 2024-06-04 ASSESSMENT — ENCOUNTER SYMPTOMS
RESPIRATORY NEGATIVE: 1
EYES NEGATIVE: 1
ALLERGIC/IMMUNOLOGIC NEGATIVE: 1
GASTROINTESTINAL NEGATIVE: 1

## 2024-06-05 ENCOUNTER — TELEPHONE (OUTPATIENT)
Dept: NEUROLOGY | Age: 64
End: 2024-06-05

## 2024-06-05 ENCOUNTER — OFFICE VISIT (OUTPATIENT)
Dept: NEUROLOGY | Age: 64
End: 2024-06-05
Payer: MEDICARE

## 2024-06-05 VITALS
BODY MASS INDEX: 31.15 KG/M2 | HEIGHT: 72 IN | SYSTOLIC BLOOD PRESSURE: 111 MMHG | DIASTOLIC BLOOD PRESSURE: 69 MMHG | WEIGHT: 230 LBS | HEART RATE: 51 BPM

## 2024-06-05 DIAGNOSIS — R13.10 DYSPHAGIA, UNSPECIFIED TYPE: Primary | ICD-10-CM

## 2024-06-05 PROCEDURE — G8417 CALC BMI ABV UP PARAM F/U: HCPCS | Performed by: PSYCHIATRY & NEUROLOGY

## 2024-06-05 PROCEDURE — 3017F COLORECTAL CA SCREEN DOC REV: CPT | Performed by: PSYCHIATRY & NEUROLOGY

## 2024-06-05 PROCEDURE — G8427 DOCREV CUR MEDS BY ELIG CLIN: HCPCS | Performed by: PSYCHIATRY & NEUROLOGY

## 2024-06-05 PROCEDURE — 3074F SYST BP LT 130 MM HG: CPT | Performed by: PSYCHIATRY & NEUROLOGY

## 2024-06-05 PROCEDURE — 3078F DIAST BP <80 MM HG: CPT | Performed by: PSYCHIATRY & NEUROLOGY

## 2024-06-05 PROCEDURE — 1036F TOBACCO NON-USER: CPT | Performed by: PSYCHIATRY & NEUROLOGY

## 2024-06-05 PROCEDURE — 1123F ACP DISCUSS/DSCN MKR DOCD: CPT | Performed by: PSYCHIATRY & NEUROLOGY

## 2024-06-05 PROCEDURE — 99214 OFFICE O/P EST MOD 30 MIN: CPT | Performed by: PSYCHIATRY & NEUROLOGY

## 2024-06-05 NOTE — PROGRESS NOTES
Numbness  [] Tremors  [] Speech Difficulty  [] Headaches  [] Light Sensitivity  [] Sound Sensitivity   Endocrinology []Excessive thirst  []Excessive hunger   Psychiatric [] Anxiety/Depression  [] Hallucination   Allergy/immunology []Hives/environmental allergies   Hematologic/lymph [] Abnormal bleeding  [] Abnormal bruising     Neurological examination:    Mental status   Alert and oriented; intact memory with no confusion, speech or language problems; no hallucinations or delusions     Cranial nerves   II - visual fields intact to confrontation                                                III, IV, VI - extra-ocular muscles full: no pupillary defect; no ANALI, no nystagmus, no ptosis   V - normal facial sensation                                                               VII - normal facial symmetry                                                             VIII - intact hearing                                                                             IX, X - symmetrical palate                                                                  XI - symmetrical shoulder shrug                                                       XII - midline tongue without atrophy or fasciculation     Motor function  Normal muscle bulk and tone; normal power 5/5, including fine motor movements     Sensory function Intact to touch, pin, vibration, proprioception     Cerebellar Intact fine motor movement. No involuntary movements or tremors     Reflex function Intact 2+ DTR and symmetric. Negative Babinski     Gait                  Normal station and gait       No results found for: \"LDLDIRECT\"  No components found for: \"CHLPL\"  Lab Results   Component Value Date    TRIG 58 03/21/2024    TRIG 42 04/13/2023    TRIG 66 04/06/2022     Lab Results   Component Value Date    HDL 49 03/21/2024    HDL 59 04/13/2023    HDL 49 04/06/2022     No components found for: \"LDLCALC\"  No components found for: \"LABVLDL\"  Lab Results   Component

## 2024-06-05 NOTE — TELEPHONE ENCOUNTER
Mr. Rodriguez saw Dr. Perdomo in follow up today. He still has not had the muscle biopsy done. apparently there was some issue with it being cancelled by [facility].  Dr. Perdomo asked me to call them to find out if he can get it here (which they previously said no) or if they can get him scheduled soon.

## 2024-06-25 NOTE — TELEPHONE ENCOUNTER
I called Bobo and he said to go ahead and contact [facility].  He is frustrated with them. I called [phone] an spoke with the [facility]. She sent a message to Froylan who is the MA with the team that schedules those surgeries. She said Mr. Hutton should get a call within 24 hours.  I called Bobo back and let him know. i did tell him that if he doesn't hear from them to please call me back.

## 2024-07-16 ENCOUNTER — HOSPITAL ENCOUNTER (EMERGENCY)
Age: 64
Discharge: HOME | End: 2024-07-17
Attending: EMERGENCY MEDICINE
Payer: MEDICARE

## 2024-07-16 VITALS
BODY MASS INDEX: 31.19 KG/M2 | DIASTOLIC BLOOD PRESSURE: 68 MMHG | WEIGHT: 230 LBS | OXYGEN SATURATION: 99 % | SYSTOLIC BLOOD PRESSURE: 124 MMHG | TEMPERATURE: 97.3 F | HEART RATE: 87 BPM | RESPIRATION RATE: 16 BRPM

## 2024-07-16 DIAGNOSIS — N30.01 ACUTE CYSTITIS WITH HEMATURIA: Primary | ICD-10-CM

## 2024-07-16 LAB
BACTERIA URNS QL MICRO: ABNORMAL
BILIRUB UR QL STRIP: NEGATIVE
CLARITY UR: ABNORMAL
COLOR UR: YELLOW
EPI CELLS #/AREA URNS HPF: ABNORMAL /HPF (ref 0–5)
GLUCOSE UR STRIP-MCNC: NEGATIVE MG/DL
HGB UR QL STRIP.AUTO: ABNORMAL
KETONES UR STRIP-MCNC: NEGATIVE MG/DL
LEUKOCYTE ESTERASE UR QL STRIP: ABNORMAL
MUCOUS THREADS URNS QL MICRO: ABNORMAL
NITRITE UR QL STRIP: NEGATIVE
PH UR STRIP: 7.5 [PH] (ref 5–8)
PROT UR STRIP-MCNC: ABNORMAL MG/DL
RBC #/AREA URNS HPF: ABNORMAL /HPF (ref 0–2)
SP GR UR STRIP: 1.01 (ref 1–1.03)
UROBILINOGEN UR STRIP-ACNC: ABNORMAL EU/DL (ref 0–1)
WBC #/AREA URNS HPF: ABNORMAL /HPF (ref 0–5)

## 2024-07-16 PROCEDURE — 99284 EMERGENCY DEPT VISIT MOD MDM: CPT

## 2024-07-16 PROCEDURE — 81001 URINALYSIS AUTO W/SCOPE: CPT

## 2024-07-16 ASSESSMENT — PAIN SCALES - GENERAL: PAINLEVEL_OUTOF10: 0

## 2024-07-16 ASSESSMENT — PAIN - FUNCTIONAL ASSESSMENT: PAIN_FUNCTIONAL_ASSESSMENT: 0-10

## 2024-07-17 PROCEDURE — 96372 THER/PROPH/DIAG INJ SC/IM: CPT

## 2024-07-17 PROCEDURE — 2580000003 HC RX 258: Performed by: EMERGENCY MEDICINE

## 2024-07-17 PROCEDURE — 6360000002 HC RX W HCPCS: Performed by: EMERGENCY MEDICINE

## 2024-07-17 RX ORDER — CEPHALEXIN 500 MG/1
500 CAPSULE ORAL 4 TIMES DAILY
Qty: 28 CAPSULE | Refills: 0 | Status: SHIPPED | OUTPATIENT
Start: 2024-07-17 | End: 2024-07-24

## 2024-07-17 RX ADMIN — WATER 1000 MG: 1 INJECTION INTRAMUSCULAR; INTRAVENOUS; SUBCUTANEOUS at 00:15

## 2024-07-17 NOTE — ED NOTES
Here tonight with difficulty urinating to completion, that started at 0400. Unable to get an appointment today to see his urologist. \"I take Flomax, which was working fine until 0400 this morning. I start urinating, then it stops. It's an irritation when it stops. My urine starts back up after a few seconds, but it's not normal. It's just in my penis tip where the issue is.\" Denies abdominal discomfort or pressure. Feels oral food and fluid intake is normal. \"I have the urge to go a lot more today, and I take a BP pill with water pill.\"

## 2024-07-17 NOTE — ED NOTES
Ambulated to BR to void. Gait slow, steady. States \"That was the best urination since this morning.\" Denies feelings of post void residual.    Dr Saldivar in to talk with pt.

## 2024-07-17 NOTE — ED PROVIDER NOTES
EMERGENCY DEPARTMENT ENCOUNTER    Pt Name: Bobo Hutton  MRN: [id number]  Birthdate 1960  Date of evaluation: 7/16/24  CHIEF COMPLAINT       Chief Complaint   Patient presents with    Urinary Frequency     Concerned about UTI. Started taking flomax 4 years ago & states he has not had issues until now     HISTORY OF PRESENT ILLNESS   The history is provided by the patient and medical records.    The patient is a 63-year-old male who presents to the ED for dysuria that started approximately 4 AM this morning.  No reports of hematuria.  Patient has been taking Flomax for approximately 4 years for BPH and has never had these issues in the past.    REVIEW OF SYSTEMS     Review of Systems  All other systems reviewed and are negative.    PASTMEDICAL HISTORY     Past Medical History:   Diagnosis Date    Arthritis     neck    BPH (benign prostatic hyperplasia)     Class 2 severe obesity due to excess calories with serious comorbidity in adult ([facility])     Obesity (BMI 30.0-34.9) [E66.9]    Dysphagia     Erectile dysfunction     Full dentures     GERD (gastroesophageal reflux disease)     Hiatal hernia     Hyperlipidemia     Hypertension     YUMIKO on CPAP     PONV (postoperative nausea and vomiting)     nauseated after last surgery    Unspecified sleep apnea     cpap nightly     Past Problem List  Patient Active Problem List   Diagnosis Code    BPH (benign prostatic hyperplasia) N40.0    Erectile dysfunction N52.9    GERD (gastroesophageal reflux disease) K21.9    Tendinopathy of rotator cuff M67.919    YUMIKO on CPAP G47.33    Anxiety hyperventilation F41.9, F45.8    Seasonal allergic rhinitis J30.2    Oropharyngeal dysphagia R13.12    Myasthenia gravis ([facility]) G70.00    Essential hypertension I10    Mixed hyperlipidemia E78.2    History of esophageal dilatation Z98.890    Spondylosis M47.9     SURGICAL HISTORY       Past Surgical History:   Procedure Laterality Date    COLONOSCOPY      ENDOSCOPY, COLON, DIAGNOSTIC

## 2024-08-28 DIAGNOSIS — I10 ESSENTIAL HYPERTENSION: ICD-10-CM

## 2024-08-29 RX ORDER — AMLODIPINE BESYLATE 10 MG/1
10 TABLET ORAL EVERY MORNING
Qty: 90 TABLET | Refills: 0 | Status: SHIPPED | OUTPATIENT
Start: 2024-08-29

## 2024-08-29 NOTE — TELEPHONE ENCOUNTER
Bobo Hutton is calling to request a refill on the following medication(s):    Medication Request:  Requested Prescriptions     Pending Prescriptions Disp Refills    amLODIPine (NORVASC) 10 MG tablet [Pharmacy Med Name: amLODIPine Besylate 10 MG Oral Tablet] 90 tablet 3     Sig: TAKE 1 TABLET BY MOUTH IN THE  MORNING       Last Visit Date (If Applicable):  3/20/2024    Next Visit Date:    9/20/2024      Last refill 4/10/24. Prescription pending.

## 2024-09-20 ENCOUNTER — OFFICE VISIT (OUTPATIENT)
Dept: FAMILY MEDICINE CLINIC | Age: 64
End: 2024-09-20

## 2024-09-20 VITALS
WEIGHT: 216.6 LBS | RESPIRATION RATE: 12 BRPM | HEIGHT: 72 IN | OXYGEN SATURATION: 99 % | HEART RATE: 65 BPM | SYSTOLIC BLOOD PRESSURE: 112 MMHG | DIASTOLIC BLOOD PRESSURE: 66 MMHG | BODY MASS INDEX: 29.34 KG/M2 | TEMPERATURE: 98.5 F

## 2024-09-20 DIAGNOSIS — I10 ESSENTIAL HYPERTENSION: ICD-10-CM

## 2024-09-20 DIAGNOSIS — N52.01 ERECTILE DYSFUNCTION DUE TO ARTERIAL INSUFFICIENCY: ICD-10-CM

## 2024-09-20 DIAGNOSIS — G47.33 OSA ON CPAP: ICD-10-CM

## 2024-09-20 DIAGNOSIS — K21.9 GASTROESOPHAGEAL REFLUX DISEASE WITHOUT ESOPHAGITIS: ICD-10-CM

## 2024-09-20 DIAGNOSIS — N40.0 BENIGN PROSTATIC HYPERPLASIA WITHOUT LOWER URINARY TRACT SYMPTOMS: Primary | ICD-10-CM

## 2024-09-20 DIAGNOSIS — E78.2 MIXED HYPERLIPIDEMIA: ICD-10-CM

## 2024-09-20 DIAGNOSIS — G70.00 MYASTHENIA GRAVIS (HCC): Chronic | ICD-10-CM

## 2024-09-20 DIAGNOSIS — F41.9 ANXIETY HYPERVENTILATION: ICD-10-CM

## 2024-09-20 DIAGNOSIS — M67.919 TENDINOPATHY OF ROTATOR CUFF, UNSPECIFIED LATERALITY: ICD-10-CM

## 2024-09-20 DIAGNOSIS — J30.1 SEASONAL ALLERGIC RHINITIS DUE TO POLLEN: ICD-10-CM

## 2024-09-20 DIAGNOSIS — Z98.890 HISTORY OF ESOPHAGEAL DILATATION: ICD-10-CM

## 2024-09-20 DIAGNOSIS — F45.8 ANXIETY HYPERVENTILATION: ICD-10-CM

## 2024-09-20 DIAGNOSIS — M47.9 SPONDYLOSIS: ICD-10-CM

## 2024-09-20 PROBLEM — R13.19 OTHER DYSPHAGIA: Status: RESOLVED | Noted: 2021-09-07 | Resolved: 2024-09-20

## 2024-09-20 PROBLEM — N52.9 ERECTILE DYSFUNCTION: Status: ACTIVE | Noted: 2023-10-16

## 2024-09-20 PROBLEM — R13.19 OTHER DYSPHAGIA: Status: ACTIVE | Noted: 2021-09-07

## 2024-09-20 RX ORDER — DOCUSATE SODIUM 100 MG/1
100 CAPSULE, LIQUID FILLED ORAL 2 TIMES DAILY
Qty: 60 CAPSULE | Refills: 0 | Status: SHIPPED | OUTPATIENT
Start: 2024-09-20 | End: 2024-10-20

## 2024-09-20 RX ORDER — POLYETHYLENE GLYCOL 3350 17 G/17G
17 POWDER, FOR SOLUTION ORAL DAILY
Qty: 1530 G | Refills: 1 | Status: SHIPPED | OUTPATIENT
Start: 2024-09-20

## 2024-09-20 SDOH — ECONOMIC STABILITY: FOOD INSECURITY: WITHIN THE PAST 12 MONTHS, THE FOOD YOU BOUGHT JUST DIDN'T LAST AND YOU DIDN'T HAVE MONEY TO GET MORE.: NEVER TRUE

## 2024-09-20 SDOH — ECONOMIC STABILITY: FOOD INSECURITY: WITHIN THE PAST 12 MONTHS, YOU WORRIED THAT YOUR FOOD WOULD RUN OUT BEFORE YOU GOT MONEY TO BUY MORE.: NEVER TRUE

## 2024-09-20 SDOH — ECONOMIC STABILITY: INCOME INSECURITY: HOW HARD IS IT FOR YOU TO PAY FOR THE VERY BASICS LIKE FOOD, HOUSING, MEDICAL CARE, AND HEATING?: NOT HARD AT ALL

## 2024-09-20 ASSESSMENT — ENCOUNTER SYMPTOMS
GASTROINTESTINAL NEGATIVE: 1
ALLERGIC/IMMUNOLOGIC NEGATIVE: 1
EYES NEGATIVE: 1
RESPIRATORY NEGATIVE: 1

## 2024-09-23 RX ORDER — PRAVASTATIN SODIUM 40 MG
40 TABLET ORAL DAILY
Qty: 90 TABLET | Refills: 1 | Status: SHIPPED | OUTPATIENT
Start: 2024-09-23

## 2024-09-23 RX ORDER — VALSARTAN AND HYDROCHLOROTHIAZIDE 160; 12.5 MG/1; MG/1
1 TABLET, FILM COATED ORAL DAILY
Qty: 90 TABLET | Refills: 1 | Status: SHIPPED | OUTPATIENT
Start: 2024-09-23

## 2024-10-07 ENCOUNTER — TELEPHONE (OUTPATIENT)
Dept: NEUROLOGY | Age: 64
End: 2024-10-07

## 2024-10-07 ENCOUNTER — OFFICE VISIT (OUTPATIENT)
Dept: NEUROLOGY | Age: 64
End: 2024-10-07
Payer: MEDICARE

## 2024-10-07 VITALS
HEIGHT: 72 IN | DIASTOLIC BLOOD PRESSURE: 68 MMHG | BODY MASS INDEX: 29.55 KG/M2 | SYSTOLIC BLOOD PRESSURE: 106 MMHG | WEIGHT: 218.2 LBS | HEART RATE: 59 BPM

## 2024-10-07 DIAGNOSIS — R13.10 DYSPHAGIA, UNSPECIFIED TYPE: Primary | ICD-10-CM

## 2024-10-07 PROCEDURE — 3017F COLORECTAL CA SCREEN DOC REV: CPT | Performed by: PSYCHIATRY & NEUROLOGY

## 2024-10-07 PROCEDURE — G8417 CALC BMI ABV UP PARAM F/U: HCPCS | Performed by: PSYCHIATRY & NEUROLOGY

## 2024-10-07 PROCEDURE — 1036F TOBACCO NON-USER: CPT | Performed by: PSYCHIATRY & NEUROLOGY

## 2024-10-07 PROCEDURE — 99215 OFFICE O/P EST HI 40 MIN: CPT | Performed by: PSYCHIATRY & NEUROLOGY

## 2024-10-07 PROCEDURE — 3078F DIAST BP <80 MM HG: CPT | Performed by: PSYCHIATRY & NEUROLOGY

## 2024-10-07 PROCEDURE — G8427 DOCREV CUR MEDS BY ELIG CLIN: HCPCS | Performed by: PSYCHIATRY & NEUROLOGY

## 2024-10-07 PROCEDURE — 1123F ACP DISCUSS/DSCN MKR DOCD: CPT | Performed by: PSYCHIATRY & NEUROLOGY

## 2024-10-07 PROCEDURE — 3074F SYST BP LT 130 MM HG: CPT | Performed by: PSYCHIATRY & NEUROLOGY

## 2024-10-07 PROCEDURE — G8484 FLU IMMUNIZE NO ADMIN: HCPCS | Performed by: PSYCHIATRY & NEUROLOGY

## 2024-10-07 NOTE — PROGRESS NOTES
[facility] [place]  [address], [address]  [place] [address]  Ph: [phone] or [phone]  FAX: [phone]    Chief Complaint: Dysphagia     Reason for Consult:  I had the pleasure of seeing your patient in neurology consultation for his symptoms. As you would recall, Bobo Hutton is a 62-year-old male who presents with a history of dysphagia. His symptoms began 2 to 3 years ago with sudden onset difficulty swallowing solids and liquids and speech issues after prolonged use. Despite multiple evaluations, including consultations with gastroenterology and neurology at various institutions, the etiology of his symptoms remains unclear. He was referred for further evaluation by a neuromuscular specialist at the [facility].  He reports that his dysphagia has been stable without significant progression. He has not experienced choking or aspiration episodes. He was trialed on pyridostigmine 60 mg three times daily, which provided mild improvement, though there is low suspicion for myasthenia gravis as he lacks fatigability and further generalization of symptoms. He also trialed prednisone without benefit. The patient has a history of bilateral non-fatigable ptosis, which has remained unchanged.  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 Despite ongoing scheduling issues, the recommended deltoid muscle biopsy has not yet been performed. We are coordinating with the [facility] for this. Additional tests, including myopathy panels and specific antibody testing, have been negative to date.  Past Medical History:  Arthritis (neck)  Benign prostatic hyperplasia (BPH)  Severe obesity (BMI 29.59)  Dysphagia  Erectile dysfunction  Full dentures  GERD  Hiatal hernia  Hyperlipidemia  Hypertension  Obstructive sleep apnea (YUMIKO) on CPAP  Postoperative nausea and vomiting (PONV)  Unspecified sleep apnea  Past Surgical

## 2024-10-07 NOTE — TELEPHONE ENCOUNTER
Dr. Perdomo saw Mr. Hutton again today. He stated he had not heard back from [facility]. Dr. Perdomo would like him to go ahead and get the muscle biopsy done here as he has not been able to get it done at [facility].   He did ask me to call and find out for sure if they will accept it as he would not want him to be put through the biopsy again.

## 2024-10-09 NOTE — TELEPHONE ENCOUNTER
I spoke with Connie at [facility] [phone].  She is messaging the team neuromuscular team at [facility].  I reiterated that getting this done through [facility] has not been easy or a good experience for Mr. Hutton so it would be helpful it it can be done here locally in [place]. Often the muscle biopsy is sent to [facility] anyway for interpretation.

## 2024-10-11 NOTE — TELEPHONE ENCOUNTER
Jessica called me back.  Does prefer that it be done at [facility] and Dr. Gasca does much prefer it to be done at [facility]. He is going to call neurosurgery himself and Jessica will also continue to follow this. She did call Mr. Hutton and he told her he prefers [facility] but is willing to go to [facility] for it if this can be arranged.

## 2024-10-11 NOTE — TELEPHONE ENCOUNTER
Jessica from [facility] neurology called back. She asked me to explain again what our request was. She will check with Dr. Gasca regarding having the muscle biopsy done here in [place] and let us know.  I explained this has been going on for almost 2 years and Dr. Escudero would really like to get the biopsy done and if can be done here in [place] would probably be the best option.

## 2024-10-22 NOTE — TELEPHONE ENCOUNTER
I called and spoke with Bobo. He is scheduled for Nov. 14, 2024 at [facility].

## 2024-10-31 DIAGNOSIS — I10 ESSENTIAL HYPERTENSION: ICD-10-CM

## 2024-10-31 RX ORDER — AMLODIPINE BESYLATE 10 MG/1
10 TABLET ORAL EVERY MORNING
Qty: 90 TABLET | Refills: 1 | Status: SHIPPED | OUTPATIENT
Start: 2024-10-31

## 2024-10-31 NOTE — TELEPHONE ENCOUNTER
Bobo Hutton is calling to request a refill on the following medication(s):    Medication Request:  Requested Prescriptions     Pending Prescriptions Disp Refills    amLODIPine (NORVASC) 10 MG tablet [Pharmacy Med Name: amLODIPine Besylate 10 MG Oral Tablet] 90 tablet 3     Sig: TAKE 1 TABLET BY MOUTH IN THE  MORNING       Last Visit Date (If Applicable):  9/20/2024    Next Visit Date:    3/21/2025

## 2025-01-20 NOTE — TELEPHONE ENCOUNTER
Bobo Hutton is calling to request a refill on the following medication(s):    Medication Request:  Requested Prescriptions     Pending Prescriptions Disp Refills    omeprazole (PRILOSEC) 20 MG delayed release capsule [Pharmacy Med Name: Omeprazole 20 MG Oral Capsule Delayed Release] 90 capsule 3     Sig: TAKE 1 CAPSULE BY MOUTH DAILY AT NIGHT       Last Visit Date (If Applicable):  9/20/2024    Next Visit Date:    3/21/2025

## 2025-02-03 ENCOUNTER — TELEPHONE (OUTPATIENT)
Dept: NEUROLOGY | Age: 65
End: 2025-02-03

## 2025-02-03 ENCOUNTER — OFFICE VISIT (OUTPATIENT)
Dept: NEUROLOGY | Age: 65
End: 2025-02-03
Payer: MEDICARE

## 2025-02-03 VITALS
HEIGHT: 72 IN | WEIGHT: 226.4 LBS | BODY MASS INDEX: 30.66 KG/M2 | DIASTOLIC BLOOD PRESSURE: 66 MMHG | SYSTOLIC BLOOD PRESSURE: 101 MMHG | HEART RATE: 56 BPM

## 2025-02-03 DIAGNOSIS — G70.00 MYASTHENIA GRAVIS (HCC): ICD-10-CM

## 2025-02-03 DIAGNOSIS — R13.10 DYSPHAGIA, UNSPECIFIED TYPE: Primary | ICD-10-CM

## 2025-02-03 PROCEDURE — G8417 CALC BMI ABV UP PARAM F/U: HCPCS | Performed by: PSYCHIATRY & NEUROLOGY

## 2025-02-03 PROCEDURE — 3017F COLORECTAL CA SCREEN DOC REV: CPT | Performed by: PSYCHIATRY & NEUROLOGY

## 2025-02-03 PROCEDURE — 1036F TOBACCO NON-USER: CPT | Performed by: PSYCHIATRY & NEUROLOGY

## 2025-02-03 PROCEDURE — 1123F ACP DISCUSS/DSCN MKR DOCD: CPT | Performed by: PSYCHIATRY & NEUROLOGY

## 2025-02-03 PROCEDURE — 3074F SYST BP LT 130 MM HG: CPT | Performed by: PSYCHIATRY & NEUROLOGY

## 2025-02-03 PROCEDURE — 3078F DIAST BP <80 MM HG: CPT | Performed by: PSYCHIATRY & NEUROLOGY

## 2025-02-03 PROCEDURE — G8427 DOCREV CUR MEDS BY ELIG CLIN: HCPCS | Performed by: PSYCHIATRY & NEUROLOGY

## 2025-02-03 PROCEDURE — 99214 OFFICE O/P EST MOD 30 MIN: CPT | Performed by: PSYCHIATRY & NEUROLOGY

## 2025-02-03 PROCEDURE — 1159F MED LIST DOCD IN RCRD: CPT | Performed by: PSYCHIATRY & NEUROLOGY

## 2025-02-03 NOTE — TELEPHONE ENCOUNTER
I called [facility] neuromuscular clinic, Dr Gasca's office. LM for the nurse to call the office back regarding the next steps after pt's muscle biopsy.

## 2025-02-03 NOTE — PROGRESS NOTES
[facility] [place]  [address], [address]  [place] [address]  Ph: [phone] or [phone]  FAX: [phone]      Reason for Consult: Dysphagia    I had the pleasure of seeing your patient, Bobo Hutton, in neurology consultation for his ongoing symptoms. As you would recall, Mr. Hutton is a 64-year-old male with a two- to three-year history of dysphagia, characterized by difficulty swallowing both solids and liquids, along with speech fatigue after prolonged use. Despite extensive evaluations by gastroenterology and neurology, the underlying etiology remains uncertain. His condition has been stable without progression, and he denies episodes of choking or aspiration. He was trialed on pyridostigmine, which provided mild but nonspecific improvement, and prednisone, which yielded no benefit. Given his history of bilateral non-fatigable ptosis, myasthenia gravis was initially considered but has been deemed unlikely due to the absence of characteristic fatigability or generalized weakness.    An EMG performed in September 2023 demonstrated electrodiagnostic evidence of myopathy with mild muscle membrane irritability. A deltoid muscle biopsy was recommended and performed on November 14, 2024, at the [facility]. The biopsy results revealed chronic neurogenic changes with scattered atrophic fibers and rare foci of perimysial lymphocytic inflammation but no clear evidence of inflammatory myopathy or a primary muscular dystrophy. Immunohistochemical staining showed focal perifascicular MHC-I and MHC-II expression, a finding that, while commonly associated with inflammatory myopathies, can also be seen in chronic neurogenic disorders. There was no significant endomysial inflammatory infiltrate, regenerating fibers, or fibrosis. Given these findings, a definitive neuromuscular diagnosis remains elusive, though a neurogenic process is suspected.    Mr. Hutton remains

## 2025-02-10 ENCOUNTER — HOSPITAL ENCOUNTER (OUTPATIENT)
Dept: GENERAL RADIOLOGY | Age: 65
Discharge: HOME | End: 2025-02-12
Attending: PSYCHIATRY & NEUROLOGY
Payer: MEDICARE

## 2025-02-10 DIAGNOSIS — R13.10 DYSPHAGIA, UNSPECIFIED TYPE: ICD-10-CM

## 2025-02-10 PROCEDURE — 92611 MOTION FLUOROSCOPY/SWALLOW: CPT

## 2025-02-10 PROCEDURE — 74230 X-RAY XM SWLNG FUNCJ C+: CPT

## 2025-02-10 RX ORDER — PRAVASTATIN SODIUM 40 MG
40 TABLET ORAL DAILY
Qty: 90 TABLET | Refills: 1 | Status: SHIPPED | OUTPATIENT
Start: 2025-02-10

## 2025-02-10 RX ORDER — VALSARTAN AND HYDROCHLOROTHIAZIDE 160; 12.5 MG/1; MG/1
1 TABLET, FILM COATED ORAL DAILY
Qty: 90 TABLET | Refills: 1 | Status: SHIPPED | OUTPATIENT
Start: 2025-02-10

## 2025-02-10 NOTE — TELEPHONE ENCOUNTER
Bobo Hutton is calling to request a refill on the following medication(s):    Medication Request:  Requested Prescriptions     Pending Prescriptions Disp Refills    pravastatin (PRAVACHOL) 40 MG tablet [Pharmacy Med Name: Pravastatin Sodium 40 MG Oral Tablet] 90 tablet 3     Sig: TAKE 1 TABLET BY MOUTH DAILY    valsartan-hydroCHLOROthiazide (DIOVAN-HCT) 160-12.5 MG per tablet [Pharmacy Med Name: Valsartan-hydroCHLOROthiazide 160-12.5 MG Oral Tablet] 90 tablet 3     Sig: TAKE 1 TABLET BY MOUTH DAILY       Last Visit Date (If Applicable):  9/20/2024    Next Visit Date:    3/21/2025

## 2025-02-10 NOTE — PROCEDURES
folds, and is ejected from the airway      Patient presents with probable safe swallow for Regular diet with thin liquids as evidenced by no overt s/s of aspiration noted with consistencies tested.  Pt with reduced cricopharyngeal opening and noted Osteophytes. While noted observations did not appear to be effecting pts swallow function/motility, pt reports overall discomfort resulting in him consuming all solids via puree texture. Pt does report hx of esophageal dilation, therefore pt may benefit from this being completed again. Recommend follow-up with neurology and/or referral to ENT to further discuss results and potential treatment options if warranted.     -penetration/aspiration of thin liquids by cup, +trace penetration with ejection of thin liquids by straw. Mod vallecular, PPW and pyriform residual. -penetration/aspiration of puree, soft solids and regular solids. Premature spillage to BOT. Mild to Mod vallecular, PPW and pyriform residual. Dry swallows and liquid wash assisted with some mild clearance of residual.       Recommend small sips and bites, only feed when alert and awake and upright at 90 degrees for all PO intake.  Recommend close monitoring for overt/clinical s/s of aspiration and D/C PO intake and complete Modified Barium Swallow Study should they occur.  Results and recommendations reported to RN.    Recommended Diet:  Solids: Regular   Liquids: Thin   Medications : PO     Safe Swallow Protocol:  Compensatory Swallowing Strategies : Eat/Feed slowly;Alternate solids and liquids;Remain upright for 30-45 minutes after meals;Small bites/sips (Well moisten foods with sauces/gravy)    Recommendations/Treatment  Requires SLP Intervention: No  Referral To: Neurology (Follow-up)    Education: Images and recommendations were reviewed with pt following this exam.   Patient Education: Yes  Patient Education Response: Verbalizes understanding    Prognosis  Prognosis for safe diet advancement:

## 2025-03-21 ENCOUNTER — OFFICE VISIT (OUTPATIENT)
Dept: FAMILY MEDICINE CLINIC | Age: 65
End: 2025-03-21

## 2025-03-21 ENCOUNTER — HOSPITAL ENCOUNTER (OUTPATIENT)
Age: 65
Setting detail: SPECIMEN
Discharge: HOME | End: 2025-03-21

## 2025-03-21 VITALS
WEIGHT: 230.8 LBS | TEMPERATURE: 98.4 F | OXYGEN SATURATION: 99 % | BODY MASS INDEX: 31.26 KG/M2 | HEART RATE: 69 BPM | RESPIRATION RATE: 16 BRPM | SYSTOLIC BLOOD PRESSURE: 122 MMHG | HEIGHT: 72 IN | DIASTOLIC BLOOD PRESSURE: 78 MMHG

## 2025-03-21 DIAGNOSIS — G70.00 MYASTHENIA GRAVIS: Chronic | ICD-10-CM

## 2025-03-21 DIAGNOSIS — F41.9 ANXIETY HYPERVENTILATION: ICD-10-CM

## 2025-03-21 DIAGNOSIS — K21.9 GASTROESOPHAGEAL REFLUX DISEASE WITHOUT ESOPHAGITIS: ICD-10-CM

## 2025-03-21 DIAGNOSIS — R73.9 HYPERGLYCEMIA: ICD-10-CM

## 2025-03-21 DIAGNOSIS — Z98.890 HISTORY OF ESOPHAGEAL DILATATION: ICD-10-CM

## 2025-03-21 DIAGNOSIS — N52.01 ERECTILE DYSFUNCTION DUE TO ARTERIAL INSUFFICIENCY: ICD-10-CM

## 2025-03-21 DIAGNOSIS — Z00.00 INITIAL MEDICARE ANNUAL WELLNESS VISIT: ICD-10-CM

## 2025-03-21 DIAGNOSIS — M47.9 SPONDYLOSIS: ICD-10-CM

## 2025-03-21 DIAGNOSIS — Z00.00 INITIAL MEDICARE ANNUAL WELLNESS VISIT: Primary | ICD-10-CM

## 2025-03-21 DIAGNOSIS — Z12.5 ENCOUNTER FOR PROSTATE CANCER SCREENING: ICD-10-CM

## 2025-03-21 DIAGNOSIS — G47.33 OSA ON CPAP: ICD-10-CM

## 2025-03-21 DIAGNOSIS — N40.0 BENIGN PROSTATIC HYPERPLASIA WITHOUT LOWER URINARY TRACT SYMPTOMS: ICD-10-CM

## 2025-03-21 DIAGNOSIS — M67.919 TENDINOPATHY OF ROTATOR CUFF, UNSPECIFIED LATERALITY: ICD-10-CM

## 2025-03-21 DIAGNOSIS — F45.8 ANXIETY HYPERVENTILATION: ICD-10-CM

## 2025-03-21 DIAGNOSIS — I10 ESSENTIAL HYPERTENSION: ICD-10-CM

## 2025-03-21 DIAGNOSIS — E78.2 MIXED HYPERLIPIDEMIA: ICD-10-CM

## 2025-03-21 DIAGNOSIS — J30.89 SEASONAL ALLERGIC RHINITIS DUE TO OTHER ALLERGIC TRIGGER: ICD-10-CM

## 2025-03-21 LAB
ALBUMIN SERPL-MCNC: 4.3 G/DL (ref 3.5–5.2)
ALBUMIN/GLOB SERPL: 1.4 {RATIO} (ref 1–2.5)
ALP SERPL-CCNC: 97 U/L (ref 40–129)
ALT SERPL-CCNC: 18 U/L (ref 10–50)
ANION GAP SERPL CALCULATED.3IONS-SCNC: 8 MMOL/L (ref 9–16)
AST SERPL-CCNC: 21 U/L (ref 10–50)
BILIRUB SERPL-MCNC: 1.6 MG/DL (ref 0–1.2)
BILIRUB UR QL STRIP: NEGATIVE
BUN SERPL-MCNC: 11 MG/DL (ref 8–23)
CALCIUM SERPL-MCNC: 9.5 MG/DL (ref 8.6–10.4)
CHLORIDE SERPL-SCNC: 106 MMOL/L (ref 98–107)
CHOLEST SERPL-MCNC: 127 MG/DL (ref 0–199)
CHOLESTEROL/HDL RATIO: 2.5
CLARITY UR: CLEAR
CO2 SERPL-SCNC: 28 MMOL/L (ref 20–31)
COLOR UR: YELLOW
COMMENT: NORMAL
CREAT SERPL-MCNC: 1 MG/DL (ref 0.7–1.2)
ERYTHROCYTE [DISTWIDTH] IN BLOOD BY AUTOMATED COUNT: 13.2 % (ref 11.8–14.4)
EST. AVERAGE GLUCOSE BLD GHB EST-MCNC: 100 MG/DL
GFR, ESTIMATED: 83 ML/MIN/1.73M2
GLUCOSE SERPL-MCNC: 95 MG/DL (ref 74–99)
GLUCOSE UR STRIP-MCNC: NEGATIVE MG/DL
HBA1C MFR BLD: 5.1 % (ref 4–6)
HCT VFR BLD AUTO: 40 % (ref 40.7–50.3)
HDLC SERPL-MCNC: 51 MG/DL
HGB BLD-MCNC: 13 G/DL (ref 13–17)
HGB UR QL STRIP.AUTO: NEGATIVE
KETONES UR STRIP-MCNC: NEGATIVE MG/DL
LDLC SERPL CALC-MCNC: 66 MG/DL (ref 0–100)
LEUKOCYTE ESTERASE UR QL STRIP: NEGATIVE
MCH RBC QN AUTO: 28.4 PG (ref 25.2–33.5)
MCHC RBC AUTO-ENTMCNC: 32.5 G/DL (ref 28.4–34.8)
MCV RBC AUTO: 87.5 FL (ref 82.6–102.9)
NITRITE UR QL STRIP: NEGATIVE
NRBC BLD-RTO: 0 PER 100 WBC
PH UR STRIP: 7.5 [PH] (ref 5–8)
PLATELET # BLD AUTO: 213 K/UL (ref 138–453)
PMV BLD AUTO: 9.9 FL (ref 8.1–13.5)
POTASSIUM SERPL-SCNC: 3.6 MMOL/L (ref 3.7–5.3)
PROT SERPL-MCNC: 7.4 G/DL (ref 6.6–8.7)
PROT UR STRIP-MCNC: NEGATIVE MG/DL
PSA SERPL-MCNC: 4.48 NG/ML (ref 0–4)
RBC # BLD AUTO: 4.57 M/UL (ref 4.21–5.77)
SODIUM SERPL-SCNC: 142 MMOL/L (ref 136–145)
SP GR UR STRIP: 1.01 (ref 1–1.03)
TRIGL SERPL-MCNC: 49 MG/DL
TSH SERPL DL<=0.05 MIU/L-ACNC: 3.32 UIU/ML (ref 0.27–4.2)
UROBILINOGEN UR STRIP-ACNC: NORMAL EU/DL (ref 0–1)
VLDLC SERPL CALC-MCNC: 10 MG/DL (ref 1–30)
WBC OTHER # BLD: 4 K/UL (ref 3.5–11.3)

## 2025-03-21 SDOH — ECONOMIC STABILITY: FOOD INSECURITY: WITHIN THE PAST 12 MONTHS, YOU WORRIED THAT YOUR FOOD WOULD RUN OUT BEFORE YOU GOT MONEY TO BUY MORE.: NEVER TRUE

## 2025-03-21 SDOH — ECONOMIC STABILITY: FOOD INSECURITY: WITHIN THE PAST 12 MONTHS, THE FOOD YOU BOUGHT JUST DIDN'T LAST AND YOU DIDN'T HAVE MONEY TO GET MORE.: NEVER TRUE

## 2025-03-21 ASSESSMENT — ENCOUNTER SYMPTOMS
ALLERGIC/IMMUNOLOGIC NEGATIVE: 1
RESPIRATORY NEGATIVE: 1
EYES NEGATIVE: 1
GASTROINTESTINAL NEGATIVE: 1

## 2025-03-21 ASSESSMENT — PATIENT HEALTH QUESTIONNAIRE - PHQ9
SUM OF ALL RESPONSES TO PHQ QUESTIONS 1-9: 0
1. LITTLE INTEREST OR PLEASURE IN DOING THINGS: NOT AT ALL
2. FEELING DOWN, DEPRESSED OR HOPELESS: NOT AT ALL

## 2025-03-21 NOTE — PATIENT INSTRUCTIONS
can help you develop a safe and effective exercise program.  A counselor or psychiatrist can help you cope with issues such as depression, anxiety, or family problems that can make it hard to focus on weight loss.  Consider joining a support group for people who are trying to lose weight. Your doctor can suggest groups in your area.  Where can you learn more?  Go to https://www.[employer].net/patientEd and enter U357 to learn more about \"Starting a Weight-Loss Plan: Care Instructions.\"  Current as of: April 30, 2024  Content Version: 14.4  © 2007-7395 [employer].   Care instructions adapted under license by [employer]. If you have questions about a medical condition or this instruction, always ask your healthcare professional. [employer], disclaims any warranty or liability for your use of this information.         Advance Directives: Care Instructions  Overview  An advance directive is a legal way to state your wishes at the end of your life. It tells your family and your doctor what to do if you can't say what you want.  There are two main types of advance directives. You can change them any time your wishes change.  Living will.  This form tells your family and your doctor your wishes about life support and other treatment. The form is also called a declaration.  Medical power of .  This form lets you name a person to make treatment decisions for you when you can't speak for yourself. This person is called a health care agent (health care proxy, health care surrogate). The form is also called a durable power of  for health care.  If you do not have an advance directive, decisions about your medical care may be made by a family member, or by a doctor or a  who doesn't know you.  It may help to think of an advance directive as a gift to the people who care for you. If you have one, they won't have to make tough decisions by themselves.  For more

## 2025-03-21 NOTE — PROGRESS NOTES
Subjective:      Patient ID: Bobo Hutton is a 64 y.o. male.    64-year-old -American male is presented requesting Medicare annual exam        Review of Systems   Constitutional: Negative.    HENT: Negative.     Eyes: Negative.    Respiratory: Negative.     Cardiovascular: Negative.    Gastrointestinal: Negative.    Endocrine: Negative.    Musculoskeletal:  Positive for arthralgias.   Skin: Negative.    Allergic/Immunologic: Negative.    Neurological: Negative.    Hematological: Negative.    Psychiatric/Behavioral: Negative.     Past family and social history unremarkable.   Diagnosis Orders   1. Initial Medicare annual wellness visit  CBC    Comprehensive Metabolic Panel    Hemoglobin A1C    Lipid Panel    Urinalysis    TSH      2. Benign prostatic hyperplasia without lower urinary tract symptoms        3. Erectile dysfunction due to arterial insufficiency        4. Gastroesophageal reflux disease without esophagitis        5. Tendinopathy of rotator cuff, unspecified laterality        6. YUMIKO on CPAP        7. Anxiety hyperventilation        8. Seasonal allergic rhinitis due to other allergic trigger        9. Myasthenia gravis (HCC)  CBC    Comprehensive Metabolic Panel    Hemoglobin A1C    Lipid Panel    Urinalysis    TSH      10. Essential hypertension  CBC    Comprehensive Metabolic Panel    Hemoglobin A1C    Lipid Panel    Urinalysis    TSH    PSA Screening      11. Mixed hyperlipidemia  CBC    Comprehensive Metabolic Panel    Hemoglobin A1C    Lipid Panel    Urinalysis    TSH    PSA Screening      12. History of esophageal dilatation        13. Spondylosis        14. Encounter for prostate cancer screening  PSA Screening      15. Hyperglycemia  Hemoglobin A1C            Objective:   Physical Exam  Vitals and nursing note reviewed.   Constitutional:       Appearance: He is well-developed.      Comments: Sleep   HENT:      Head: Normocephalic and atraumatic.      Right Ear: External ear normal.

## 2025-03-27 DIAGNOSIS — I10 ESSENTIAL HYPERTENSION: ICD-10-CM

## 2025-03-28 RX ORDER — AMLODIPINE BESYLATE 10 MG/1
10 TABLET ORAL EVERY MORNING
Qty: 90 TABLET | Refills: 1 | Status: SHIPPED | OUTPATIENT
Start: 2025-03-28

## 2025-03-28 NOTE — TELEPHONE ENCOUNTER
Bobo Hutton is calling to request a refill on the following medication(s):    Medication Request:  Requested Prescriptions     Pending Prescriptions Disp Refills    amLODIPine (NORVASC) 10 MG tablet [Pharmacy Med Name: amLODIPine Besylate 10 MG Oral Tablet] 90 tablet 3     Sig: TAKE 1 TABLET BY MOUTH IN THE  MORNING       Last Visit Date (If Applicable):  3/21/2025    Next Visit Date:    9/22/2025        Last refilled 10/31/2024  Rx Pending

## 2025-06-11 NOTE — PROGRESS NOTES
Hyperlipidemia     Hypertension     YUMIKO on CPAP     PONV (postoperative nausea and vomiting)     nauseated after last surgery    Unspecified sleep apnea     cpap nightly       Family History:       Problem Relation Age of Onset    Heart Disease Mother        SURGICAL HISTORY:   Past Surgical History:   Procedure Laterality Date    COLONOSCOPY      ENDOSCOPY, COLON, DIAGNOSTIC      HERNIA REPAIR  2010    ND DESTR PENIS LESN,SIMPL,SURG EXCIS N/A 7/31/2018    SUPRA PUBIC LESIONS BIOPSY EXCISION performed by Ellen Marie MD at [facility] Bilateral 07/31/2018    SUPRA PUBIC LESIONS BIOPSY EXCISION (N/A )    THROAT SURGERY  2008    TUMOR REMOVAL Right 09/23/2016    [facility]  right thigh   (benign)              Not in a hospital admission. No Known Allergies  Social History     Tobacco Use   Smoking Status Never Smoker   Smokeless Tobacco Never Used     Prior to Admission medications    Medication Sig Start Date End Date Taking? Authorizing Provider   potassium chloride (KLOR-CON M) 20 MEQ extended release tablet TAKE ONE TABLET BY MOUTH EVERY DAY 1/16/20  Yes Judith Hamilton MD   amLODIPine (NORVASC) 10 MG tablet TAKE ONE TABLET BY MOUTH EVERY DAY 12/23/19  Yes Judith Hamilton MD   pravastatin (PRAVACHOL) 40 MG tablet TAKE ONE TABLET BY MOUTH EVERY DAY 12/5/19  Yes Judith Hamilton MD   valsartan-hydrochlorothiazide (DIOVAN-HCT) 160-12.5 MG per tablet TAKE ONE TABLET BY MOUTH EVERY DAY 12/5/19  Yes Judith Hamilton MD         Physical Exam  General Appearance:    Alert, cooperative, no distress, appears stated age, Obesity [place]. Distress, very cooperative not sleeping not using any accessory muscles   Head:    Normocephalic, without obvious abnormality, atraumatic   Allergies no polyps. No sinus tenderness noted. No aspirin intolerance    Throat examination is unremarkable.     Ear examination is normal.    Eyes do not reveal any jaundice or Elías's syndrome                :    Neck:   Supple, responding well to the use of CPAP. However his machine is giving a lot of technical problems he needs a new machine. Machine is 10year-old. Patient was given a prescription for a new CPAP machine to be set at 8 cm of water. He was also given a prescription for all the supplies to go with a CPAP machine. His weight has been stable. He already had Pneumovax [place] already flu vaccine    His blood pressure is under good control he was advised to continue the same antihypertensive therapy as before. He was encouraged to continue his effort to lose weight    He is not a candidate for lung cancer screening he will non-smoker all his life. Symptoms of reflux are under good control continue the same treatment as before. He does have benign prostatic hypertrophy but does not have any lower urinary tract infection or any symptoms. Patient questions were answered. Prescriptions were provided for the new CPAP machine. We will see him in follow-up in few months    Dictated by Dr. Lisa Balderrama MD dictation over thank you his blood pressure has been stable. Nemluvio Counseling: I discussed with the patient the risks of nemolizumab including but not limited to headache, gastrointestinal complaints, nasopharyngitis, musculoskeletal complaints, injection site reactions, and allergic reactions. The patient understands that monitoring is required and they must alert us or the primary physician if any side effects are noted. Tremfya Counseling: I discussed with the patient the risks of guselkumab including but not limited to immunosuppression, serious infections, and drug reactions.  The patient understands that monitoring is required including a PPD at baseline and must alert us or the primary physician if symptoms of infection or other concerning signs are noted. Hydroxyzine Counseling: Patient advised that the medication is sedating and not to drive a car after taking this medication.  Patient informed of potential adverse effects including but not limited to dry mouth, urinary retention, and blurry vision.  The patient verbalized understanding of the proper use and possible adverse effects of hydroxyzine.  All of the patient's questions and concerns were addressed. Mirvaso Counseling: Mirvaso is a topical medication which can decrease superficial blood flow where applied. Side effects are uncommon and include stinging, redness and allergic reactions. Saxenda Pregnancy And Lactation Text: The fetal risk of this medication is unknown and taking while pregnant is not recommended. It is unknown if this medication is present in breast milk. Eucrisa Pregnancy And Lactation Text: This medication has not been assigned a Pregnancy Risk Category but animal studies failed to show danger with the topical medication. It is unknown if the medication is excreted in breast milk. Glycopyrrolate Counseling:  I discussed with the patient the risks of glycopyrrolate including but not limited to skin rash, drowsiness, dry mouth, difficulty urinating, and blurred vision. Topical Retinoid Pregnancy And Lactation Text: This medication is Pregnancy Category C. It is unknown if this medication is excreted in breast milk. Mirvaso Pregnancy And Lactation Text: This medication has not been assigned a Pregnancy Risk Category. It is unknown if the medication is excreted in breast milk. Vtama Pregnancy And Lactation Text: It is unknown if this medication can cause problems during pregnancy and breastfeeding. Tranexamic Acid Pregnancy And Lactation Text: It is unknown if this medication is safe during pregnancy or breast feeding. Amzeeq Counseling: Amzeeq is a topical antibiotic foam which contains minocycline.  The most commonly reported side effect of Amzeeq is headache.  The medication is flammable and should not be applied near a fire, flame, or while smoking.  Sun precautions is recommended to prevent sunburn. Metronidazole Pregnancy And Lactation Text: This medication is Pregnancy Category B and considered safe during pregnancy.  It is also excreted in breast milk. Ketoconazole Counseling:   Patient counseled regarding improving absorption with orange juice.  Adverse effects include but are not limited to breast enlargement, headache, diarrhea, nausea, upset stomach, liver function test abnormalities, taste disturbance, and stomach pain.  There is a rare possibility of liver failure that can occur when taking ketoconazole. The patient understands that monitoring of LFTs may be required, especially at baseline. The patient verbalized understanding of the proper use and possible adverse effects of ketoconazole.  All of the patient's questions and concerns were addressed. Bexarotene Pregnancy And Lactation Text: This medication is Pregnancy Category X and should not be given to women who are pregnant or may become pregnant. This medication should not be used if you are breast feeding. Glycopyrrolate Pregnancy And Lactation Text: This medication is Pregnancy Category B and is considered safe during pregnancy. It is unknown if it is excreted breast milk. Detail Level: Simple Hydroquinone Counseling:  Patient advised that medication may result in skin irritation, lightening (hypopigmentation), dryness, and burning.  In the event of skin irritation, the patient was advised to reduce the amount of the drug applied or use it less frequently.  Rarely, spots that are treated with hydroquinone can become darker (pseudoochronosis).  Should this occur, patient instructed to stop medication and call the office. The patient verbalized understanding of the proper use and possible adverse effects of hydroquinone.  All of the patient's questions and concerns were addressed. Hydroxyzine Pregnancy And Lactation Text: This medication is not safe during pregnancy and should not be taken. It is also excreted in breast milk and breast feeding isn't recommended. Valtrex Counseling: I discussed with the patient the risks of valacyclovir including but not limited to kidney damage, nausea, vomiting and severe allergy.  The patient understands that if the infection seems to be worsening or is not improving, they are to call. Nemluvio Pregnancy And Lactation Text: It is not known if Nemluvio causes fetal harm or is present in breast milk. Please proceed with caution if patients who are pregnant or breastfeeding. Semaglutide Counseling: I reviewed the possible side effects including: thyroid tumors, kidney disease, gallbladder disease, abdominal pain, constipation, diarrhea, nausea, vomiting and pancreatitis. Do not take this medication if you have a history or family history of multiple endocrine neoplasia syndrome type 2. Side effects reviewed, pt to contact office should one occur. Cantharidin Pregnancy And Lactation Text: This medication has not been proven safe during pregnancy. It is unknown if this medication is excreted in breast milk. Isotretinoin Counseling: Patient should get monthly blood tests, not donate blood, not drive at night if vision affected, not share medication, and not undergo elective surgery for 6 months after tx completed. Side effects reviewed, pt to contact office should one occur. Zoryve Counseling:  I discussed with the patient that Zoryve is not for use in the eyes, mouth or vagina. The most commonly reported side effects include diarrhea, headache, insomnia, application site pain, upper respiratory tract infections, and urinary tract infections.  All of the patient's questions and concerns were addressed. Amzeeq Pregnancy And Lactation Text: It is not recommended to use the product if you are pregnant. Minocycline Counseling: Patient advised regarding possible photosensitivity and discoloration of the teeth, skin, lips, tongue and gums.  Patient instructed to avoid sunlight, if possible.  When exposed to sunlight, patients should wear protective clothing, sunglasses, and sunscreen.  The patient was instructed to call the office immediately if the following severe adverse effects occur:  hearing changes, easy bruising/bleeding, severe headache, or vision changes.  The patient verbalized understanding of the proper use and possible adverse effects of minocycline.  All of the patient's questions and concerns were addressed. Opzelura Counseling:  I discussed with the patient the risks of Opzelura including but not limited to nasopharngitis, bronchitis, ear infection, eosinophila, hives, diarrhea, folliculitis, tonsillitis, and rhinorrhea.  Taken orally, this medication has been linked to serious infections; higher rate of mortality; malignancy and lymphoproliferative disorders; major adverse cardiovascular events; thrombosis; thrombocytopenia, anemia, and neutropenia; and lipid elevations. Tazorac Counseling:  Patient advised that medication is irritating and drying.  Patient may need to apply sparingly and wash off after an hour before eventually leaving it on overnight.  The patient verbalized understanding of the proper use and possible adverse effects of tazorac.  All of the patient's questions and concerns were addressed. Ketoconazole Pregnancy And Lactation Text: This medication is Pregnancy Category C and it isn't know if it is safe during pregnancy. It is also excreted in breast milk and breast feeding isn't recommended. Cibinqo Counseling: I discussed with the patient the risks of Cibinqo therapy including but not limited to common cold, nausea, headache, cold sores, increased blood CPK levels, dizziness, UTIs, fatigue, acne, and vomitting. Live vaccines should be avoided.  This medication has been linked to serious infections; higher rate of mortality; malignancy and lymphoproliferative disorders; major adverse cardiovascular events; thrombosis; thrombocytopenia and lymphopenia; lipid elevations; and retinal detachment. Valtrex Pregnancy And Lactation Text: this medication is Pregnancy Category B and is considered safe during pregnancy. This medication is not directly found in breast milk but it's metabolite acyclovir is present. Minocycline Pregnancy And Lactation Text: This medication is Pregnancy Category D and not consider safe during pregnancy. It is also excreted in breast milk. Rituxan Counseling:  I discussed with the patient the risks of Rituxan infusions. Side effects can include infusion reactions, severe drug rashes including mucocutaneous reactions, reactivation of latent hepatitis and other infections and rarely progressive multifocal leukoencephalopathy.  All of the patient's questions and concerns were addressed. Terbinafine Counseling: Patient counseling regarding adverse effects of terbinafine including but not limited to headache, diarrhea, rash, upset stomach, liver function test abnormalities, itching, taste/smell disturbance, nausea, abdominal pain, and flatulence.  There is a rare possibility of liver failure that can occur when taking terbinafine.  The patient understands that a baseline LFT and kidney function test may be required. The patient verbalized understanding of the proper use and possible adverse effects of terbinafine.  All of the patient's questions and concerns were addressed. Azelaic Acid Counseling: Patient counseled that medicine may cause skin irritation and to avoid applying near the eyes.  In the event of skin irritation, the patient was advised to reduce the amount of the drug applied or use it less frequently.   The patient verbalized understanding of the proper use and possible adverse effects of azelaic acid.  All of the patient's questions and concerns were addressed. Hydroxychloroquine Counseling:  I discussed with the patient that a baseline ophthalmologic exam is needed at the start of therapy and every year thereafter while on therapy. A CBC may also be warranted for monitoring.  The side effects of this medication were discussed with the patient, including but not limited to agranulocytosis, aplastic anemia, seizures, rashes, retinopathy, and liver toxicity. Patient instructed to call the office should any adverse effect occur.  The patient verbalized understanding of the proper use and possible adverse effects of Plaquenil.  All the patient's questions and concerns were addressed. Include Pregnancy/Lactation Warning?: Add Automatically Based on Childbearing Potential and Patient Age Opzelura Pregnancy And Lactation Text: There is insufficient data to evaluate drug-associated risk for major birth defects, miscarriage, or other adverse maternal or fetal outcomes.  There is a pregnancy registry that monitors pregnancy outcomes in pregnant persons exposed to the medication during pregnancy.  It is unknown if this medication is excreted in breast milk.  Do not breastfeed during treatment and for about 4 weeks after the last dose. Isotretinoin Pregnancy And Lactation Text: This medication is Pregnancy Category X and is considered extremely dangerous during pregnancy. It is unknown if it is excreted in breast milk. Nsaids Pregnancy And Lactation Text: These medications are considered safe up to 30 weeks gestation. It is excreted in breast milk. Tazorac Pregnancy And Lactation Text: This medication is not safe during pregnancy. It is unknown if this medication is excreted in breast milk. Rituxan Pregnancy And Lactation Text: This medication is Pregnancy Category C and it isn't know if it is safe during pregnancy. It is unknown if this medication is excreted in breast milk but similar antibodies are known to be excreted. Dutasteride Male Counseling: Dustasteride Counseling:  I discussed with the patient the risks of use of dutasteride including but not limited to decreased libido, decreased ejaculate volume, and gynecomastia. Women who can become pregnant should not handle medication.  All of the patient's questions and concerns were addressed. Use Enhanced Medication Counseling?: No Azelaic Acid Pregnancy And Lactation Text: This medication is considered safe during pregnancy and breast feeding. Topical Clindamycin Counseling: Patient counseled that this medication may cause skin irritation or allergic reactions.  In the event of skin irritation, the patient was advised to reduce the amount of the drug applied or use it less frequently.   The patient verbalized understanding of the proper use and possible adverse effects of clindamycin.  All of the patient's questions and concerns were addressed. Olanzapine Counseling- I discussed with the patient the common side effects of olanzapine including but are not limited to: lack of energy, dry mouth, increased appetite, sleepiness, tremor, constipation, dizziness, changes in behavior, or restlessness.  Explained that teenagers are more likely to experience headaches, abdominal pain, pain in the arms or legs, tiredness, and sleepiness.  Serious side effects include but are not limited: increased risk of death in elderly patients who are confused, have memory loss, or dementia-related psychosis; hyperglycemia; increased cholesterol and triglycerides; and weight gain. High Dose Vitamin A Counseling: Side effects reviewed, pt to contact office should one occur. Dutasteride Female Counseling: Dutasteride Counseling:  I discussed with the patient the risks of use of dutasteride including but not limited to decreased libido and sexual dysfunction. Explained the teratogenic nature of the medication and stressed the importance of not getting pregnant during treatment. All of the patient's questions and concerns were addressed. Wegovy Counseling: I reviewed the possible side effects including: thyroid tumors, kidney disease, gallbladder disease, abdominal pain, constipation, diarrhea, nausea, vomiting and pancreatitis. Do not take this medication if you have a history or family history of multiple endocrine neoplasia syndrome type 2. Side effects reviewed, pt to contact office should one occur. Cibinqo Pregnancy And Lactation Text: It is unknown if this medication will adversely affect pregnancy or breast feeding.  You should not take this medication if you are currently pregnant or planning a pregnancy or while breastfeeding. Hydroxychloroquine Pregnancy And Lactation Text: This medication has been shown to cause fetal harm but it isn't assigned a Pregnancy Risk Category. There are small amounts excreted in breast milk. Quinolones Counseling:  I discussed with the patient the risks of fluoroquinolones including but not limited to GI upset, allergic reaction, drug rash, diarrhea, dizziness, photosensitivity, yeast infections, liver function test abnormalities, tendonitis/tendon rupture. Imiquimod Counseling:  I discussed with the patient the risks of imiquimod including but not limited to erythema, scaling, itching, weeping, crusting, and pain.  Patient understands that the inflammatory response to imiquimod is variable from person to person and was educated regarded proper titration schedule.  If flu-like symptoms develop, patient knows to discontinue the medication and contact us. Zyclara Counseling:  I discussed with the patient the risks of imiquimod including but not limited to erythema, scaling, itching, weeping, crusting, and pain.  Patient understands that the inflammatory response to imiquimod is variable from person to person and was educated regarded proper titration schedule.  If flu-like symptoms develop, patient knows to discontinue the medication and contact us. Siliq Counseling:  I discussed with the patient the risks of Siliq including but not limited to new or worsening depression, suicidal thoughts and behavior, immunosuppression, malignancy, posterior leukoencephalopathy syndrome, and serious infections.  The patient understands that monitoring is required including a PPD at baseline and must alert us or the primary physician if symptoms of infection or other concerning signs are noted. There is also a special program designed to monitor depression which is required with Siliq. Sotyktu Counseling:  I discussed the most common side effects of Sotyktu including: common cold, sore throat, sinus infections, cold sores, canker sores, folliculitis, and acne.? I also discussed more serious side effects of Sotyktu including but not limited to: serious allergic reactions; increased risk for infections such as TB; cancers such as lymphomas; rhabdomyolysis and elevated CPK; and elevated triglycerides and liver enzymes.? Picato Counseling:  I discussed with the patient the risks of Picato including but not limited to erythema, scaling, itching, weeping, crusting, and pain. Low Dose Naltrexone Counseling- I discussed with the patient the potential risks and side effects of low dose naltrexone including but not limited to: more vivid dreams, headaches, nausea, vomiting, abdominal pain, fatigue, dizziness, and anxiety. Terbinafine Pregnancy And Lactation Text: This medication is Pregnancy Category B and is considered safe during pregnancy. It is also excreted in breast milk and breast feeding isn't recommended. Fluconazole Pregnancy And Lactation Text: This medication is Pregnancy Category C and it isn't know if it is safe during pregnancy. It is also excreted in breast milk. Ivermectin Counseling:  Patient instructed to take medication on an empty stomach with a full glass of water.  Patient informed of potential adverse effects including but not limited to nausea, diarrhea, dizziness, itching, and swelling of the extremities or lymph nodes.  The patient verbalized understanding of the proper use and possible adverse effects of ivermectin.  All of the patient's questions and concerns were addressed. Methotrexate Counseling:  Patient counseled regarding adverse effects of methotrexate including but not limited to nausea, vomiting, abnormalities in liver function tests. Patients may develop mouth sores, rash, diarrhea, and abnormalities in blood counts. The patient understands that monitoring is required including LFT's and blood counts.  There is a rare possibility of scarring of the liver and lung problems that can occur when taking methotrexate. Persistent nausea, loss of appetite, pale stools, dark urine, cough, and shortness of breath should be reported immediately. Patient advised to discontinue methotrexate treatment at least three months before attempting to become pregnant.  I discussed the need for folate supplements while taking methotrexate.  These supplements can decrease side effects during methotrexate treatment. The patient verbalized understanding of the proper use and possible adverse effects of methotrexate.  All of the patient's questions and concerns were addressed. Cosentyx Counseling:  I discussed with the patient the risks of Cosentyx including but not limited to worsening of Crohn's disease, immunosuppression, allergic reactions and infections.  The patient understands that monitoring is required including a PPD at baseline and must alert us or the primary physician if symptoms of infection or other concerning signs are noted. Colchicine Pregnancy And Lactation Text: This medication is Pregnancy Category C and isn't considered safe during pregnancy. It is excreted in breast milk. Solaraze Counseling:  I discussed with the patient the risks of Solaraze including but not limited to erythema, scaling, itching, weeping, crusting, and pain. Doxycycline Counseling:  Patient counseled regarding possible photosensitivity and increased risk for sunburn.  Patient instructed to avoid sunlight, if possible.  When exposed to sunlight, patients should wear protective clothing, sunglasses, and sunscreen.  The patient was instructed to call the office immediately if the following severe adverse effects occur:  hearing changes, easy bruising/bleeding, severe headache, or vision changes.  The patient verbalized understanding of the proper use and possible adverse effects of doxycycline.  All of the patient's questions and concerns were addressed. Drysol Counseling:  I discussed with the patient the risks of drysol/aluminum chloride including but not limited to skin rash, itching, irritation, burning. SSKI Counseling:  I discussed with the patient the risks of SSKI including but not limited to thyroid abnormalities, metallic taste, GI upset, fever, headache, acne, arthralgias, paraesthesias, lymphadenopathy, easy bleeding, arrhythmias, and allergic reaction. Wartpeel Counseling:  I discussed with the patient the risks of Wartpeel including but not limited to erythema, scaling, itching, weeping, crusting, and pain. Cosentyx Pregnancy And Lactation Text: This medication is Pregnancy Category B and is considered safe during pregnancy. It is unknown if this medication is excreted in breast milk. Ilumya Counseling: I discussed with the patient the risks of tildrakizumab including but not limited to immunosuppression, malignancy, posterior leukoencephalopathy syndrome, and serious infections.  The patient understands that monitoring is required including a PPD at baseline and must alert us or the primary physician if symptoms of infection or other concerning signs are noted. Stelara Counseling:  I discussed with the patient the risks of ustekinumab including but not limited to immunosuppression, malignancy, posterior leukoencephalopathy syndrome, and serious infections.  The patient understands that monitoring is required including a PPD at baseline and must alert us or the primary physician if symptoms of infection or other concerning signs are noted. Cimetidine Counseling:  I discussed with the patient the risks of Cimetidine including but not limited to gynecomastia, headache, diarrhea, nausea, drowsiness, arrhythmias, pancreatitis, skin rashes, psychosis, bone marrow suppression and kidney toxicity. Dapsone Counseling: I discussed with the patient the risks of dapsone including but not limited to hemolytic anemia, agranulocytosis, rashes, methemoglobinemia, kidney failure, peripheral neuropathy, headaches, GI upset, and liver toxicity.  Patients who start dapsone require monitoring including baseline LFTs and weekly CBCs for the first month, then every month thereafter.  The patient verbalized understanding of the proper use and possible adverse effects of dapsone.  All of the patient's questions and concerns were addressed. Doxycycline Pregnancy And Lactation Text: This medication is Pregnancy Category D and not consider safe during pregnancy. It is also excreted in breast milk but is considered safe for shorter treatment courses. Wartpeel Pregnancy And Lactation Text: This medication is Pregnancy Category X and contraindicated in pregnancy and in women who may become pregnant. It is unknown if this medication is excreted in breast milk. Methotrexate Pregnancy And Lactation Text: This medication is Pregnancy Category X and is known to cause fetal harm. This medication is excreted in breast milk. Sski Pregnancy And Lactation Text: This medication is Pregnancy Category D and isn't considered safe during pregnancy. It is excreted in breast milk. Solaraze Pregnancy And Lactation Text: This medication is Pregnancy Category B and is considered safe. There is some data to suggest avoiding during the third trimester. It is unknown if this medication is excreted in breast milk. Ivermectin Pregnancy And Lactation Text: This medication is Pregnancy Category C and it isn't known if it is safe during pregnancy. It is also excreted in breast milk. Griseofulvin Counseling:  I discussed with the patient the risks of griseofulvin including but not limited to photosensitivity, cytopenia, liver damage, nausea/vomiting and severe allergy.  The patient understands that this medication is best absorbed when taken with a fatty meal (e.g., ice cream or french fries). Dapsone Pregnancy And Lactation Text: This medication is Pregnancy Category C and is not considered safe during pregnancy or breast feeding. Dupixent Counseling: I discussed with the patient the risks of dupilumab including but not limited to eye infection and irritation, cold sores, injection site reactions, worsening of asthma, allergic reactions and increased risk of parasitic infection.  Live vaccines should be avoided while taking dupilumab. Dupilumab will also interact with certain medications such as warfarin and cyclosporine. The patient understands that monitoring is required and they must alert us or the primary physician if symptoms of infection or other concerning signs are noted. Elidel Counseling: Patient may experience a mild burning sensation during topical application. Elidel is not approved in children less than 2 years of age. There have been case reports of hematologic and skin malignancies in patients using topical calcineurin inhibitors although causality is questionable. Winlevi Counseling:  I discussed with the patient the risks of topical clascoterone including but not limited to erythema, scaling, itching, and stinging. Patient voiced their understanding. Thalidomide Counseling: I discussed with the patient the risks of thalidomide including but not limited to birth defects, anxiety, weakness, chest pain, dizziness, cough and severe allergy. Erythromycin Counseling:  I discussed with the patient the risks of erythromycin including but not limited to GI upset, allergic reaction, drug rash, diarrhea, increase in liver enzymes, and yeast infections. Ilumya Pregnancy And Lactation Text: The risk during pregnancy and breastfeeding is uncertain with this medication. Griseofulvin Pregnancy And Lactation Text: This medication is Pregnancy Category X and is known to cause serious birth defects. It is unknown if this medication is excreted in breast milk but breast feeding should be avoided. Prednisone Counseling:  I discussed with the patient the risks of prolonged use of prednisone including but not limited to weight gain, insomnia, osteoporosis, mood changes, diabetes, susceptibility to infection, glaucoma and high blood pressure.  In cases where prednisone use is prolonged, patients should be monitored with blood pressure checks, serum glucose levels and an eye exam.  Additionally, the patient may need to be placed on GI prophylaxis, PCP prophylaxis, and calcium and vitamin D supplementation and/or a bisphosphonate.  The patient verbalized understanding of the proper use and the possible adverse effects of prednisone.  All of the patient's questions and concerns were addressed. Opioid Counseling: I discussed with the patient the potential side effects of opioids including but not limited to addiction, altered mental status, and depression. I stressed avoiding alcohol, benzodiazepines, muscle relaxants and sleep aids unless specifically okayed by a physician. The patient verbalized understanding of the proper use and possible adverse effects of opioids. All of the patient's questions and concerns were addressed. They were instructed to flush the remaining pills down the toilet if they did not need them for pain. Ozempic Counseling: I reviewed the possible side effects including: thyroid tumors, kidney disease, gallbladder disease, abdominal pain, constipation, diarrhea, nausea, vomiting and pancreatitis. Do not take this medication if you have a history or family history of multiple endocrine neoplasia syndrome type 2. Side effects reviewed, pt to contact office should one occur. Soolantra Counseling: I discussed with the patients the risks of topial Soolantra. This is a medicine which decreases the number of mites and inflammation in the skin. You experience burning, stinging, eye irritation or allergic reactions.  Please call our office if you develop any problems from using this medication. Opioid Pregnancy And Lactation Text: These medications can lead to premature delivery and should be avoided during pregnancy. These medications are also present in breast milk in small amounts. Taltz Counseling: I discussed with the patient the risks of ixekizumab including but not limited to immunosuppression, serious infections, worsening of inflammatory bowel disease and drug reactions.  The patient understands that monitoring is required including a PPD at baseline and must alert us or the primary physician if symptoms of infection or other concerning signs are noted. Acitretin Counseling:  I discussed with the patient the risks of acitretin including but not limited to hair loss, dry lips/skin/eyes, liver damage, hyperlipidemia, depression/suicidal ideation, photosensitivity.  Serious rare side effects can include but are not limited to pancreatitis, pseudotumor cerebri, bony changes, clot formation/stroke/heart attack.  Patient understands that alcohol is contraindicated since it can result in liver toxicity and significantly prolong the elimination of the drug by many years. Infliximab Counseling:  I discussed with the patient the risks of infliximab including but not limited to myelosuppression, immunosuppression, autoimmune hepatitis, demyelinating diseases, lymphoma, and serious infections.  The patient understands that monitoring is required including a PPD at baseline and must alert us or the primary physician if symptoms of infection or other concerning signs are noted. Acitretin Pregnancy And Lactation Text: This medication is Pregnancy Category X and should not be given to women who are pregnant or may become pregnant in the future. This medication is excreted in breast milk. Thalidomide Pregnancy And Lactation Text: This medication is Pregnancy Category X and is absolutely contraindicated during pregnancy. It is unknown if it is excreted in breast milk. Aklief counseling:  Patient advised to apply a pea-sized amount only at bedtime and wait 30 minutes after washing their face before applying.  If too drying, patient may add a non-comedogenic moisturizer.  The most commonly reported side effects including irritation, redness, scaling, dryness, stinging, burning, itching, and increased risk of sunburn.  The patient verbalized understanding of the proper use and possible adverse effects of retinoids.  All of the patient's questions and concerns were addressed. Erythromycin Pregnancy And Lactation Text: This medication is Pregnancy Category B and is considered safe during pregnancy. It is also excreted in breast milk. Winlevi Pregnancy And Lactation Text: This medication is considered safe during pregnancy and breastfeeding. Itraconazole Counseling:  I discussed with the patient the risks of itraconazole including but not limited to liver damage, nausea/vomiting, neuropathy, and severe allergy.  The patient understands that this medication is best absorbed when taken with acidic beverages such as non-diet cola or ginger ale.  The patient understands that monitoring is required including baseline LFTs and repeat LFTs at intervals.  The patient understands that they are to contact us or the primary physician if concerning signs are noted. Doxepin Counseling:  Patient advised that the medication is sedating and not to drive a car after taking this medication. Patient informed of potential adverse effects including but not limited to dry mouth, urinary retention, and blurry vision.  The patient verbalized understanding of the proper use and possible adverse effects of doxepin.  All of the patient's questions and concerns were addressed. Gabapentin Counseling: I discussed with the patient the risks of gabapentin including but not limited to dizziness, somnolence, fatigue and ataxia. Doxepin Pregnancy And Lactation Text: This medication is Pregnancy Category C and it isn't known if it is safe during pregnancy. It is also excreted in breast milk and breast feeding isn't recommended. Saxenda Counseling: I reviewed the possible side effects including: thyroid tumors, kidney disease, gallbladder disease, abdominal pain, constipation, diarrhea, nausea, vomiting and pancreatitis. Do not take this medication if you have a history or family history of multiple endocrine neoplasia syndrome type 2. Side effects reviewed, pt to contact office should one occur. Soolantra Pregnancy And Lactation Text: This medication is Pregnancy Category C. This medication is considered safe during breast feeding. Tranexamic Acid Counseling:  Patient advised of the small risk of bleeding problems with tranexamic acid. They were also instructed to call if they developed any nausea, vomiting or diarrhea. All of the patient's questions and concerns were addressed. Aklief Pregnancy And Lactation Text: It is unknown if this medication is safe to use during pregnancy.  It is unknown if this medication is excreted in breast milk.  Breastfeeding women should use the topical cream on the smallest area of the skin for the shortest time needed while breastfeeding.  Do not apply to nipple and areola. Bexarotene Counseling:  I discussed with the patient the risks of bexarotene including but not limited to hair loss, dry lips/skin/eyes, liver abnormalities, hyperlipidemia, pancreatitis, depression/suicidal ideation, photosensitivity, drug rash/allergic reactions, hypothyroidism, anemia, leukopenia, infection, cataracts, and teratogenicity.  Patient understands that they will need regular blood tests to check lipid profile, liver function tests, white blood cell count, thyroid function tests and pregnancy test if applicable. Topical Retinoid counseling:  Patient advised to apply a pea-sized amount only at bedtime and wait 30 minutes after washing their face before applying.  If too drying, patient may add a non-comedogenic moisturizer. The patient verbalized understanding of the proper use and possible adverse effects of retinoids.  All of the patient's questions and concerns were addressed. Metronidazole Counseling:  I discussed with the patient the risks of metronidazole including but not limited to seizures, nausea/vomiting, a metallic taste in the mouth, nausea/vomiting and severe allergy. Eucrisa Counseling: Patient may experience a mild burning sensation during topical application. Eucrisa is not approved in children less than 2 years of age. VTAMA Counseling: I discussed with the patient that VTAMA is not for use in the eyes, mouth or mouth. They should call the office if they develop any signs of allergic reactions to VTAMA. The patient verbalized understanding of the proper use and possible adverse effects of VTAMA.  All of the patient's questions and concerns were addressed. Odomzo Counseling- I discussed with the patient the risks of Odomzo including but not limited to nausea, vomiting, diarrhea, constipation, weight loss, changes in the sense of taste, decreased appetite, muscle spasms, and hair loss.  The patient verbalized understanding of the proper use and possible adverse effects of Odomzo.  All of the patient's questions and concerns were addressed. Minoxidil Counseling: Minoxidil is a topical medication which can increase blood flow where it is applied. It is uncertain how this medication increases hair growth. Side effects are uncommon and include stinging and allergic reactions. Rinvoq Pregnancy And Lactation Text: Based on animal studies, Rinvoq may cause embryo-fetal harm when administered to pregnant women.  The medication should not be used in pregnancy.  Breastfeeding is not recommended during treatment and for 6 days after the last dose. Calcipotriene Pregnancy And Lactation Text: The use of this medication during pregnancy or lactation is not recommended as there is insufficient data. Qbrexza Pregnancy And Lactation Text: There is no available data on Qbrexza use in pregnant women.  There is no available data on Qbrexza use in lactation. Finasteride Pregnancy And Lactation Text: This medication is absolutely contraindicated during pregnancy. It is unknown if it is excreted in breast milk. Prednisone Pregnancy And Lactation Text: This medication is Pregnancy Category C and it isn't know if it is safe during pregnancy. This medication is excreted in breast milk. Birth Control Pills Counseling: Birth Control Pill Counseling: I discussed with the patient the potential side effects of OCPs including but not limited to increased risk of stroke, heart attack, thrombophlebitis, deep venous thrombosis, hepatic adenomas, breast changes, GI upset, headaches, and depression.  The patient verbalized understanding of the proper use and possible adverse effects of OCPs. All of the patient's questions and concerns were addressed. Cephalexin Counseling: I counseled the patient regarding use of cephalexin as an antibiotic for prophylactic and/or therapeutic purposes. Cephalexin (commonly prescribed under brand name Keflex) is a cephalosporin antibiotic which is active against numerous classes of bacteria, including most skin bacteria. Side effects may include nausea, diarrhea, gastrointestinal upset, rash, hives, yeast infections, and in rare cases, hepatitis, kidney disease, seizures, fever, confusion, neurologic symptoms, and others. Patients with severe allergies to penicillin medications are cautioned that there is about a 10% incidence of cross-reactivity with cephalosporins. When possible, patients with penicillin allergies should use alternatives to cephalosporins for antibiotic therapy. Bimzelx Counseling:  I discussed with the patient the risks of Bimzelx including but not limited to depression, immunosuppression, allergic reactions and infections.  The patient understands that monitoring is required including a PPD at baseline and must alert us or the primary physician if symptoms of infection or other concerning signs are noted. Cyclophosphamide Counseling:  I discussed with the patient the risks of cyclophosphamide including but not limited to hair loss, hormonal abnormalities, decreased fertility, abdominal pain, diarrhea, nausea and vomiting, bone marrow suppression and infection. The patient understands that monitoring is required while taking this medication. Tetracycline Counseling: Patient counseled regarding possible photosensitivity and increased risk for sunburn.  Patient instructed to avoid sunlight, if possible.  When exposed to sunlight, patients should wear protective clothing, sunglasses, and sunscreen.  The patient was instructed to call the office immediately if the following severe adverse effects occur:  hearing changes, easy bruising/bleeding, severe headache, or vision changes.  The patient verbalized understanding of the proper use and possible adverse effects of tetracycline.  All of the patient's questions and concerns were addressed. Patient understands to avoid pregnancy while on therapy due to potential birth defects. Rhofade Counseling: Rhofade is a topical medication which can decrease superficial blood flow where applied. Side effects are uncommon and include stinging, redness and allergic reactions. Topical Steroids Counseling: I discussed with the patient that prolonged use of topical steroids can result in the increased appearance of superficial blood vessels (telangiectasias), lightening (hypopigmentation) and thinning of the skin (atrophy).  Patient understands to avoid using high potency steroids in skin folds, the groin or the face.  The patient verbalized understanding of the proper use and possible adverse effects of topical steroids.  All of the patient's questions and concerns were addressed. Oxybutynin Counseling:  I discussed with the patient the risks of oxybutynin including but not limited to skin rash, drowsiness, dry mouth, difficulty urinating, and blurred vision. Bimzelx Pregnancy And Lactation Text: This medication crosses the placenta and the safety is uncertain during pregnancy. It is unknown if this medication is present in breast milk. Cantharidin Counseling:  I discussed with the patient the risks of Cantharidin including but not limited to pain, redness, burning, itching, and blistering. Clofazimine Counseling:  I discussed with the patient the risks of clofazimine including but not limited to skin and eye pigmentation, liver damage, nausea/vomiting, gastrointestinal bleeding and allergy. Cyclophosphamide Pregnancy And Lactation Text: This medication is Pregnancy Category D and it isn't considered safe during pregnancy. This medication is excreted in breast milk. Xeljanz Counseling: I discussed with the patient the risks of Xeljanz therapy including increased risk of infection, liver issues, headache, diarrhea, or cold symptoms. Live vaccines should be avoided. They were instructed to call if they have any problems. Humira Counseling:  I discussed with the patient the risks of adalimumab including but not limited to myelosuppression, immunosuppression, autoimmune hepatitis, demyelinating diseases, lymphoma, and serious infections.  The patient understands that monitoring is required including a PPD at baseline and must alert us or the primary physician if symptoms of infection or other concerning signs are noted. Skyrizi Counseling: I discussed with the patient the risks of risankizumab-rzaa including but not limited to immunosuppression, and serious infections.  The patient understands that monitoring is required including a PPD at baseline and must alert us or the primary physician if symptoms of infection or other concerning signs are noted. Birth Control Pills Pregnancy And Lactation Text: This medication should be avoided if pregnant and for the first 30 days post-partum. Cephalexin Pregnancy And Lactation Text: This medication is Pregnancy Category B and considered safe during pregnancy.  It is also excreted in breast milk but can be used safely for shorter doses. Topical Steroids Applications Pregnancy And Lactation Text: Most topical steroids are considered safe to use during pregnancy and lactation.  Any topical steroid applied to the breast or nipple should be washed off before breastfeeding. Oxybutynin Pregnancy And Lactation Text: This medication is Pregnancy Category B and is considered safe during pregnancy. It is unknown if it is excreted in breast milk. 5-Fu Counseling: 5-Fluorouracil Counseling:  I discussed with the patient the risks of 5-fluorouracil including but not limited to erythema, scaling, itching, weeping, crusting, and pain. Xelrosyz Pregnancy And Lactation Text: This medication is Pregnancy Category D and is not considered safe during pregnancy.  The risk during breast feeding is also uncertain. Topical Sulfur Applications Counseling: Topical Sulfur Counseling: Patient counseled that this medication may cause skin irritation or allergic reactions.  In the event of skin irritation, the patient was advised to reduce the amount of the drug applied or use it less frequently.   The patient verbalized understanding of the proper use and possible adverse effects of topical sulfur application.  All of the patient's questions and concerns were addressed. Cyclosporine Counseling:  I discussed with the patient the risks of cyclosporine including but not limited to hypertension, gingival hyperplasia,myelosuppression, immunosuppression, liver damage, kidney damage, neurotoxicity, lymphoma, and serious infections. The patient understands that monitoring is required including baseline blood pressure, CBC, CMP, lipid panel and uric acid, and then 1-2 times monthly CMP and blood pressure. Clindamycin Counseling: I counseled the patient regarding use of clindamycin as an antibiotic for prophylactic and/or therapeutic purposes. Clindamycin is active against numerous classes of bacteria, including skin bacteria. Side effects may include nausea, diarrhea, gastrointestinal upset, rash, hives, yeast infections, and in rare cases, colitis. Propranolol Counseling:  I discussed with the patient the risks of propranolol including but not limited to low heart rate, low blood pressure, low blood sugar, restlessness and increased cold sensitivity. They should call the office if they experience any of these side effects. Cimzia Counseling:  I discussed with the patient the risks of Cimzia including but not limited to immunosuppression, allergic reactions and infections.  The patient understands that monitoring is required including a PPD at baseline and must alert us or the primary physician if symptoms of infection or other concerning signs are noted. Spironolactone Counseling: Patient advised regarding risks of diarrhea, abdominal pain, hyperkalemia, birth defects (for female patients), liver toxicity and renal toxicity. The patient may need blood work to monitor liver and kidney function and potassium levels while on therapy. The patient verbalized understanding of the proper use and possible adverse effects of spironolactone.  All of the patient's questions and concerns were addressed. Over the Counter Salicylic Acid Counseling: Over the counter salicylic acid preparations can be used effectively to treat warts at home. There are two types of application: liquid and plaster. Liquid preparations are applied like nail polish and the plaster applications are applied like a bandage (you may need to apply duct tape over the plaster to keep it in place). Dead and macerated skin should be removed regularly with a nail file or nail clippers for best results. Spevigo Counseling: I discussed with the patient the risks of Spevigo including but not limited to fatigue, nasuea, vomiting, headache, pruritus, urinary tract infection, an infusion related reactions.  The patient understands that monitoring is required including screening for tuberculosis at baseline and yearly screening thereafter while continuing Spevigo therapy. They should contact us if symptoms of infection or other concerning signs are noted. Xolair Counseling:  Patient informed of potential adverse effects including but not limited to fever, muscle aches, rash and allergic reactions.  The patient verbalized understanding of the proper use and possible adverse effects of Xolair.  All of the patient's questions and concerns were addressed. Albendazole Counseling:  I discussed with the patient the risks of albendazole including but not limited to cytopenia, kidney damage, nausea/vomiting and severe allergy.  The patient understands that this medication is being used in an off-label manner. Hyrimoz Counseling:  I discussed with the patient the risks of adalimumab including but not limited to myelosuppression, immunosuppression, autoimmune hepatitis, demyelinating diseases, lymphoma, and serious infections.  The patient understands that monitoring is required including a PPD at baseline and must alert us or the primary physician if symptoms of infection or other concerning signs are noted. Fluconazole Counseling:  Patient counseled regarding adverse effects of fluconazole including but not limited to headache, diarrhea, nausea, upset stomach, liver function test abnormalities, taste disturbance, and stomach pain.  There is a rare possibility of liver failure that can occur when taking fluconazole.  The patient understands that monitoring of LFTs and kidney function test may be required, especially at baseline. The patient verbalized understanding of the proper use and possible adverse effects of fluconazole.  All of the patient's questions and concerns were addressed. Propranolol Pregnancy And Lactation Text: This medication is Pregnancy Category C and it isn't known if it is safe during pregnancy. It is excreted in breast milk. Topical Sulfur Applications Pregnancy And Lactation Text: This medication is considered safe during pregnancy and breast feeding secondary to limited systemic absorption. Xolair Pregnancy And Lactation Text: This medication is Pregnancy Category B and is considered safe during pregnancy. This medication is excreted in breast milk. Colchicine Counseling:  Patient counseled regarding adverse effects including but not limited to stomach upset (nausea, vomiting, stomach pain, or diarrhea).  Patient instructed to limit alcohol consumption while taking this medication.  Colchicine may reduce blood counts especially with prolonged use.  The patient understands that monitoring of kidney function and blood counts may be required, especially at baseline. The patient verbalized understanding of the proper use and possible adverse effects of colchicine.  All of the patient's questions and concerns were addressed. Cimzia Pregnancy And Lactation Text: This medication crosses the placenta but can be considered safe in certain situations. Cimzia may be excreted in breast milk. Clindamycin Pregnancy And Lactation Text: This medication can be used in pregnancy if certain situations. Clindamycin is also present in breast milk. Spevigo Pregnancy And Lactation Text: The risk during pregnancy and breastfeeding is uncertain with this medication. This medication does cross the placenta. It is unknown if this medication is found in breast milk. Over The Counter Salicylic Acid Pregnancy And Lactation Text: It is not recommended to use high dose OTC salicylic acid while pregnant. Lower dose topical preparations are considered safe. Spironolactone Pregnancy And Lactation Text: This medication can cause feminization of the male fetus and should be avoided during pregnancy. The active metabolite is also found in breast milk. Dutasteride Pregnancy And Lactation Text: This medication is absolutely contraindicated in women, especially during pregnancy and breast feeding. Feminization of male fetuses is possible if taking while pregnant. Benzoyl Peroxide Counseling: Patient counseled that medicine may cause skin irritation and bleach clothing.  In the event of skin irritation, the patient was advised to reduce the amount of the drug applied or use it less frequently.   The patient verbalized understanding of the proper use and possible adverse effects of benzoyl peroxide.  All of the patient's questions and concerns were addressed. Litfulo Counseling: I discussed with the patient the risks of Litfulo therapy including but not limited to upper respiratory tract infections, shingles, cold sores, and nausea. Live vaccines should be avoided.  This medication has been linked to serious infections; higher rate of mortality; malignancy and lymphoproliferative disorders; major adverse cardiovascular events; thrombosis; gastrointestinal perforations; neutropenia; lymphopenia; anemia; liver enzyme elevations; and lipid elevations. High Dose Vitamin A Pregnancy And Lactation Text: High dose vitamin A therapy is contraindicated during pregnancy and breast feeding. Olanzapine Pregnancy And Lactation Text: This medication is pregnancy category C.   There are no adequate and well controlled trials with olanzapine in pregnant females.  Olanzapine should be used during pregnancy only if the potential benefit justifies the potential risk to the fetus.   In a study in lactating healthy women, olanzapine was excreted in breast milk.  It is recommended that women taking olanzapine should not breast feed. Dupixent Pregnancy And Lactation Text: This medication likely crosses the placenta but the risk for the fetus is uncertain. This medication is excreted in breast milk. Low Dose Naltrexone Pregnancy And Lactation Text: Naltrexone is pregnancy category C.  There have been no adequate and well-controlled studies in pregnant women.  It should be used in pregnancy only if the potential benefit justifies the potential risk to the fetus.   Limited data indicates that naltrexone is minimally excreted into breastmilk. Klisyri Counseling:  I discussed with the patient the risks of Klisyri including but not limited to erythema, scaling, itching, weeping, crusting, and pain. Erivedge Counseling- I discussed with the patient the risks of Erivedge including but not limited to nausea, vomiting, diarrhea, constipation, weight loss, changes in the sense of taste, decreased appetite, muscle spasms, and hair loss.  The patient verbalized understanding of the proper use and possible adverse effects of Erivedge.  All of the patient's questions and concerns were addressed. Litfulo Pregnancy And Lactation Text: Based on animal studies, Lifulo may cause embryo-fetal harm when administered to pregnant women.  The medication should not be used in pregnancy.  Breastfeeding is not recommended during treatment. Sotyktu Pregnancy And Lactation Text: There is insufficient data to evaluate whether or not Sotyktu is safe to use during pregnancy.? ?It is not known if Sotyktu passes into breast milk and whether or not it is safe to use when breastfeeding.?? Zepbound Counseling: I reviewed the possible side effects including: thyroid tumors, kidney disease, gallbladder disease, abdominal pain, constipation, diarrhea, nausea, vomiting and pancreatitis. Do not take this medication if you have a history or family history of multiple endocrine neoplasia syndrome type 2. Side effects reviewed, pt to contact office should one occur. Finasteride Male Counseling: Finasteride Counseling:  I discussed with the patient the risks of use of finasteride including but not limited to decreased libido, decreased ejaculate volume, gynecomastia, and depression. Women should not handle medication.  All of the patient's questions and concerns were addressed. Benzoyl Peroxide Pregnancy And Lactation Text: This medication is Pregnancy Category C. It is unknown if benzoyl peroxide is excreted in breast milk. Azithromycin Counseling:  I discussed with the patient the risks of azithromycin including but not limited to GI upset, allergic reaction, drug rash, diarrhea, and yeast infections. Rifampin Counseling: I discussed with the patient the risks of rifampin including but not limited to liver damage, kidney damage, red-orange body fluids, nausea/vomiting and severe allergy. Azathioprine Counseling:  I discussed with the patient the risks of azathioprine including but not limited to myelosuppression, immunosuppression, hepatotoxicity, lymphoma, and infections.  The patient understands that monitoring is required including baseline LFTs, Creatinine, possible TPMP genotyping and weekly CBCs for the first month and then every 2 weeks thereafter.  The patient verbalized understanding of the proper use and possible adverse effects of azathioprine.  All of the patient's questions and concerns were addressed. Protopic Counseling: Patient may experience a mild burning sensation during topical application. Protopic is not approved in children less than 2 years of age. There have been case reports of hematologic and skin malignancies in patients using topical calcineurin inhibitors although causality is questionable. Oral Minoxidil Counseling- I discussed with the patient the risks of oral minoxidil including but not limited to shortness of breath, swelling of the feet or ankles, dizziness, lightheadedness, unwanted hair growth and allergic reaction.  The patient verbalized understanding of the proper use and possible adverse effects of oral minoxidil.  All of the patient's questions and concerns were addressed. Topical Ketoconazole Counseling: Patient counseled that this medication may cause skin irritation or allergic reactions.  In the event of skin irritation, the patient was advised to reduce the amount of the drug applied or use it less frequently.   The patient verbalized understanding of the proper use and possible adverse effects of ketoconazole.  All of the patient's questions and concerns were addressed. Olumiant Counseling: I discussed with the patient the risks of Olumiant therapy including but not limited to upper respiratory tract infections, shingles, cold sores, and nausea. Live vaccines should be avoided.  This medication has been linked to serious infections; higher rate of mortality; malignancy and lymphoproliferative disorders; major adverse cardiovascular events; thrombosis; gastrointestinal perforations; neutropenia; lymphopenia; anemia; liver enzyme elevations; and lipid elevations. Niacinamide Counseling: I recommended taking niacin or niacinamide, also know as vitamin B3, twice daily. Recent evidence suggests that taking vitamin B3 (500 mg twice daily) can reduce the risk of actinic keratoses and non-melanoma skin cancers. Side effects of vitamin B3 include flushing and headache. Azithromycin Pregnancy And Lactation Text: This medication is considered safe during pregnancy and is also secreted in breast milk. Rifampin Pregnancy And Lactation Text: This medication is Pregnancy Category C and it isn't know if it is safe during pregnancy. It is also excreted in breast milk and should not be used if you are breast feeding. Azathioprine Pregnancy And Lactation Text: This medication is Pregnancy Category D and isn't considered safe during pregnancy. It is unknown if this medication is excreted in breast milk. Carac Counseling:  I discussed with the patient the risks of Carac including but not limited to erythema, scaling, itching, weeping, crusting, and pain. Simlandi Counseling:  I discussed with the patient the risks of adalimumab including but not limited to myelosuppression, immunosuppression, autoimmune hepatitis, demyelinating diseases, lymphoma, and serious infections.  The patient understands that monitoring is required including a PPD at baseline and must alert us or the primary physician if symptoms of infection or other concerning signs are noted. Ebglyss Counseling: I discussed with the patient the risks of lebrikizumab including but not limited to eye inflammation and irritation, cold sores, injection site reactions, allergic reactions and increased risk of parasitic infection. The patient understands that monitoring is required and they must alert us or the primary physician if symptoms of infection or other concerning signs are noted. Klisyri Pregnancy And Lactation Text: It is unknown if this medication can harm a developing fetus or if it is excreted in breast milk. Oral Minoxidil Pregnancy And Lactation Text: This medication should only be used when clearly needed if you are pregnant, attempting to become pregnant or breast feeding. Ebglyss Pregnancy And Lactation Text: This medication likely crosses the placenta but the risk for the fetus is uncertain. It is unknown if this medication is excreted in breast milk. Finasteride Female Counseling: Finasteride Counseling:  I discussed with the patient the risks of use of finasteride including but not limited to decreased libido and sexual dysfunction. Explained the teratogenic nature of the medication and stressed the importance of not getting pregnant during treatment. All of the patient's questions and concerns were addressed. Protopic Pregnancy And Lactation Text: This medication is Pregnancy Category C. It is unknown if this medication is excreted in breast milk when applied topically. Olumiant Pregnancy And Lactation Text: Based on animal studies, Olumiant may cause embryo-fetal harm when administered to pregnant women.  The medication should not be used in pregnancy.  Breastfeeding is not recommended during treatment. Sarecycline Counseling: Patient advised regarding possible photosensitivity and discoloration of the teeth, skin, lips, tongue and gums.  Patient instructed to avoid sunlight, if possible.  When exposed to sunlight, patients should wear protective clothing, sunglasses, and sunscreen.  The patient was instructed to call the office immediately if the following severe adverse effects occur:  hearing changes, easy bruising/bleeding, severe headache, or vision changes.  The patient verbalized understanding of the proper use and possible adverse effects of sarecycline.  All of the patient's questions and concerns were addressed. Topical Metronidazole Counseling: Metronidazole is a topical antibiotic medication. You may experience burning, stinging, redness, or allergic reactions.  Please call our office if you develop any problems from using this medication. Otezla Counseling: The side effects of Otezla were discussed with the patient, including but not limited to worsening or new depression, weight loss, diarrhea, nausea, upper respiratory tract infection, and headache. Patient instructed to call the office should any adverse effect occur.  The patient verbalized understanding of the proper use and possible adverse effects of Otezla.  All the patient's questions and concerns were addressed. Libtayo Counseling- I discussed with the patient the risks of Libtayo including but not limited to nausea, vomiting, diarrhea, and bone or muscle pain.  The patient verbalized understanding of the proper use and possible adverse effects of Libtayo.  All of the patient's questions and concerns were addressed. Bactrim Counseling:  I discussed with the patient the risks of sulfa antibiotics including but not limited to GI upset, allergic reaction, drug rash, diarrhea, dizziness, photosensitivity, and yeast infections.  Rarely, more serious reactions can occur including but not limited to aplastic anemia, agranulocytosis, methemoglobinemia, blood dyscrasias, liver or kidney failure, lung infiltrates or desquamative/blistering drug rashes. Niacinamide Pregnancy And Lactation Text: These medications are considered safe during pregnancy. Latisse Counseling: Lattise Counseling: I reviewed the possible side-effects of Latisse including itching, eye irritation, discoloration and exacerbating glaucoma. I also discussed application methods. Adbry Counseling: I discussed with the patient the risks of tralokinumab including but not limited to eye infection and irritation, cold sores, injection site reactions, worsening of asthma, allergic reactions and increased risk of parasitic infection.  Live vaccines should be avoided while taking tralokinumab. The patient understands that monitoring is required and they must alert us or the primary physician if symptoms of infection or other concerning signs are noted. Cellcept Counseling:  I discussed with the patient the risks of mycophenolate mofetil including but not limited to infection/immunosuppression, GI upset, hypokalemia, hypercholesterolemia, bone marrow suppression, lymphoproliferative disorders, malignancy, GI ulceration/bleed/perforation, colitis, interstitial lung disease, kidney failure, progressive multifocal leukoencephalopathy, and birth defects.  The patient understands that monitoring is required including a baseline creatinine and regular CBC testing. In addition, patient must alert us immediately if symptoms of infection or other concerning signs are noted. Qbrexza Counseling:  I discussed with the patient the risks of Qbrexza including but not limited to headache, mydriasis, blurred vision, dry eyes, nasal dryness, dry mouth, dry throat, dry skin, urinary hesitation, and constipation.  Local skin reactions including erythema, burning, stinging, and itching can also occur. Adbry Pregnancy And Lactation Text: It is unknown if this medication will adversely affect pregnancy or breast feeding. Latisse Pregnancy And Lactation Text: It is not recommended to use Latisse if you are pregnant or trying to become pregnant. Simponi Counseling:  I discussed with the patient the risks of golimumab including but not limited to myelosuppression, immunosuppression, autoimmune hepatitis, demyelinating diseases, lymphoma, and serious infections.  The patient understands that monitoring is required including a PPD at baseline and must alert us or the primary physician if symptoms of infection or other concerning signs are noted. Nsaids Counseling: NSAID Counseling: I discussed with the patient that NSAIDs should be taken with food. Prolonged use of NSAIDs can result in the development of stomach ulcers.  Patient advised to stop taking NSAIDs if abdominal pain occurs.  The patient verbalized understanding of the proper use and possible adverse effects of NSAIDs.  All of the patient's questions and concerns were addressed. Enbrel Counseling:  I discussed with the patient the risks of etanercept including but not limited to myelosuppression, immunosuppression, autoimmune hepatitis, demyelinating diseases, lymphoma, and infections.  The patient understands that monitoring is required including a PPD at baseline and must alert us or the primary physician if symptoms of infection or other concerning signs are noted. Topical Metronidazole Pregnancy And Lactation Text: This medication is Pregnancy Category B and considered safe during pregnancy.  It is also considered safe to use while breastfeeding. Otezla Pregnancy And Lactation Text: This medication is Pregnancy Category C and it isn't known if it is safe during pregnancy. It is unknown if it is excreted in breast milk. Libtayo Pregnancy And Lactation Text: This medication is contraindicated in pregnancy and when breast feeding. Calcipotriene Counseling:  I discussed with the patient the risks of calcipotriene including but not limited to erythema, scaling, itching, and irritation. Arava Counseling:  Patient counseled regarding adverse effects of Arava including but not limited to nausea, vomiting, abnormalities in liver function tests. Patients may develop mouth sores, rash, diarrhea, and abnormalities in blood counts. The patient understands that monitoring is required including LFTs and blood counts.  There is a rare possibility of scarring of the liver and lung problems that can occur when taking methotrexate. Persistent nausea, loss of appetite, pale stools, dark urine, cough, and shortness of breath should be reported immediately. Patient advised to discontinue Arava treatment and consult with a physician prior to attempting conception. The patient will have to undergo a treatment to eliminate Arava from the body prior to conception. Rinvoq Counseling: I discussed with the patient the risks of Rinvoq therapy including but not limited to upper respiratory tract infections, shingles, cold sores, bronchitis, nausea, cough, fever, acne, and headache. Live vaccines should be avoided.  This medication has been linked to serious infections; higher rate of mortality; malignancy and lymphoproliferative disorders; major adverse cardiovascular events; thrombosis; thrombocytopenia, anemia, and neutropenia; lipid elevations; liver enzyme elevations; and gastrointestinal perforations. Bactrim Pregnancy And Lactation Text: This medication is Pregnancy Category D and is known to cause fetal risk.  It is also excreted in breast milk.

## 2025-07-03 ENCOUNTER — OFFICE VISIT (OUTPATIENT)
Dept: PULMONOLOGY | Age: 65
End: 2025-07-03
Payer: MEDICARE

## 2025-07-03 VITALS
RESPIRATION RATE: 14 BRPM | DIASTOLIC BLOOD PRESSURE: 63 MMHG | OXYGEN SATURATION: 100 % | WEIGHT: 232 LBS | BODY MASS INDEX: 31.42 KG/M2 | HEART RATE: 58 BPM | HEIGHT: 72 IN | SYSTOLIC BLOOD PRESSURE: 112 MMHG

## 2025-07-03 DIAGNOSIS — G47.33 OSA ON CPAP: Primary | ICD-10-CM

## 2025-07-03 PROCEDURE — G8427 DOCREV CUR MEDS BY ELIG CLIN: HCPCS | Performed by: INTERNAL MEDICINE

## 2025-07-03 PROCEDURE — 3017F COLORECTAL CA SCREEN DOC REV: CPT | Performed by: INTERNAL MEDICINE

## 2025-07-03 PROCEDURE — 3074F SYST BP LT 130 MM HG: CPT | Performed by: INTERNAL MEDICINE

## 2025-07-03 PROCEDURE — G8417 CALC BMI ABV UP PARAM F/U: HCPCS | Performed by: INTERNAL MEDICINE

## 2025-07-03 PROCEDURE — 1159F MED LIST DOCD IN RCRD: CPT | Performed by: INTERNAL MEDICINE

## 2025-07-03 PROCEDURE — 1123F ACP DISCUSS/DSCN MKR DOCD: CPT | Performed by: INTERNAL MEDICINE

## 2025-07-03 PROCEDURE — 3078F DIAST BP <80 MM HG: CPT | Performed by: INTERNAL MEDICINE

## 2025-07-03 PROCEDURE — 99214 OFFICE O/P EST MOD 30 MIN: CPT | Performed by: INTERNAL MEDICINE

## 2025-07-03 PROCEDURE — 1036F TOBACCO NON-USER: CPT | Performed by: INTERNAL MEDICINE

## 2025-07-03 ASSESSMENT — SLEEP AND FATIGUE QUESTIONNAIRES
HOW LIKELY ARE YOU TO NOD OFF OR FALL ASLEEP WHILE SITTING AND READING: HIGH CHANCE OF DOZING
HOW LIKELY ARE YOU TO NOD OFF OR FALL ASLEEP IN A CAR, WHILE STOPPED FOR A FEW MINUTES IN TRAFFIC: WOULD NEVER DOZE
HOW LIKELY ARE YOU TO NOD OFF OR FALL ASLEEP WHILE SITTING AND TALKING TO SOMEONE: WOULD NEVER DOZE
HOW LIKELY ARE YOU TO NOD OFF OR FALL ASLEEP WHEN YOU ARE A PASSENGER IN A CAR FOR AN HOUR WITHOUT A BREAK: SLIGHT CHANCE OF DOZING
ESS TOTAL SCORE: 10
HOW LIKELY ARE YOU TO NOD OFF OR FALL ASLEEP WHILE SITTING INACTIVE IN A PUBLIC PLACE: WOULD NEVER DOZE
HOW LIKELY ARE YOU TO NOD OFF OR FALL ASLEEP WHILE LYING DOWN TO REST IN THE AFTERNOON WHEN CIRCUMSTANCES PERMIT: HIGH CHANCE OF DOZING
HOW LIKELY ARE YOU TO NOD OFF OR FALL ASLEEP WHILE WATCHING TV: MODERATE CHANCE OF DOZING
HOW LIKELY ARE YOU TO NOD OFF OR FALL ASLEEP WHILE SITTING QUIETLY AFTER LUNCH WITHOUT ALCOHOL: SLIGHT CHANCE OF DOZING

## 2025-07-03 ASSESSMENT — ENCOUNTER SYMPTOMS
ALLERGIC/IMMUNOLOGIC NEGATIVE: 1
RESPIRATORY NEGATIVE: 1
GASTROINTESTINAL NEGATIVE: 1
EYES NEGATIVE: 1

## 2025-07-03 NOTE — PROGRESS NOTES
well appearing, and in no distress  Mental status - alert, oriented to person, place, and time  Eyes - pupils equal and reactive, extraocular eye movements intact  Ears -not examined  Nose - normal and patent, no erythema, discharge or polyps  Mouth - mucous membranes moist, pharynx normal without lesions  Neck - supple, no significant adenopathy  Chest - clear to auscultation, no wheezes, rales or rhonchi, symmetric air entry  Heart -normal rate, regular rhythm, normal S1, S2, no murmurs, rubs, clicks or gallops  Abdomen - soft, nontender, nondistended, no masses or organomegaly  Neuro- alert, oriented, normal speech, no focal findings or movement disorder noted}  Extremities - peripheral pulses normal, no pedal edema, no clubbing or cyanosis  Skin - normal coloration and turgor, no rashes, no suspicious skin lesions noted     Wt Readings from Last 3 Encounters:   07/03/25 105.2 kg (232 lb)   03/21/25 104.7 kg (230 lb 12.8 oz)   02/03/25 102.7 kg (226 lb 6.4 oz)       Results for orders placed or performed during the hospital encounter of 03/21/25   CBC   Result Value Ref Range    WBC 4.0 3.5 - 11.3 k/uL    RBC 4.57 4.21 - 5.77 m/uL    Hemoglobin 13.0 13.0 - 17.0 g/dL    Hematocrit 40.0 (L) 40.7 - 50.3 %    MCV 87.5 82.6 - 102.9 fL    MCH 28.4 25.2 - 33.5 pg    MCHC 32.5 28.4 - 34.8 g/dL    RDW 13.2 11.8 - 14.4 %    Platelets 213 138 - 453 k/uL    MPV 9.9 8.1 - 13.5 fL    NRBC Automated 0.0 0.0 per 100 WBC   Comprehensive Metabolic Panel   Result Value Ref Range    Sodium 142 136 - 145 mmol/L    Potassium 3.6 (L) 3.7 - 5.3 mmol/L    Chloride 106 98 - 107 mmol/L    CO2 28 20 - 31 mmol/L    Anion Gap 8 (L) 9 - 16 mmol/L    Glucose 95 74 - 99 mg/dL    BUN 11 8 - 23 mg/dL    Creatinine 1.0 0.7 - 1.2 mg/dL    Est, Glom Filt Rate 83 >60 mL/min/1.73m2    Calcium 9.5 8.6 - 10.4 mg/dL    Total Protein 7.4 6.6 - 8.7 g/dL    Albumin 4.3 3.5 - 5.2 g/dL    Albumin/Globulin Ratio 1.4 1.0 - 2.5    Total Bilirubin 1.6 (H) 0.0 -

## 2025-07-07 NOTE — TELEPHONE ENCOUNTER
Bobo Hutton is calling to request a refill on the following medication(s):    Medication Request:  Requested Prescriptions     Pending Prescriptions Disp Refills    valsartan-hydroCHLOROthiazide (DIOVAN-HCT) 160-12.5 MG per tablet [Pharmacy Med Name: Valsartan-hydroCHLOROthiazide 160-12.5 MG Oral Tablet] 90 tablet 3     Sig: TAKE 1 TABLET BY MOUTH DAILY    pravastatin (PRAVACHOL) 40 MG tablet [Pharmacy Med Name: Pravastatin Sodium 40 MG Oral Tablet] 90 tablet 3     Sig: TAKE 1 TABLET BY MOUTH DAILY       Last Visit Date (If Applicable):  3/21/2025    Next Visit Date:    9/22/2025

## 2025-07-08 RX ORDER — PRAVASTATIN SODIUM 40 MG
40 TABLET ORAL DAILY
Qty: 90 TABLET | Refills: 2 | Status: SHIPPED | OUTPATIENT
Start: 2025-07-08

## 2025-07-08 RX ORDER — VALSARTAN AND HYDROCHLOROTHIAZIDE 160; 12.5 MG/1; MG/1
1 TABLET, FILM COATED ORAL DAILY
Qty: 90 TABLET | Refills: 2 | Status: SHIPPED | OUTPATIENT
Start: 2025-07-08

## 2025-07-24 ENCOUNTER — HOSPITAL ENCOUNTER (EMERGENCY)
Facility: CLINIC | Age: 65
Discharge: HOME | End: 2025-07-25
Attending: STUDENT IN AN ORGANIZED HEALTH CARE EDUCATION/TRAINING PROGRAM
Payer: MEDICARE

## 2025-07-24 ENCOUNTER — APPOINTMENT (OUTPATIENT)
Dept: CT IMAGING | Facility: CLINIC | Age: 65
End: 2025-07-24
Attending: STUDENT IN AN ORGANIZED HEALTH CARE EDUCATION/TRAINING PROGRAM
Payer: MEDICARE

## 2025-07-24 DIAGNOSIS — K76.89 LIVER CYST: ICD-10-CM

## 2025-07-24 DIAGNOSIS — R14.0 ABDOMINAL BLOATING: Primary | ICD-10-CM

## 2025-07-24 LAB
ALBUMIN SERPL-MCNC: 4.7 G/DL (ref 3.5–5.2)
ALBUMIN/GLOB SERPL: 1.4 {RATIO}
ALP SERPL-CCNC: 103 U/L (ref 40–129)
ALT SERPL-CCNC: 13 U/L (ref 10–50)
ANION GAP SERPL CALCULATED.3IONS-SCNC: 11 MMOL/L (ref 9–16)
AST SERPL-CCNC: 24 U/L (ref 10–50)
BACTERIA URNS QL MICRO: ABNORMAL
BASOPHILS # BLD: 0 K/UL (ref 0–0.2)
BASOPHILS NFR BLD: 0 % (ref 0–2)
BILIRUB SERPL-MCNC: 1.6 MG/DL (ref 0–1.2)
BILIRUB UR QL STRIP: NEGATIVE
BUN SERPL-MCNC: 9 MG/DL (ref 8–23)
CALCIUM SERPL-MCNC: 10 MG/DL (ref 8.6–10.4)
CHLORIDE SERPL-SCNC: 105 MMOL/L (ref 98–107)
CLARITY UR: CLEAR
CO2 SERPL-SCNC: 25 MMOL/L (ref 20–31)
COLOR UR: YELLOW
CREAT SERPL-MCNC: 1.1 MG/DL (ref 0.7–1.2)
EOSINOPHIL # BLD: 0.1 K/UL (ref 0–0.4)
EOSINOPHILS RELATIVE PERCENT: 1 % (ref 1–4)
EPI CELLS #/AREA URNS HPF: ABNORMAL /HPF (ref 0–5)
ERYTHROCYTE [DISTWIDTH] IN BLOOD BY AUTOMATED COUNT: 13.9 % (ref 12.5–15.4)
GFR, ESTIMATED: 74 ML/MIN/1.73M2
GLUCOSE SERPL-MCNC: 112 MG/DL (ref 74–99)
GLUCOSE UR STRIP-MCNC: NEGATIVE MG/DL
HCT VFR BLD AUTO: 41.8 % (ref 41–53)
HGB BLD-MCNC: 14.4 G/DL (ref 13.5–17.5)
HGB UR QL STRIP.AUTO: ABNORMAL
KETONES UR STRIP-MCNC: NEGATIVE MG/DL
LACTATE BLDV-SCNC: 0.9 MMOL/L (ref 0.5–2.2)
LEUKOCYTE ESTERASE UR QL STRIP: NEGATIVE
LIPASE SERPL-CCNC: 35 U/L (ref 13–60)
LYMPHOCYTES NFR BLD: 1.6 K/UL (ref 1–4.8)
LYMPHOCYTES RELATIVE PERCENT: 36 % (ref 24–44)
MCH RBC QN AUTO: 29.5 PG (ref 26–34)
MCHC RBC AUTO-ENTMCNC: 34.6 G/DL (ref 31–37)
MCV RBC AUTO: 85.4 FL (ref 80–100)
MONOCYTES NFR BLD: 0.3 K/UL (ref 0.1–1.2)
MONOCYTES NFR BLD: 8 % (ref 2–11)
NEUTROPHILS NFR BLD: 55 % (ref 36–66)
NEUTS SEG NFR BLD: 2.4 K/UL (ref 1.8–7.7)
NITRITE UR QL STRIP: NEGATIVE
PH UR STRIP: 7 [PH] (ref 5–8)
PLATELET # BLD AUTO: 191 K/UL (ref 140–450)
PMV BLD AUTO: 7.4 FL (ref 6–12)
POTASSIUM SERPL-SCNC: 3.5 MMOL/L (ref 3.7–5.3)
PROT SERPL-MCNC: 8.1 G/DL (ref 6.6–8.7)
PROT UR STRIP-MCNC: NEGATIVE MG/DL
RBC # BLD AUTO: 4.89 M/UL (ref 4.5–5.9)
RBC #/AREA URNS HPF: ABNORMAL /HPF (ref 0–2)
SODIUM SERPL-SCNC: 141 MMOL/L (ref 136–145)
SP GR UR STRIP: 1.01 (ref 1–1.03)
UROBILINOGEN UR STRIP-ACNC: NORMAL EU/DL (ref 0–1)
WBC #/AREA URNS HPF: ABNORMAL /HPF (ref 0–5)
WBC OTHER # BLD: 4.4 K/UL (ref 3.5–11)

## 2025-07-24 PROCEDURE — 81001 URINALYSIS AUTO W/SCOPE: CPT

## 2025-07-24 PROCEDURE — 83690 ASSAY OF LIPASE: CPT

## 2025-07-24 PROCEDURE — 85025 COMPLETE CBC W/AUTO DIFF WBC: CPT

## 2025-07-24 PROCEDURE — 6360000004 HC RX CONTRAST MEDICATION: Performed by: STUDENT IN AN ORGANIZED HEALTH CARE EDUCATION/TRAINING PROGRAM

## 2025-07-24 PROCEDURE — 99285 EMERGENCY DEPT VISIT HI MDM: CPT

## 2025-07-24 PROCEDURE — 74177 CT ABD & PELVIS W/CONTRAST: CPT

## 2025-07-24 PROCEDURE — 80053 COMPREHEN METABOLIC PANEL: CPT

## 2025-07-24 PROCEDURE — 83605 ASSAY OF LACTIC ACID: CPT

## 2025-07-24 PROCEDURE — 2580000003 HC RX 258: Performed by: STUDENT IN AN ORGANIZED HEALTH CARE EDUCATION/TRAINING PROGRAM

## 2025-07-24 RX ORDER — SODIUM CHLORIDE 0.9 % (FLUSH) 0.9 %
3 SYRINGE (ML) INJECTION EVERY 8 HOURS
Status: DISCONTINUED | OUTPATIENT
Start: 2025-07-24 | End: 2025-07-25 | Stop reason: HOSPADM

## 2025-07-24 RX ORDER — IOPAMIDOL 755 MG/ML
75 INJECTION, SOLUTION INTRAVASCULAR
Status: COMPLETED | OUTPATIENT
Start: 2025-07-24 | End: 2025-07-24

## 2025-07-24 RX ORDER — 0.9 % SODIUM CHLORIDE 0.9 %
70 INTRAVENOUS SOLUTION INTRAVENOUS ONCE
Status: COMPLETED | OUTPATIENT
Start: 2025-07-24 | End: 2025-07-24

## 2025-07-24 RX ADMIN — SODIUM CHLORIDE 70 ML: 9 INJECTION, SOLUTION INTRAVENOUS at 23:14

## 2025-07-24 RX ADMIN — IOPAMIDOL 75 ML: 755 INJECTION, SOLUTION INTRAVENOUS at 23:15

## 2025-07-25 VITALS
SYSTOLIC BLOOD PRESSURE: 126 MMHG | RESPIRATION RATE: 16 BRPM | DIASTOLIC BLOOD PRESSURE: 59 MMHG | OXYGEN SATURATION: 100 % | TEMPERATURE: 98.3 F | HEART RATE: 72 BPM

## 2025-07-25 RX ORDER — DOCUSATE SODIUM 100 MG/1
100 CAPSULE, LIQUID FILLED ORAL 2 TIMES DAILY PRN
Qty: 28 CAPSULE | Refills: 0 | Status: SHIPPED | OUTPATIENT
Start: 2025-07-25 | End: 2025-08-08

## 2025-07-25 ASSESSMENT — PAIN - FUNCTIONAL ASSESSMENT: PAIN_FUNCTIONAL_ASSESSMENT: NONE - DENIES PAIN

## 2025-07-25 NOTE — DISCHARGE INSTR - COC
Continuity of Care Form    Patient Name: Bobo Hutton   :  1960  MRN:  [id number]    Admit date:  2025  Discharge date:  ***    Code Status Order: No Order   Advance Directives:     Admitting Physician:  No admitting provider for patient encounter.  PCP: Geraldine lOea MD    Discharging Nurse: ***  Discharging Hospital Unit/Room#: ER10/ER10  Discharging Unit Phone Number: ***    Emergency Contact:   Extended Emergency Contact Information  Primary Emergency Contact: Carmen Hutton  Address: [address] [place] [address]  Home Phone: [phone]  Work Phone: [phone]  Mobile Phone: [phone]  Relation: Spouse    Past Surgical History:  Past Surgical History:   Procedure Laterality Date    COLONOSCOPY      ENDOSCOPY, COLON, DIAGNOSTIC      ESOPHAGEAL MOTILITY STUDY N/A 2021    ESOPHAGEAL MOTILITY STUDY performed by Sheryl Lewis MD at [facility]    EYE SURGERY Left 2022    PENIS SURGERY N/A 2021    CYSTO  FULGURATION  EXCISION PENILE WARTS performed by Freddy Jim MD at [facility] OR    DC DSTRJ LESION PENIS SIMPLE SURG EXCISION N/A 2018    SUPRA PUBIC LESIONS BIOPSY EXCISION performed by Freddy Jim MD at [facility] OR    SHOULDER BIOPSY Right 2024    [facility]    SKIN BIOPSY Bilateral 2018    SUPRA PUBIC LESIONS BIOPSY EXCISION (N/A )    THROAT SURGERY  2008    TONSILLECTOMY      TUMOR REMOVAL Right 2016    [facility]  right thigh   (benign)    UPPER GASTROINTESTINAL ENDOSCOPY N/A 2020    EGD BIOPSY performed by Sheryl Lewis MD at [facility]       Immunization History:   Immunization History   Administered Date(s) Administered    COVID-19, MODERNA BLUE border, Primary or Immunocompromised, (age 12y+), IM, 100 mcg/0.5mL 2021, 2021    COVID-19, PFIZER Bivalent, DO NOT Dilute, (age 12y+), IM, 30 mcg/0.3 mL 10/21/2022    COVID-19, PFIZER GRAY top, DO NOT Dilute, (age 12 y+), IM, 30 mcg/0.3 mL 07/15/2022,

## 2025-07-25 NOTE — ED PROVIDER NOTES
[facility]  Emergency Department Encounter  [facility]       Pt Name:Bobo Hutton  MRN: [id number]  Birthdate 1960  Date of evaluation: 7/25/25  PCP:  Geraldine Olea MD      CHIEF COMPLAINT       Chief Complaint   Patient presents with   • Abdominal Pain       HISTORY OF PRESENT ILLNESS     Bobo Hutton is a 64 y.o. male who presents via ***    PAST MEDICAL / SURGICAL / SOCIAL / FAMILY HISTORY      has a past medical history of Arthritis, BPH (benign prostatic hyperplasia), Class 2 severe obesity due to excess calories with serious comorbidity in adult (HCC), Dysphagia, Erectile dysfunction, Full dentures, GERD (gastroesophageal reflux disease), Hiatal hernia, Hyperlipidemia, Hypertension, YUMIKO on CPAP, PONV (postoperative nausea and vomiting), and Unspecified sleep apnea.       has a past surgical history that includes Throat surgery (2008); tumor removal (Right, 09/23/2016); Endoscopy, colon, diagnostic; Colonoscopy; skin biopsy (Bilateral, 07/31/2018); pr dstrj lesion penis simple surg excision (N/A, 07/31/2018); Upper gastrointestinal endoscopy (N/A, 12/21/2020); esophageal motility study (N/A, 03/17/2021); Tonsillectomy; Penis surgery (N/A, 07/16/2021); Eye surgery (Left, 11/2022); and Shoulder biopsy (Right, 11/14/2024).      Social History     Socioeconomic History   • Marital status:      Spouse name: Not on file   • Number of children: Not on file   • Years of education: Not on file   • Highest education level: Not on file   Occupational History   • Not on file   Tobacco Use   • Smoking status: Never   • Smokeless tobacco: Never   Vaping Use   • Vaping status: Never Used   Substance and Sexual Activity   • Alcohol use: Not Currently   • Drug use: No   • Sexual activity: Yes     Partners: Female   Other Topics Concern   • Not on file   Social History Narrative   • Not on file     Social Drivers of Health     Financial Resource Strain: Low Risk       DISPOSITION / PLAN     DISPOSITION Decision To Discharge 07/25/2025 12:37:31 AM   DISPOSITION CONDITION Stable           PATIENT REFERRED TO:  Geraldine Olea MD  [address] [address]  [facility] [address]  [facility] [address]  [phone]    Call   For Post Emergency Department Follow Up    Adis Meléndez DO  [address] [facility] [address]  [facility] [address]  [phone]    Call   For Post Emergency Department Follow Up to establish care with gastroenterologist for evaluation of liver cysts    [facility] Emergency Department  [address] [facility] [id number] [phone]    As needed, If symptoms worsen      DISCHARGE MEDICATIONS:  New Prescriptions    No medications on file       Sue Burks DO  Emergency Medicine Physician    (Please note that portions of this note were completed with a voice recognition program.  Efforts were made to edit the dictations but occasionally words are mis-transcribed.)                      Sue Burks DO  07/28/25 [id number]

## 2025-07-25 NOTE — DISCHARGE INSTRUCTIONS
SUMMARY OF YOUR VISIT    Today you were seen for evaluation Emergency Department.  We discussed your labs and imaging.  I provided the gastroenterologist on-call contact information above, I do recommend that you establish care with a gastroenterologist for further evaluation of your liver cysts.    We discussed your bowel habits.  I am prescribing you a laxative that I recommend you take as needed to help you have a bowel movement.    I do recommend you follow-up with your primary care provider within 3 to 7 days for reevaluation to ensure symptoms are improving as well as to discuss liver findings.    If you have any new, changing, worsening, no improvement or develop additional symptoms or concerns recommend return to the emergency department for reevaluation.    Please continue to take your home medication as previously prescribed, I have made no changes to your home medications.        You can return to our or another Emergency Department as needed or for worsening symptoms of chest pain, shortness of breath, high fevers not relieved by acetaminophen (Tylenol) and/or ibuprofen (Motrin / Advil), chills, feeling of your heart fluttering or racing, persistent nausea and/or vomiting, vomiting up blood, blood in your stool, loss of consciousness, numbness, weakness or tingling in the arms or legs or change in color of the extremities, changes in mental status, persistent headache, blurry vision, loss of bladder / bowel control, if you are unable to follow up with your physician, or other any other care or concern.    Thank You!    On behalf of the Emergency Department staff and team, I would like to thank you for allowing us the opportunity to participate in your health care and evaluation today.

## 2025-08-24 DIAGNOSIS — I10 ESSENTIAL HYPERTENSION: ICD-10-CM

## 2025-08-25 ENCOUNTER — HOSPITAL ENCOUNTER (OUTPATIENT)
Dept: LAB | Age: 65
Discharge: HOME | End: 2025-08-25
Payer: MEDICARE

## 2025-08-25 LAB
ALBUMIN SERPL-MCNC: 4.3 G/DL (ref 3.5–5.2)
ALBUMIN/GLOB SERPL: 1.5 {RATIO} (ref 1–2.5)
ALP SERPL-CCNC: 87 U/L (ref 40–129)
ALT SERPL-CCNC: 11 U/L (ref 10–50)
AST SERPL-CCNC: 18 U/L (ref 10–50)
BILIRUB DIRECT SERPL-MCNC: 0.5 MG/DL (ref 0–0.2)
BILIRUB INDIRECT SERPL-MCNC: 1 MG/DL (ref 0–1)
BILIRUB SERPL-MCNC: 1.5 MG/DL (ref 0–1.2)
GLOBULIN SER CALC-MCNC: 2.8 G/DL
PROT SERPL-MCNC: 7.1 G/DL (ref 6.6–8.7)

## 2025-08-25 PROCEDURE — 36415 COLL VENOUS BLD VENIPUNCTURE: CPT

## 2025-08-25 PROCEDURE — 80076 HEPATIC FUNCTION PANEL: CPT

## 2025-08-25 RX ORDER — AMLODIPINE BESYLATE 10 MG/1
10 TABLET ORAL EVERY MORNING
Qty: 90 TABLET | Refills: 1 | Status: SHIPPED | OUTPATIENT
Start: 2025-08-25

## (undated) DEVICE — PAD,NON-ADHERENT,3X8,STERILE,LF,1/PK: Brand: MEDLINE

## (undated) DEVICE — GOWN,AURORA,NONREINFORCED,LARGE: Brand: MEDLINE

## (undated) DEVICE — MINOR BSIN PK

## (undated) DEVICE — FRAME EYE REUSE FOR SFTY GLS ASST CLR REUSE NONSHIELD

## (undated) DEVICE — SUTURE VCRL SZ 3-0 L27IN ABSRB UD L26MM SH 1/2 CIR J416H

## (undated) DEVICE — TRAY PREP DRY W/ PREM GLV 2 APPL 6 SPNG 2 UNDPD 1 OVERWRAP

## (undated) DEVICE — SUTURE CHROMIC GUT SZ 4-0 L27IN ABSRB BRN L26MM SH 1/2 CIR G121H

## (undated) DEVICE — Z INACTIVE USE 2635503 SOLUTION IRRIG 3000ML ST H2O USP UROMATIC PLAS CONT

## (undated) DEVICE — YANKAUER,FLEXIBLE HANDLE,REGLR CAPACITY: Brand: MEDLINE INDUSTRIES, INC.

## (undated) DEVICE — Z DISCONTINUED USE 2624852 GLOVE SURG 7 PF TEXT NEOPRNE BRN STRL NEOLON 2G LF

## (undated) DEVICE — Device

## (undated) DEVICE — NEEDLE HYPO 27GA L1.25IN GRY POLYPR HUB S STL REG BVL STR

## (undated) DEVICE — D & C-LF: Brand: MEDLINE INDUSTRIES, INC.

## (undated) DEVICE — MARKER,SKIN,WI/RULER AND LABELS: Brand: MEDLINE

## (undated) DEVICE — BANDAGE GZ W2XL75IN ST RAYON POLY CNFRM STRTCH LTWT

## (undated) DEVICE — GLOVE SURG SZ 65 L12IN FNGR THK87MIL WHT LTX FREE

## (undated) DEVICE — CONMED ACCESSORY ELECTRODE, NEEDLE ELECTRODE: Brand: CONMED

## (undated) DEVICE — GLOVE SURG SZ 7 CRM LTX FREE POLYISOPRENE POLYMER BEAD ANTI

## (undated) DEVICE — SYRINGE, LUER LOCK, 10ML: Brand: MEDLINE

## (undated) DEVICE — TOWEL,OR,DSP,ST,BLUE,DLX,XR,4/PK,20PK/CS: Brand: MEDLINE

## (undated) DEVICE — SHEET, T, LAPAROTOMY, STERILE: Brand: MEDLINE

## (undated) DEVICE — DRESSING TRNSPAR W5XL4.5IN FLM SHT SEMIPERMEABLE WIND

## (undated) DEVICE — BLADE CLIPPER GEN PURP NS

## (undated) DEVICE — TUBING, SUCTION, 1/4" X 12', STRAIGHT: Brand: MEDLINE

## (undated) DEVICE — DRAPE,REIN 53X77,STERILE: Brand: MEDLINE

## (undated) DEVICE — GLOVE ORTHO 8   MSG9480

## (undated) DEVICE — TUBING SMK EVAC W7 8INXL6FT FOR SE01 SMK SHARK

## (undated) DEVICE — GOWN,SIRUS,NON REINFRCD,LARGE,SET IN SL: Brand: MEDLINE

## (undated) DEVICE — CONTAINER,SPECIMEN,OR STERILE,4OZ: Brand: MEDLINE

## (undated) DEVICE — INTENDED FOR TISSUE SEPARATION, AND OTHER PROCEDURES THAT REQUIRE A SHARP SURGICAL BLADE TO PUNCTURE OR CUT.: Brand: BARD-PARKER ® CARBON RIB-BACK BLADES

## (undated) DEVICE — DRESSING,GAUZE,PETROLATUM,CURAD,3"X9",ST: Brand: CURAD

## (undated) DEVICE — FORCEPS BX L240CM JAW DIA22MM ORNG STD CAP W NDL RAD JAW 4

## (undated) DEVICE — GLOVE SURG SZ 75 L12IN FNGR THK87MIL WHT LTX FREE

## (undated) DEVICE — GLOVE SURG SZ 7 L12IN FNGR THK87MIL WHT LTX FREE

## (undated) DEVICE — TOWEL,OR,DSP,ST,BLUE,STD,4/PK,20PK/CS: Brand: MEDLINE